# Patient Record
Sex: FEMALE | Race: WHITE | NOT HISPANIC OR LATINO
[De-identification: names, ages, dates, MRNs, and addresses within clinical notes are randomized per-mention and may not be internally consistent; named-entity substitution may affect disease eponyms.]

---

## 2017-02-15 ENCOUNTER — RX RENEWAL (OUTPATIENT)
Age: 67
End: 2017-02-15

## 2017-02-16 ENCOUNTER — RX RENEWAL (OUTPATIENT)
Age: 67
End: 2017-02-16

## 2017-03-22 ENCOUNTER — RX RENEWAL (OUTPATIENT)
Age: 67
End: 2017-03-22

## 2017-05-10 ENCOUNTER — NON-APPOINTMENT (OUTPATIENT)
Age: 67
End: 2017-05-10

## 2017-05-10 ENCOUNTER — APPOINTMENT (OUTPATIENT)
Dept: CARDIOLOGY | Facility: CLINIC | Age: 67
End: 2017-05-10

## 2017-05-10 VITALS
BODY MASS INDEX: 35.87 KG/M2 | SYSTOLIC BLOOD PRESSURE: 114 MMHG | WEIGHT: 190 LBS | DIASTOLIC BLOOD PRESSURE: 78 MMHG | HEART RATE: 84 BPM | HEIGHT: 61 IN | RESPIRATION RATE: 17 BRPM | OXYGEN SATURATION: 94 %

## 2017-06-14 ENCOUNTER — APPOINTMENT (OUTPATIENT)
Dept: FAMILY MEDICINE | Facility: CLINIC | Age: 67
End: 2017-06-14

## 2017-06-14 VITALS
HEART RATE: 93 BPM | SYSTOLIC BLOOD PRESSURE: 120 MMHG | OXYGEN SATURATION: 95 % | WEIGHT: 191.56 LBS | BODY MASS INDEX: 36.17 KG/M2 | HEIGHT: 61 IN | RESPIRATION RATE: 16 BRPM | DIASTOLIC BLOOD PRESSURE: 80 MMHG

## 2017-07-05 ENCOUNTER — APPOINTMENT (OUTPATIENT)
Dept: FAMILY MEDICINE | Facility: CLINIC | Age: 67
End: 2017-07-05

## 2017-07-05 VITALS
TEMPERATURE: 97.4 F | RESPIRATION RATE: 12 BRPM | DIASTOLIC BLOOD PRESSURE: 77 MMHG | HEIGHT: 61 IN | SYSTOLIC BLOOD PRESSURE: 114 MMHG | BODY MASS INDEX: 36.82 KG/M2 | HEART RATE: 94 BPM | WEIGHT: 195 LBS | OXYGEN SATURATION: 96 %

## 2017-09-13 ENCOUNTER — APPOINTMENT (OUTPATIENT)
Dept: CARDIOLOGY | Facility: CLINIC | Age: 67
End: 2017-09-13
Payer: MEDICARE

## 2017-09-13 ENCOUNTER — NON-APPOINTMENT (OUTPATIENT)
Age: 67
End: 2017-09-13

## 2017-09-13 VITALS
SYSTOLIC BLOOD PRESSURE: 122 MMHG | DIASTOLIC BLOOD PRESSURE: 78 MMHG | WEIGHT: 196 LBS | RESPIRATION RATE: 17 BRPM | HEART RATE: 76 BPM | BODY MASS INDEX: 37 KG/M2 | OXYGEN SATURATION: 93 % | HEIGHT: 61 IN

## 2017-09-13 PROCEDURE — 93000 ELECTROCARDIOGRAM COMPLETE: CPT

## 2017-09-13 PROCEDURE — 93306 TTE W/DOPPLER COMPLETE: CPT

## 2017-09-13 PROCEDURE — 99214 OFFICE O/P EST MOD 30 MIN: CPT

## 2017-09-14 ENCOUNTER — RX RENEWAL (OUTPATIENT)
Age: 67
End: 2017-09-14

## 2017-09-22 ENCOUNTER — MEDICATION RENEWAL (OUTPATIENT)
Age: 67
End: 2017-09-22

## 2017-10-29 ENCOUNTER — RX RENEWAL (OUTPATIENT)
Age: 67
End: 2017-10-29

## 2017-10-30 ENCOUNTER — APPOINTMENT (OUTPATIENT)
Dept: FAMILY MEDICINE | Facility: CLINIC | Age: 67
End: 2017-10-30
Payer: MEDICARE

## 2017-10-30 VITALS
DIASTOLIC BLOOD PRESSURE: 67 MMHG | SYSTOLIC BLOOD PRESSURE: 94 MMHG | WEIGHT: 198.25 LBS | BODY MASS INDEX: 37.43 KG/M2 | RESPIRATION RATE: 16 BRPM | OXYGEN SATURATION: 95 % | HEART RATE: 94 BPM | TEMPERATURE: 97.3 F | HEIGHT: 61 IN

## 2017-10-30 PROCEDURE — 90686 IIV4 VACC NO PRSV 0.5 ML IM: CPT

## 2017-10-30 PROCEDURE — G0008: CPT

## 2017-10-30 RX ORDER — PREDNISONE 20 MG/1
20 TABLET ORAL
Qty: 6 | Refills: 0 | Status: COMPLETED | COMMUNITY
Start: 2017-06-14 | End: 2017-10-30

## 2017-11-27 ENCOUNTER — RX RENEWAL (OUTPATIENT)
Age: 67
End: 2017-11-27

## 2017-12-14 ENCOUNTER — RX RENEWAL (OUTPATIENT)
Age: 67
End: 2017-12-14

## 2017-12-18 ENCOUNTER — RX RENEWAL (OUTPATIENT)
Age: 67
End: 2017-12-18

## 2018-01-03 ENCOUNTER — APPOINTMENT (OUTPATIENT)
Dept: FAMILY MEDICINE | Facility: CLINIC | Age: 68
End: 2018-01-03
Payer: MEDICARE

## 2018-01-03 VITALS
BODY MASS INDEX: 37.77 KG/M2 | WEIGHT: 200.06 LBS | RESPIRATION RATE: 16 BRPM | DIASTOLIC BLOOD PRESSURE: 84 MMHG | HEART RATE: 91 BPM | HEIGHT: 61 IN | OXYGEN SATURATION: 95 % | SYSTOLIC BLOOD PRESSURE: 100 MMHG

## 2018-01-03 DIAGNOSIS — L98.9 DISORDER OF THE SKIN AND SUBCUTANEOUS TISSUE, UNSPECIFIED: ICD-10-CM

## 2018-01-03 PROCEDURE — 99213 OFFICE O/P EST LOW 20 MIN: CPT

## 2018-01-08 ENCOUNTER — APPOINTMENT (OUTPATIENT)
Dept: CARDIOLOGY | Facility: CLINIC | Age: 68
End: 2018-01-08
Payer: MEDICARE

## 2018-01-08 PROCEDURE — A9500: CPT

## 2018-01-08 PROCEDURE — 93015 CV STRESS TEST SUPVJ I&R: CPT

## 2018-01-08 PROCEDURE — 78452 HT MUSCLE IMAGE SPECT MULT: CPT

## 2018-01-10 ENCOUNTER — APPOINTMENT (OUTPATIENT)
Dept: FAMILY MEDICINE | Facility: CLINIC | Age: 68
End: 2018-01-10
Payer: MEDICARE

## 2018-01-10 VITALS
HEART RATE: 84 BPM | HEIGHT: 61 IN | OXYGEN SATURATION: 97 % | WEIGHT: 200 LBS | RESPIRATION RATE: 16 BRPM | BODY MASS INDEX: 37.76 KG/M2

## 2018-01-10 PROCEDURE — 99214 OFFICE O/P EST MOD 30 MIN: CPT

## 2018-03-30 ENCOUNTER — APPOINTMENT (OUTPATIENT)
Dept: FAMILY MEDICINE | Facility: CLINIC | Age: 68
End: 2018-03-30
Payer: MEDICARE

## 2018-03-30 VITALS
SYSTOLIC BLOOD PRESSURE: 107 MMHG | RESPIRATION RATE: 16 BRPM | WEIGHT: 197 LBS | HEIGHT: 61 IN | OXYGEN SATURATION: 94 % | HEART RATE: 94 BPM | BODY MASS INDEX: 37.19 KG/M2 | DIASTOLIC BLOOD PRESSURE: 74 MMHG

## 2018-03-30 PROCEDURE — 99213 OFFICE O/P EST LOW 20 MIN: CPT

## 2018-03-30 RX ORDER — PREDNISONE 20 MG/1
20 TABLET ORAL
Refills: 0 | Status: DISCONTINUED | COMMUNITY
End: 2018-03-30

## 2018-03-30 RX ORDER — PREDNISONE 20 MG/1
20 TABLET ORAL DAILY
Qty: 5 | Refills: 0 | Status: DISCONTINUED | COMMUNITY
Start: 2017-07-05 | End: 2018-03-30

## 2018-05-18 ENCOUNTER — RX RENEWAL (OUTPATIENT)
Age: 68
End: 2018-05-18

## 2018-06-25 ENCOUNTER — EMERGENCY (EMERGENCY)
Facility: HOSPITAL | Age: 68
LOS: 1 days | Discharge: HOME | End: 2018-06-25
Attending: EMERGENCY MEDICINE | Admitting: EMERGENCY MEDICINE
Payer: MEDICARE

## 2018-06-25 VITALS
SYSTOLIC BLOOD PRESSURE: 111 MMHG | HEART RATE: 81 BPM | WEIGHT: 160.06 LBS | TEMPERATURE: 98 F | RESPIRATION RATE: 18 BRPM | OXYGEN SATURATION: 100 % | DIASTOLIC BLOOD PRESSURE: 71 MMHG

## 2018-06-25 DIAGNOSIS — S51.051A OPEN BITE, RIGHT ELBOW, INITIAL ENCOUNTER: ICD-10-CM

## 2018-06-25 PROCEDURE — 90715 TDAP VACCINE 7 YRS/> IM: CPT

## 2018-06-25 PROCEDURE — 90471 IMMUNIZATION ADMIN: CPT

## 2018-06-25 PROCEDURE — 99283 EMERGENCY DEPT VISIT LOW MDM: CPT | Mod: 25

## 2018-06-25 PROCEDURE — 99283 EMERGENCY DEPT VISIT LOW MDM: CPT

## 2018-06-25 PROCEDURE — 73080 X-RAY EXAM OF ELBOW: CPT

## 2018-06-25 PROCEDURE — 73080 X-RAY EXAM OF ELBOW: CPT | Mod: 26,RT

## 2018-06-25 RX ORDER — TETANUS TOXOID, REDUCED DIPHTHERIA TOXOID AND ACELLULAR PERTUSSIS VACCINE, ADSORBED 5; 2.5; 8; 8; 2.5 [IU]/.5ML; [IU]/.5ML; UG/.5ML; UG/.5ML; UG/.5ML
0.5 SUSPENSION INTRAMUSCULAR ONCE
Qty: 0 | Refills: 0 | Status: COMPLETED | OUTPATIENT
Start: 2018-06-25 | End: 2018-06-25

## 2018-06-25 RX ADMIN — TETANUS TOXOID, REDUCED DIPHTHERIA TOXOID AND ACELLULAR PERTUSSIS VACCINE, ADSORBED 0.5 MILLILITER(S): 5; 2.5; 8; 8; 2.5 SUSPENSION INTRAMUSCULAR at 18:45

## 2018-06-25 RX ADMIN — Medication 1 TABLET(S): at 18:57

## 2018-06-25 NOTE — ED PROVIDER NOTE - OBJECTIVE STATEMENT
67F h/o CAD on plavix p/w dog bite to right elbow. Last tdap unknown. Dog belongs to the neighbor, rabies utd. Noticed increased swelling around dog bite site. No other injuries.

## 2018-06-25 NOTE — ED ADULT NURSE NOTE - OBJECTIVE STATEMENT
pt presents to ED c/o dog bite to right arm. bruising noted to site. resp even and unlabored. denies n/v/d, fever, chills. no further complaints.

## 2018-06-27 ENCOUNTER — RX RENEWAL (OUTPATIENT)
Age: 68
End: 2018-06-27

## 2018-06-27 ENCOUNTER — APPOINTMENT (OUTPATIENT)
Dept: FAMILY MEDICINE | Facility: CLINIC | Age: 68
End: 2018-06-27
Payer: MEDICARE

## 2018-06-27 VITALS
HEART RATE: 89 BPM | WEIGHT: 196.06 LBS | BODY MASS INDEX: 37.02 KG/M2 | SYSTOLIC BLOOD PRESSURE: 100 MMHG | OXYGEN SATURATION: 95 % | TEMPERATURE: 99 F | HEIGHT: 61 IN | DIASTOLIC BLOOD PRESSURE: 60 MMHG | RESPIRATION RATE: 16 BRPM

## 2018-06-27 LAB — CYTOLOGY CVX/VAG DOC THIN PREP: NORMAL

## 2018-06-27 PROCEDURE — 99213 OFFICE O/P EST LOW 20 MIN: CPT

## 2018-06-27 NOTE — HISTORY OF PRESENT ILLNESS
[FreeTextEntry1] : dog bite [de-identified] : The patient suffered a double bite there by her neighbor's door 2 days ago and was told apparently has a yet history of having that many people it is not ill and reportedly his old charts she went to the emergency room that night where she underwent an x-ray and was prescribed Augmentin twice daily to prevent infection she was also given a tetanus booster. She feels well other than some localized soreness and pain and has had no fever or systemic symptoms told is reported as well

## 2018-06-27 NOTE — ASSESSMENT
[FreeTextEntry1] : The patient has a dog bite wound looks clean and uninfected at this time the dog is known to the patient and there is apparently healthy with a history of biting other people over the years. He received a technician and is on Augmentin from the emergency room it appeared that the rabies prophylaxis is not needed in this case she was then told to followup should she develop any fever chills or any worsening of her local symptoms. At the time of vt 20 minutes over was spent discussing dog bites and the infections and reassuring her that she seems to be doing well

## 2018-06-27 NOTE — REVIEW OF SYSTEMS
[Fever] : no fever [Chills] : no chills [Fatigue] : no fatigue [Night Sweats] : no night sweats [Negative] : Genitourinary [de-identified] : Ecchymoses  of the right elbow

## 2018-06-27 NOTE — PHYSICAL EXAM
[No Acute Distress] : no acute distress [Well Nourished] : well nourished [Well-Appearing] : well-appearing [Normal Sclera/Conjunctiva] : normal sclera/conjunctiva [PERRL] : pupils equal round and reactive to light [No JVD] : no jugular venous distention [Supple] : supple [No Respiratory Distress] : no respiratory distress  [Normal Rate] : normal rate  [Regular Rhythm] : with a regular rhythm [de-identified] : There is a fairly extensive ecchymoses about the right elbow with a small hematoma there are 2 puncture wounds there is no redness and no drainage noted. Patient has had no systemic symptoms is mildly tender

## 2018-06-29 ENCOUNTER — APPOINTMENT (OUTPATIENT)
Dept: FAMILY MEDICINE | Facility: CLINIC | Age: 68
End: 2018-06-29
Payer: MEDICARE

## 2018-07-06 ENCOUNTER — CHART COPY (OUTPATIENT)
Age: 68
End: 2018-07-06

## 2018-07-09 ENCOUNTER — APPOINTMENT (OUTPATIENT)
Dept: FAMILY MEDICINE | Facility: CLINIC | Age: 68
End: 2018-07-09

## 2018-07-09 ENCOUNTER — RX RENEWAL (OUTPATIENT)
Age: 68
End: 2018-07-09

## 2018-10-18 ENCOUNTER — RX RENEWAL (OUTPATIENT)
Age: 68
End: 2018-10-18

## 2018-10-18 PROBLEM — I25.10 ATHEROSCLEROTIC HEART DISEASE OF NATIVE CORONARY ARTERY WITHOUT ANGINA PECTORIS: Chronic | Status: ACTIVE | Noted: 2018-06-25

## 2018-10-29 ENCOUNTER — APPOINTMENT (OUTPATIENT)
Dept: FAMILY MEDICINE | Facility: CLINIC | Age: 68
End: 2018-10-29
Payer: MEDICARE

## 2018-10-29 VITALS
BODY MASS INDEX: 36.66 KG/M2 | HEART RATE: 80 BPM | OXYGEN SATURATION: 96 % | RESPIRATION RATE: 16 BRPM | HEIGHT: 61 IN | WEIGHT: 194.19 LBS

## 2018-10-29 VITALS — DIASTOLIC BLOOD PRESSURE: 70 MMHG | SYSTOLIC BLOOD PRESSURE: 100 MMHG

## 2018-10-29 DIAGNOSIS — Z23 ENCOUNTER FOR IMMUNIZATION: ICD-10-CM

## 2018-10-29 PROCEDURE — 99214 OFFICE O/P EST MOD 30 MIN: CPT

## 2018-10-29 RX ORDER — LAMOTRIGINE 250 MG/1
250 TABLET, EXTENDED RELEASE ORAL
Refills: 0 | Status: ACTIVE | COMMUNITY

## 2018-11-08 NOTE — HISTORY OF PRESENT ILLNESS
[FreeTextEntry1] : Presents for f/u appointment obesity, hyperlipidemia, COPD. Has been exercising at [employer]; 5 times per week, eats lunch there also. Feeling better. Patient happy, daughter got  recently, and is pregnant. Her son is back in [place]. No SI/HI reported. \par \par ROS: negative except as noted above\par

## 2018-11-08 NOTE — PHYSICAL EXAM
[No Acute Distress] : no acute distress [Well Nourished] : well nourished [Well Developed] : well developed [Well-Appearing] : well-appearing [Normal Voice/Communication] : normal voice/communication [Normal Sclera/Conjunctiva] : normal sclera/conjunctiva [No Respiratory Distress] : no respiratory distress  [Clear to Auscultation] : lungs were clear to auscultation bilaterally [No Accessory Muscle Use] : no accessory muscle use [Normal Rate] : normal rate  [Regular Rhythm] : with a regular rhythm [Normal S1, S2] : normal S1 and S2 [No Edema] : there was no peripheral edema

## 2018-12-12 ENCOUNTER — APPOINTMENT (OUTPATIENT)
Dept: CARDIOLOGY | Facility: CLINIC | Age: 68
End: 2018-12-12
Payer: MEDICARE

## 2018-12-12 ENCOUNTER — NON-APPOINTMENT (OUTPATIENT)
Age: 68
End: 2018-12-12

## 2018-12-12 VITALS
WEIGHT: 194 LBS | SYSTOLIC BLOOD PRESSURE: 107 MMHG | HEIGHT: 61 IN | DIASTOLIC BLOOD PRESSURE: 73 MMHG | OXYGEN SATURATION: 97 % | RESPIRATION RATE: 17 BRPM | HEART RATE: 90 BPM | BODY MASS INDEX: 36.63 KG/M2

## 2018-12-12 PROCEDURE — 93306 TTE W/DOPPLER COMPLETE: CPT

## 2018-12-12 PROCEDURE — 93000 ELECTROCARDIOGRAM COMPLETE: CPT

## 2018-12-12 PROCEDURE — 99215 OFFICE O/P EST HI 40 MIN: CPT

## 2018-12-12 NOTE — PHYSICAL EXAM
[General Appearance - Well Developed] : well developed [Normal Appearance] : normal appearance [Well Groomed] : well groomed [General Appearance - Well Nourished] : well nourished [No Deformities] : no deformities [General Appearance - In No Acute Distress] : no acute distress [Normal Conjunctiva] : the conjunctiva exhibited no abnormalities [Eyelids - No Xanthelasma] : the eyelids demonstrated no xanthelasmas [Normal Oral Mucosa] : normal oral mucosa [No Oral Pallor] : no oral pallor [No Oral Cyanosis] : no oral cyanosis [Normal Jugular Venous A Waves Present] : normal jugular venous A waves present [Normal Jugular Venous V Waves Present] : normal jugular venous V waves present [No Jugular Venous Fierro A Waves] : no jugular venous fierro A waves [Respiration, Rhythm And Depth] : normal respiratory rhythm and effort [Exaggerated Use Of Accessory Muscles For Inspiration] : no accessory muscle use [Auscultation Breath Sounds / Voice Sounds] : lungs were clear to auscultation bilaterally [Abdomen Soft] : soft [Abdomen Tenderness] : non-tender [Abdomen Mass (___ Cm)] : no abdominal mass palpated [Abnormal Walk] : normal gait [Gait - Sufficient For Exercise Testing] : the gait was sufficient for exercise testing [Nail Clubbing] : no clubbing of the fingernails [Cyanosis, Localized] : no localized cyanosis [Petechial Hemorrhages (___cm)] : no petechial hemorrhages [Skin Color & Pigmentation] : normal skin color and pigmentation [] : no rash [No Venous Stasis] : no venous stasis [Skin Lesions] : no skin lesions [No Skin Ulcers] : no skin ulcer [No Xanthoma] : no  xanthoma was observed [Oriented To Time, Place, And Person] : oriented to person, place, and time [Affect] : the affect was normal [Mood] : the mood was normal [No Anxiety] : not feeling anxious [Normal Rate] : normal [Normal S1] : normal S1 [Normal S2] : normal S2 [No Murmur] : no murmurs heard [2+] : left 2+ [No Abnormalities] : the abdominal aorta was not enlarged and no bruit was heard [No Pitting Edema] : no pitting edema present [S3] : no S3 [S4] : no S4 [Right Carotid Bruit] : no bruit heard over the right carotid [Left Carotid Bruit] : no bruit heard over the left carotid [Right Femoral Bruit] : no bruit heard over the right femoral artery [Left Femoral Bruit] : no bruit heard over the left femoral artery

## 2018-12-12 NOTE — REASON FOR VISIT
[Follow-Up - Clinic] : a clinic follow-up of [FreeTextEntry2] : h/o [facility] 9/16, MI,cad,stents RCA [FreeTextEntry1] : no anginal sscp or sob since

## 2018-12-12 NOTE — DISCUSSION/SUMMARY
[Improving] : improving [Hyperlipidemia] : hyperlipidemia [<70] : goal is <70 [Patient] : the patient [Hypertension] : hypertension [Responding to Treatment] : responding to treatment [None] : none [Stable] : stable [Echocardiogram] : an echocardiogram [Smoking Cessation] : smoking cessation [de-identified] : s/p stent rca, neg nuke stress 1/18

## 2018-12-28 ENCOUNTER — RX RENEWAL (OUTPATIENT)
Age: 68
End: 2018-12-28

## 2018-12-31 ENCOUNTER — RX CHANGE (OUTPATIENT)
Age: 68
End: 2018-12-31

## 2019-01-07 NOTE — ED ADULT NURSE NOTE - EENT WDL
Hand/Wrist HPI





- HPI Summary


HPI Summary: 





38 y/o female presents to the urgent care c/o left 3rd and 4th fingers w/ 

bruise in her nails s/p injury after slamming the door at home around 1345pm 

today. Pt report pain is 8/10 and it is throbbing specially in the ring finger. 

Pt also states index finger was squeezed too, but it is not as painful. Pt 

states mild decrease ROM and mild numbness and tingling sensation over the 

nails. Pt denies previous injury, denies fever, SOB, chest pain. abdominal pain.

, N/V/D. Pt removed her ring since her finger was mildly swollen. 








- History Of Current Complaint


Chief Complaint: UCUpperExtremity


Stated Complaint: LT HAND COMPLAINT


Time Seen by Provider: 01/07/19 15:49


Hx Obtained From: Patient


Hx Last Menstrual Period: 12/26/18


Pregnant?: No


Onset/Duration: Sudden Onset, Lasting Hours - 3hrs


Severity Initially: Moderate


Severity Currently: Moderate


Pain Intensity: 6


Pain Scale Used: 0-10 Numeric


Character Of Pain: Throbbing


Aggravating Factor(s): Movement


Alleviating Factor(s): Rest


Associated Signs And Symptoms: Positive: Swelling, Bruising - over the left 3rd 

and 4th nail bruising, Numbness/Tingling - mild





- Allergies/Home Medications


Allergies/Adverse Reactions: 


 Allergies











Allergy/AdvReac Type Severity Reaction Status Date / Time


 


prednisone Allergy  Swelling Verified 01/07/19 15:35





   Of  





   Face,Lips,&  





   Throat  


 


ranitidine Allergy  Tachycardia Verified 01/07/19 15:35


 


Sulfa (Sulfonamide Allergy  Itching Verified 01/07/19 15:35





Antibiotics)     


 


Tetracyclines Allergy  Itching Verified 01/07/19 15:35


 


seasonal Allergy  Runny Nose Uncoded 01/07/19 15:35














PMH/Surg Hx/FS Hx/Imm Hx


Previously Healthy: Yes


Respiratory History: Asthma





- Surgical History


Surgical History: Yes


Surgery Procedure, Year, and Place: Cholecyst[facility], [facility], [facility]; Tubal 

L[facility], [facility], [facility]; right hand x 2, including ulnar nerve entrapment 2000.  

tonsilectomy'87, GALLBLADDER REMOVED





- Family History


Known Family History: Positive: Cardiac Disease, Hypertension, Other - 

"Everything"


   Negative: Diabetes





- Social History


Occupation: Employed Full-time


Lives: With Family


Alcohol Use: None


Substance Use Type: None


Smoking Status (MU): Never Smoked Tobacco





- Immunization History


Most Recent Influenza Vaccination: 03/02/17


Most Recent Tetanus Shot: unknown


Most Recent Pneumonia Vaccination: approx 2010





Review of Systems


All Other Systems Reviewed And Are Negative: Yes


Constitutional: Positive: Negative


Skin: Positive: Bruising - over the left 3rd and 4th finger nails w/ swelling s/

p injury


Eyes: Positive: Negative


ENT: Positive: Negative


Respiratory: Positive: Negative


Cardiovascular: Positive: Negative


Gastrointestinal: Positive: Negative


Genitourinary: Positive: Negative


Motor: Positive: Negative


Neurovascular: Positive: Negative


Musculoskeletal: Positive: Decreased ROM - left 3rd and 4th phalanx s/p injury, 

Other: - left 3rd and 4th phalanx pain s/p injury


Neurological: Positive: Numbness - over the 4th finger nail


Psychological: Positive: Negative


Is Patient Immunocompromised?: No





Physical Exam





- Summary


Physical Exam Summary: 





Vital Signs Reviewed: Yes


General: well developed, well nourished female sitting in the examining table w/

o any apparent distress


Eye Exam: Normal


Eyes: Positive: Conjunctiva Clear - PERRLA, EOMI, fundi grossly normal


ENT: Positive: Normal ENT inspection, Hearing grossly normal, Pharynx normal, 

TMs normal


Neck: Positive: Supple, Nontender, No Lymphadenopathy


Respiratory: Positive: Chest non-tender, Lungs clear, Normal breath sounds, No 

respiratory distress


Cardiovascular: Positive: RRR, No Murmur, Pulses Normal, Brisk Capillary Refill


Abdomen Description: Positive: Nontender, No Organomegaly, Soft.  Negative: CVA 

Tenderness (R), CVA Tenderness (L)


Bowel Sounds: Positive: Present


Musculoskeletal: Positive: left Hand/Fingers: the L hand is without obvious 

asymmetry or deformity when compared to the R hand.  mild swelling around 

dorsal side of #3rd and 4th distal  phalanges, w/ subungal hematomas over nails

, no erythema, atrophy, no obvious deformity. No open wounds,bony deformity.  

Normal cascade of fingers.  Decrease ROM ot the left 3rd and 4th DIPJ  due to 

pain.  No focal fullness, but, mild throbbing pain, swelling of finger tip.    

Pulses and capillary refill WNL, positive reflexes and sensation intact


Neurological: Positive: Alert, Muscle Tone Normal


Psychological Exam: Normal


Skin: Positive: Positive subungal hematoma over the LF 3rd and 4th nails, 4th>

3rd.  sensation is intact, capillary refill WNL, reflexes WNL








Triage Information Reviewed: Yes


Vital Signs: 


 Initial Vital Signs











Temp  97.6 F   01/07/19 15:36


 


Pulse  105   01/07/19 15:36


 


Resp  16   01/07/19 15:36


 


BP  140/70   01/07/19 15:36


 


Pulse Ox  100   01/07/19 15:36














Hand/Wrist Course/Dx





- Course


Course Of Treatment: 38 y/o female presents to the urgent care c/o left 3rd and 

4th fingers w/ bruise in her nails s/p injury after slamming the door at home 

around 1345pm today. Pt report pain is 8/10 and it is throbbing specially in 

the ring finger. Pt also states index finger was squeezed too, but it is not as 

painful. Pt states mild decrease ROM and mild numbness and tingling sensation 

over the nails. Pt denies previous injury, denies fever, SOB, chest pain. 

abdominal pain., N/V/D. Pt removed her ring since her finger was mildly 

swollen.Hx obtained. Pt w/ subungal hematoma on left 4th phalanx nail that 

needs trephination on examination.  Left hand   X-ray ordered: impression: 

There was no fracture, dislocation, soft tissue swelling or FB noted as per 

radiologist. Probably a left 3rd and 4th finger  sprain. Digital Nerve block 

procedure and trephination of subungual hematoma procedure: The procedure was 

explained and consent obtained. Universal protocol performed.   Digital nerve 

block procedure performed with  2mL of Lido 1% at the base of the 4th left 

phalanx with good anesthesia obtained.   Sterile drape and prep were done. a 

discrete hole performed over the subungual hematoma to release blood w/ a 

cauterizer. Small amount of blood release. topical Bacitracin applied over the 

area. Wound covered with sterile dressing.  The patient tolerated the procedure 

well and neurovascular intact. Pt Rx Bacitracin oint and Ibuprofen PO to 

alleviate symptoms. Pt's 4th finger immobilized with a finger splint and body 

tape with the #3rd finger. Pt advised RICE and take Ibuprofen PO for pain. F/u 

with Orthopedic Dr Chawla  if not improvement of symptoms in 1 week. Pt 

understood and agreed with D/C instructions.





- Differential Dx/Diagnosis


Differential Diagnosis/HQI/PQRI: Abrasion, Contusion, Fracture, Sprain, Strain, 

Subungual Hematoma, Tendonitis


Provider Diagnosis: 


 Subungual hematoma of finger of left hand, Finger sprain, Elevated BP without 

diagnosis of hypertension








Discharge





- Sign-Out/Discharge


Documenting (check all that apply): Patient Departure - D/C home


All imaging exams completed and their final reports reviewed: Yes





- Discharge Plan


Condition: Stable


Disposition: HOME


Prescriptions: 


Bacitracin OINTMENT* 1 applic TOPICAL BID #1 tube


Ibuprofen TAB* [Motrin TAB* 600 MG] 600 mg PO Q6H PRN #30 tab


 PRN Reason: Pain


Patient Education Materials:  Subungual Hematoma (ED), Finger Sprain (ED), Low-

Sodium Diet (ED)


Referrals: 


[facility] [Outside] - 1 Week


Anival Castro MD [Medical Doctor] - 1 Week


Additional Instructions: 


1-Please take medications as directed to alleviate pain and swelling.


2-Please apply ice, keep your index finger  immobilized with the splint. Avoid 

heavy lifting. apply Bacitracin oint as directed over the nail. 


3- Please f/u with Orthopedic Dr Chawla or your PCP  in 1 week is not 

improvement of symptoms for further evaluation and treatment.


4- Your BP is elevated today. please decrease salt in your diet, monitor BP and 

if it continues to be elevated please f/u with your PCP for further management














- Billing Disposition and Condition


Condition: STABLE


Disposition: Home Eyes with no visual disturbances.  Ears clean and dry and no hearing difficulties. Nose with pink mucosa and no drainage.  Mouth mucous membranes moist and pink.  No tenderness or swelling to throat or neck.

## 2019-04-03 ENCOUNTER — RX RENEWAL (OUTPATIENT)
Age: 69
End: 2019-04-03

## 2019-04-09 DIAGNOSIS — Z71.6 TOBACCO ABUSE COUNSELING: ICD-10-CM

## 2019-06-12 ENCOUNTER — APPOINTMENT (OUTPATIENT)
Dept: CARDIOLOGY | Facility: CLINIC | Age: 69
End: 2019-06-12

## 2019-08-16 ENCOUNTER — RX RENEWAL (OUTPATIENT)
Age: 69
End: 2019-08-16

## 2019-08-19 ENCOUNTER — APPOINTMENT (OUTPATIENT)
Dept: FAMILY MEDICINE | Facility: CLINIC | Age: 69
End: 2019-08-19
Payer: MEDICARE

## 2019-08-19 VITALS — DIASTOLIC BLOOD PRESSURE: 80 MMHG | BODY MASS INDEX: 39.75 KG/M2 | SYSTOLIC BLOOD PRESSURE: 120 MMHG | WEIGHT: 210.4 LBS

## 2019-08-19 DIAGNOSIS — F17.210 NICOTINE DEPENDENCE, CIGARETTES, UNCOMPLICATED: ICD-10-CM

## 2019-08-19 PROCEDURE — 99214 OFFICE O/P EST MOD 30 MIN: CPT

## 2019-08-20 PROBLEM — F17.210 CIGARETTE SMOKER: Status: RESOLVED | Noted: 2019-02-26 | Resolved: 2019-08-20

## 2019-08-20 NOTE — ASSESSMENT
[FreeTextEntry1] : change diet/increase activity\par continue to abstain from smoking\par RTO 3 months for f/u

## 2019-08-20 NOTE — HISTORY OF PRESENT ILLNESS
[FreeTextEntry1] : Patient presents for f/u HTN. Quit smoking!!! Has gained some weight since then. Trying to diet and exercise. Feels generally well. Needs refills. Has been following with cardiology, saw Dr. Garcia. No reported HAs, blurry vision or epistaxis. \par

## 2019-08-20 NOTE — PHYSICAL EXAM
[No Acute Distress] : no acute distress [Well Developed] : well developed [Well Nourished] : well nourished [Normal Voice/Communication] : normal voice/communication [Well-Appearing] : well-appearing [Normal Sclera/Conjunctiva] : normal sclera/conjunctiva [Normal Outer Ear/Nose] : the outer ears and nose were normal in appearance [Normal Oropharynx] : the oropharynx was normal [No Respiratory Distress] : no respiratory distress  [No Accessory Muscle Use] : no accessory muscle use [Clear to Auscultation] : lungs were clear to auscultation bilaterally [Regular Rhythm] : with a regular rhythm [Normal Rate] : normal rate  [Normal S1, S2] : normal S1 and S2 [Speech Grossly Normal] : speech grossly normal [Memory Grossly Normal] : memory grossly normal [Normal Affect] : the affect was normal [Alert and Oriented x3] : oriented to person, place, and time [Normal Insight/Judgement] : insight and judgment were intact

## 2019-09-04 ENCOUNTER — APPOINTMENT (OUTPATIENT)
Dept: FAMILY MEDICINE | Facility: CLINIC | Age: 69
End: 2019-09-04
Payer: MEDICARE

## 2019-09-04 VITALS
OXYGEN SATURATION: 96 % | WEIGHT: 211 LBS | RESPIRATION RATE: 16 BRPM | BODY MASS INDEX: 39.84 KG/M2 | DIASTOLIC BLOOD PRESSURE: 60 MMHG | HEART RATE: 88 BPM | HEIGHT: 61 IN | SYSTOLIC BLOOD PRESSURE: 100 MMHG

## 2019-09-04 DIAGNOSIS — M54.32 SCIATICA, LEFT SIDE: ICD-10-CM

## 2019-09-04 PROCEDURE — 99214 OFFICE O/P EST MOD 30 MIN: CPT

## 2019-09-08 ENCOUNTER — RX RENEWAL (OUTPATIENT)
Age: 69
End: 2019-09-08

## 2019-09-10 PROBLEM — M54.32 SCIATICA OF LEFT SIDE: Status: ACTIVE | Noted: 2017-06-14

## 2019-09-10 NOTE — PHYSICAL EXAM
[No Acute Distress] : no acute distress [Well Nourished] : well nourished [Well Developed] : well developed [Well-Appearing] : well-appearing [Normal Voice/Communication] : normal voice/communication [Normal Sclera/Conjunctiva] : normal sclera/conjunctiva [Normal Outer Ear/Nose] : the outer ears and nose were normal in appearance [No JVD] : no jugular venous distention [de-identified] : +straight leg raise

## 2019-09-10 NOTE — ASSESSMENT
[FreeTextEntry1] : prednisone -RBA discussed with patient\par mammogram due\par RTO for CPE, complete blood work 1 week prior to CPE\par

## 2019-09-10 NOTE — HISTORY OF PRESENT ILLNESS
[FreeTextEntry8] : Patient presents c/o 'sciatica' of left side acting up again. Knows she will need a new hip in the near future. No loss of bowel or bladder control, no urinary retention. Quit smoking, wants to work on losing weight. \par \par ROS: negative except as noted above\par

## 2019-11-19 ENCOUNTER — RX RENEWAL (OUTPATIENT)
Age: 69
End: 2019-11-19

## 2020-01-03 ENCOUNTER — RX RENEWAL (OUTPATIENT)
Age: 70
End: 2020-01-03

## 2020-01-28 ENCOUNTER — RX RENEWAL (OUTPATIENT)
Age: 70
End: 2020-01-28

## 2020-02-05 ENCOUNTER — APPOINTMENT (OUTPATIENT)
Dept: FAMILY MEDICINE | Facility: CLINIC | Age: 70
End: 2020-02-05
Payer: MEDICARE

## 2020-02-05 VITALS
HEART RATE: 87 BPM | HEIGHT: 62 IN | OXYGEN SATURATION: 95 % | DIASTOLIC BLOOD PRESSURE: 70 MMHG | RESPIRATION RATE: 14 BRPM | SYSTOLIC BLOOD PRESSURE: 110 MMHG | WEIGHT: 209.13 LBS | BODY MASS INDEX: 38.48 KG/M2 | TEMPERATURE: 97.4 F

## 2020-02-05 DIAGNOSIS — Z01.818 ENCOUNTER FOR OTHER PREPROCEDURAL EXAMINATION: ICD-10-CM

## 2020-02-05 PROCEDURE — 90732 PPSV23 VACC 2 YRS+ SUBQ/IM: CPT

## 2020-02-05 PROCEDURE — G0009: CPT

## 2020-02-05 PROCEDURE — 90472 IMMUNIZATION ADMIN EACH ADD: CPT

## 2020-02-05 PROCEDURE — 90686 IIV4 VACC NO PRSV 0.5 ML IM: CPT

## 2020-02-05 PROCEDURE — 99214 OFFICE O/P EST MOD 30 MIN: CPT | Mod: 25

## 2020-02-06 ENCOUNTER — FORM ENCOUNTER (OUTPATIENT)
Age: 70
End: 2020-02-06

## 2020-02-07 ENCOUNTER — APPOINTMENT (OUTPATIENT)
Dept: MAMMOGRAPHY | Facility: HOSPITAL | Age: 70
End: 2020-02-07
Payer: MEDICARE

## 2020-02-07 ENCOUNTER — OUTPATIENT (OUTPATIENT)
Dept: OUTPATIENT SERVICES | Facility: HOSPITAL | Age: 70
LOS: 1 days | End: 2020-02-07
Payer: MEDICARE

## 2020-02-07 DIAGNOSIS — Z12.31 ENCOUNTER FOR SCREENING MAMMOGRAM FOR MALIGNANT NEOPLASM OF BREAST: ICD-10-CM

## 2020-02-07 PROCEDURE — 77067 SCR MAMMO BI INCL CAD: CPT | Mod: 26

## 2020-02-07 PROCEDURE — 77063 BREAST TOMOSYNTHESIS BI: CPT | Mod: 26

## 2020-02-07 PROCEDURE — 77067 SCR MAMMO BI INCL CAD: CPT

## 2020-02-07 PROCEDURE — 77063 BREAST TOMOSYNTHESIS BI: CPT

## 2020-02-11 ENCOUNTER — LABORATORY RESULT (OUTPATIENT)
Age: 70
End: 2020-02-11

## 2020-02-11 PROBLEM — Z01.818 PREOPERATIVE CLEARANCE: Status: ACTIVE | Noted: 2020-02-05

## 2020-02-11 NOTE — PHYSICAL EXAM
[No Acute Distress] : no acute distress [Well Nourished] : well nourished [Well Developed] : well developed [Well-Appearing] : well-appearing [Normal Voice/Communication] : normal voice/communication [Normal Sclera/Conjunctiva] : normal sclera/conjunctiva [Normal Outer Ear/Nose] : the outer ears and nose were normal in appearance [Normal Oropharynx] : the oropharynx was normal [No Respiratory Distress] : no respiratory distress  [No Accessory Muscle Use] : no accessory muscle use [Clear to Auscultation] : lungs were clear to auscultation bilaterally [Normal Rate] : normal rate  [Regular Rhythm] : with a regular rhythm [Normal S1, S2] : normal S1 and S2 [Normal Supraclavicular Nodes] : no supraclavicular lymphadenopathy [Normal Anterior Cervical Nodes] : no anterior cervical lymphadenopathy [Speech Grossly Normal] : speech grossly normal [Memory Grossly Normal] : memory grossly normal [Normal Affect] : the affect was normal [Alert and Oriented x3] : oriented to person, place, and time [Normal Insight/Judgement] : insight and judgment were intact

## 2020-02-11 NOTE — HISTORY OF PRESENT ILLNESS
[FreeTextEntry1] : Patient presents with daughter c/o worsened hip pain x past 1 week. Patient is ready to do hip replacement. Daughter concerned that everything will be in place for her mom to be prepared for surgery. Will send to cardiology and pulmonology. +anxiety about getting everything done prior to pre - op appointment, and anxiety about surgery as well. \par \par ROS: negative except as noted above\par

## 2020-02-11 NOTE — ASSESSMENT
[FreeTextEntry1] : patient has cardiology appointment already\par referral to pulmonology\par RTO for pre-op after PSTs

## 2020-02-24 ENCOUNTER — NON-APPOINTMENT (OUTPATIENT)
Age: 70
End: 2020-02-24

## 2020-02-24 ENCOUNTER — APPOINTMENT (OUTPATIENT)
Dept: CARDIOLOGY | Facility: CLINIC | Age: 70
End: 2020-02-24
Payer: MEDICARE

## 2020-02-24 VITALS
RESPIRATION RATE: 16 BRPM | SYSTOLIC BLOOD PRESSURE: 102 MMHG | BODY MASS INDEX: 39.56 KG/M2 | HEIGHT: 62 IN | WEIGHT: 215 LBS | OXYGEN SATURATION: 93 % | DIASTOLIC BLOOD PRESSURE: 63 MMHG | HEART RATE: 99 BPM

## 2020-02-24 PROCEDURE — 93000 ELECTROCARDIOGRAM COMPLETE: CPT

## 2020-02-24 PROCEDURE — 99215 OFFICE O/P EST HI 40 MIN: CPT

## 2020-02-24 NOTE — PHYSICAL EXAM
[General Appearance - Well Developed] : well developed [Normal Appearance] : normal appearance [Well Groomed] : well groomed [General Appearance - Well Nourished] : well nourished [No Deformities] : no deformities [Normal Conjunctiva] : the conjunctiva exhibited no abnormalities [General Appearance - In No Acute Distress] : no acute distress [Eyelids - No Xanthelasma] : the eyelids demonstrated no xanthelasmas [Normal Oral Mucosa] : normal oral mucosa [No Oral Pallor] : no oral pallor [Normal Jugular Venous A Waves Present] : normal jugular venous A waves present [No Oral Cyanosis] : no oral cyanosis [Normal Jugular Venous V Waves Present] : normal jugular venous V waves present [No Jugular Venous Fierro A Waves] : no jugular venous fierro A waves [Respiration, Rhythm And Depth] : normal respiratory rhythm and effort [Exaggerated Use Of Accessory Muscles For Inspiration] : no accessory muscle use [Auscultation Breath Sounds / Voice Sounds] : lungs were clear to auscultation bilaterally [Abdomen Soft] : soft [Abdomen Tenderness] : non-tender [Abdomen Mass (___ Cm)] : no abdominal mass palpated [Nail Clubbing] : no clubbing of the fingernails [Petechial Hemorrhages (___cm)] : no petechial hemorrhages [Cyanosis, Localized] : no localized cyanosis [Skin Color & Pigmentation] : normal skin color and pigmentation [No Venous Stasis] : no venous stasis [] : no rash [No Skin Ulcers] : no skin ulcer [No Xanthoma] : no  xanthoma was observed [Skin Lesions] : no skin lesions [Affect] : the affect was normal [Mood] : the mood was normal [Oriented To Time, Place, And Person] : oriented to person, place, and time [Normal Rate] : normal [No Anxiety] : not feeling anxious [Normal S2] : normal S2 [Normal S1] : normal S1 [No Murmur] : no murmurs heard [No Abnormalities] : the abdominal aorta was not enlarged and no bruit was heard [2+] : left 2+ [No Pitting Edema] : no pitting edema present [FreeTextEntry1] : pt cant walk on treadmill, needs hip surgery [S4] : no S4 [S3] : no S3 [Right Carotid Bruit] : no bruit heard over the right carotid [Left Carotid Bruit] : no bruit heard over the left carotid [Right Femoral Bruit] : no bruit heard over the right femoral artery [Left Femoral Bruit] : no bruit heard over the left femoral artery

## 2020-02-24 NOTE — HISTORY OF PRESENT ILLNESS
[Preoperative Visit] : for a medical evaluation prior to surgery [Scheduled Procedure ___] : a [unfilled] [Surgeon Name ___] : surgeon: [unfilled] [Urinary Frequency] : urinary frequency [Poor Exercise Tolerance] : poor exercise tolerance [Cardiovascular Disease] : cardiovascular disease [Nicotine Dependence] : nicotine dependence [Frequent use of NSAIDs] : frequent use of NSAIDs [Steroid Use in Last 6 Months] : steroid use in the last six months [Frequent Aspirin Use] : frequent aspirin use [Prior Anesthesia] : Prior anesthesia [Electrocardiogram] : ~T an ECG ~C was performed [Cardiac Catheterization  (Diagnostic)] : cardiac catheterization ~T ~C was performed [Cardiovascular Stress Test] : a cardiac stress test ~T ~C was performed [Chills] : no chills [Fever] : no fever [Fatigue] : no fatigue [Cough] : no cough [Chest Pain] : no chest pain [Dyspnea] : no dyspnea [Dysuria] : no dysuria [Vomiting] : no vomiting [Nausea] : no nausea [Easy Bruising] : no easy bruising [Diarrhea] : no diarrhea [Abdominal Pain] : no abdominal pain [Lower Extremity Swelling] : no lower extremity swelling [Diabetes] : no diabetes [Pulmonary Disease] : no pulmonary disease [Alcohol Use] : no  alcohol use [Anti-Platelet Agents] : no anti-platelet agents [Renal Disease] : no renal disease [GI Disease] : no gastrointestinal disease [Sleep Apnea] : no sleep apnea [Thromboembolic Problems] : no thromboembolic problems [Impaired Immunity] : no impaired immunity [Transfusion Reaction] : no transfusion reaction [Anesthesia Reaction] : no anesthesia reaction [Sudden Death] : no sudden death [Prev Anesthesia Reaction] : no previous anesthesia reaction [Clotting Disorder] : no clotting disorder [Bleeding Disorder] : no bleeding disorder

## 2020-02-24 NOTE — DISCUSSION/SUMMARY
[Patient Intermediate Risk] : the patient is an intermediate risk [Procedure Intermediate Risk] : the procedure risk is intermediate [Additional Diagnostics Recommended] : additional diagnostics recommended [As per surgery] : as per surgery [Continue] : Continue medications as currently directed [FreeTextEntry1] : echo, pharmacological stress test, cardiac monitor intra-op

## 2020-02-26 ENCOUNTER — APPOINTMENT (OUTPATIENT)
Dept: PULMONOLOGY | Facility: CLINIC | Age: 70
End: 2020-02-26
Payer: MEDICARE

## 2020-02-26 VITALS
RESPIRATION RATE: 16 BRPM | TEMPERATURE: 98.1 F | BODY MASS INDEX: 40.59 KG/M2 | HEIGHT: 61 IN | SYSTOLIC BLOOD PRESSURE: 108 MMHG | HEART RATE: 98 BPM | DIASTOLIC BLOOD PRESSURE: 68 MMHG | WEIGHT: 215 LBS | OXYGEN SATURATION: 97 %

## 2020-02-26 DIAGNOSIS — R94.31 ABNORMAL ELECTROCARDIOGRAM [ECG] [EKG]: ICD-10-CM

## 2020-02-26 PROCEDURE — G0296 VISIT TO DETERM LDCT ELIG: CPT

## 2020-02-26 PROCEDURE — 99205 OFFICE O/P NEW HI 60 MIN: CPT

## 2020-02-26 PROCEDURE — 99406 BEHAV CHNG SMOKING 3-10 MIN: CPT

## 2020-02-26 NOTE — REVIEW OF SYSTEMS
[TextBox_94] : hip pain being evaluated for hip surgery [Negative] : Endocrine [TextBox_44] : hypertension

## 2020-02-26 NOTE — PHYSICAL EXAM
[No Acute Distress] : no acute distress [Normal Oropharynx] : normal oropharynx [No Neck Mass] : no neck mass [Normal Appearance] : normal appearance [Normal S1, S2] : normal s1, s2 [Normal Rate/Rhythm] : normal rate/rhythm [No Resp Distress] : no resp distress [No Murmurs] : no murmurs [Clear to Auscultation Bilaterally] : clear to auscultation bilaterally [Benign] : benign [No Abnormalities] : no abnormalities [Normal Gait] : normal gait [No Cyanosis] : no cyanosis [No Clubbing] : no clubbing [No Edema] : no edema [Normal Color/ Pigmentation] : normal color/ pigmentation [FROM] : FROM [No Focal Deficits] : no focal deficits [Oriented x3] : oriented x3 [Normal Affect] : normal affect

## 2020-02-26 NOTE — COUNSELING
[Risk of tobacco use and health benefits of smoking cessation discussed] : Risk of tobacco use and health benefits of smoking cessation discussed [Cessation strategies including cessation program discussed] : Cessation strategies including cessation program discussed [Use of nicotine replacement therapies and other medications discussed] : Use of nicotine replacement therapies and other medications discussed [Encouraged to pick a quit date and identify support needed to quit] : Encouraged to pick a quit date and identify support needed to quit [Willing to Quit Smoking] : Willing to quit smoking [Tobacco Use Cessation Intermediate Greater Than 3 Minutes Up to 10 Minutes] : Tobacco Use Cessation Intermediate Greater Than 3 Minutes Up to 10 Minutes [ - Annual Lung Cancer Screening/Share Decision Making Discussion] : Annual Lung Cancer Screening/Share Decision Making Discussion. (I have advised this patient to have a Low Dose CT (LDCT) scan of the lungs and have discussed the following with the patient in a shared decision making discussion:   Benefits of Detection and Early Treatment: There is adequate evidence that annual screening for lung cancer with LDCT in a population of high-risk persons can prevent a substantial number of lung cancer–related deaths. The magnitude of benefit depends on the individual patient's risk for lung cancer, as those who are at highest risk are most likely to benefit. Screening cannot prevent most lung cancer–related deaths, and does not replace smoking cessation. Harms of Detection and Early Intervention and Treatment: The harms associated with LDCT screening include false-negative and false-positive results, incidental findings, over diagnosis, and radiation exposure. False-positive LDCT results occur in a substantial proportion of screened persons; 95% of all positive results do not lead to a diagnosis of cancer. In a high-quality screening program, further imaging can resolve most false-positive results; however, some patients may require invasive procedures. Radiation harms, including cancer resulting from cumulative exposure to radiation, vary depending on the age at the start of screening; the number of scans received; and the person's exposure to other sources of radiation, particularly other medical imaging.)

## 2020-02-27 ENCOUNTER — APPOINTMENT (OUTPATIENT)
Dept: ORTHOPEDIC SURGERY | Facility: CLINIC | Age: 70
End: 2020-02-27
Payer: MEDICARE

## 2020-02-27 DIAGNOSIS — M19.90 UNSPECIFIED OSTEOARTHRITIS, UNSPECIFIED SITE: ICD-10-CM

## 2020-02-27 DIAGNOSIS — Z92.29 PERSONAL HISTORY OF OTHER DRUG THERAPY: ICD-10-CM

## 2020-02-27 DIAGNOSIS — S51.059A: ICD-10-CM
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
## 2020-02-27 PROCEDURE — 99203 OFFICE O/P NEW LOW 30 MIN: CPT

## 2020-02-27 PROCEDURE — 73502 X-RAY EXAM HIP UNI 2-3 VIEWS: CPT | Mod: LT

## 2020-02-27 NOTE — PHYSICAL EXAM
[Normal RUE] : Right Upper Extremity: No scars, rashes, lesions, ulcers, skin intact [Normal LUE] : Left Upper Extremity: No scars, rashes, lesions, ulcers, skin intact [Normal Touch] : sensation intact for touch [Normal LLE] : Left Lower Extremity: No scars, rashes, lesions, ulcers, skin intact [Normal RLE] : Right Lower Extremity: No scars, rashes, lesions, ulcers, skin intact [Normal] : Alert and in no acute distress [de-identified] : Left Lower Extremity\par o Hip :\par ¦ Inspection/Palpation : no tenderness, no swelling, no deformity\par ¦ Range of Motion : very limited rotational movement with pain and crepitus\par ¦ Stability : joint stability intact\par ¦ Strength : weakness to hip flexion \par ¦ Tests and Signs : all tests for stability normal\par o Muscle Tone : tone normal\par o Muscle Bulk : normal muscle bulk present\par o Skin : no erythema, no ecchymosis\par o Sensation : sensation to light touch intact\par o Vascular Exam : no edema, no cyanosis, dorsalis pedis artery pulse 2+, posterior tibial artery pulse 2+ [de-identified] : o Right, Left Hip and pelvis : AP and lateral views were obtained, there is significant joint space narrowing of the left hip, bone on bone with acetabular osteophytes and sclerosis. right hip joint space maintained, no soft tissue abnormalities, no fractures, alignment is normal, normal bone density,  no bony lesions.

## 2020-02-27 NOTE — END OF VISIT
[FreeTextEntry3] : All medical record entries made by the Davinaibjey were at my, Dr. Rusty Mariano, direction and personally dictated by me on 02/27/2020. I have reviewed the chart and agree that the record accurately reflects my personal performance of the history, physical exam, assessment and plan. I have also personally directed, reviewed, and agreed with the chart.

## 2020-02-27 NOTE — DISCUSSION/SUMMARY
Patient recently diagnosed with borderline personality disorder, presents with suicidal thoughts. Patient had plan to jump off her fiances roof last week after having a disagreement with him [de-identified] : The underlying pathophysiology was reviewed in great detail with the patient as well as the various treatment options, including analgesics, NSAIDS, Physical therapy, steroid injections, and total hip arthroplasty.\par \par She has been referred to a hip arthroplasty specialist for further treatment and surgical consult. The risks, benefits, convalescence and alternatives were reviewed. Numerous questions were asked and answered. This was explained in the presence of their daughter.\par \par Patient may follow up as needed.

## 2020-02-27 NOTE — HISTORY OF PRESENT ILLNESS
[de-identified] : 69 year old female presents for initial evaluation of left hip pain. Patient states her hip pain has been worsening over the past year. Most pain is located in the groin and is increased with any motion of the hip. She reports significant stiffness to her hip which has been impacting ambulation. Pain is sharp and dull intermittently with throbbing, Her pain is improved with medications, and worsened with walking and steps. Her PCP prescribed a Medrol Dose pack which was helpful. Currently using Nicotine via Juul pen. She is 2 years s/p massive MI with stents.

## 2020-02-27 NOTE — ADDENDUM
[FreeTextEntry1] : I, Maki Lu, acted solely as a scribe for Dr. Rusty Mariano on this date 02/27/2020

## 2020-02-27 NOTE — CONSULT LETTER
[Dear  ___] : Dear  [unfilled], [( Thank you for referring [unfilled] for consultation for _____ )] : Thank you for referring [unfilled] for consultation for [unfilled] [Consult Letter:] : I had the pleasure of evaluating your patient, [unfilled]. [Please see my note below.] : Please see my note below. [Consult Closing:] : Thank you very much for allowing me to participate in the care of this patient.  If you have any questions, please do not hesitate to contact me. [Sincerely,] : Sincerely, [FreeTextEntry3] : Dr. Rusty Mariano

## 2020-02-28 ENCOUNTER — APPOINTMENT (OUTPATIENT)
Dept: ORTHOPEDIC SURGERY | Facility: CLINIC | Age: 70
End: 2020-02-28
Payer: MEDICARE

## 2020-02-28 VITALS
BODY MASS INDEX: 40.59 KG/M2 | SYSTOLIC BLOOD PRESSURE: 108 MMHG | WEIGHT: 215 LBS | HEIGHT: 61 IN | DIASTOLIC BLOOD PRESSURE: 86 MMHG | HEART RATE: 98 BPM

## 2020-02-28 DIAGNOSIS — M16.12 UNILATERAL PRIMARY OSTEOARTHRITIS, LEFT HIP: ICD-10-CM

## 2020-02-28 PROCEDURE — 99215 OFFICE O/P EST HI 40 MIN: CPT

## 2020-02-28 RX ORDER — PREDNISONE 10 MG/1
10 TABLET ORAL
Qty: 40 | Refills: 0 | Status: COMPLETED | COMMUNITY
Start: 2018-03-30 | End: 2020-02-28

## 2020-02-28 RX ORDER — PREDNISONE 20 MG/1
20 TABLET ORAL
Qty: 14 | Refills: 0 | Status: COMPLETED | COMMUNITY
Start: 2019-09-04 | End: 2020-02-28

## 2020-02-28 RX ORDER — PREDNISONE 20 MG/1
20 TABLET ORAL
Refills: 0 | Status: COMPLETED | COMMUNITY
End: 2020-02-28

## 2020-02-29 ENCOUNTER — RX RENEWAL (OUTPATIENT)
Age: 70
End: 2020-02-29

## 2020-03-02 ENCOUNTER — FORM ENCOUNTER (OUTPATIENT)
Age: 70
End: 2020-03-02

## 2020-03-02 ENCOUNTER — APPOINTMENT (OUTPATIENT)
Dept: CARDIOLOGY | Facility: CLINIC | Age: 70
End: 2020-03-02
Payer: MEDICARE

## 2020-03-02 PROCEDURE — 93306 TTE W/DOPPLER COMPLETE: CPT

## 2020-03-03 ENCOUNTER — OUTPATIENT (OUTPATIENT)
Dept: OUTPATIENT SERVICES | Facility: HOSPITAL | Age: 70
LOS: 1 days | End: 2020-03-03
Payer: MEDICARE

## 2020-03-03 ENCOUNTER — APPOINTMENT (OUTPATIENT)
Dept: RADIOLOGY | Facility: HOSPITAL | Age: 70
End: 2020-03-03
Payer: MEDICARE

## 2020-03-03 DIAGNOSIS — Z72.0 TOBACCO USE: ICD-10-CM

## 2020-03-03 PROCEDURE — 71046 X-RAY EXAM CHEST 2 VIEWS: CPT | Mod: 26

## 2020-03-03 PROCEDURE — 71046 X-RAY EXAM CHEST 2 VIEWS: CPT

## 2020-03-04 ENCOUNTER — FORM ENCOUNTER (OUTPATIENT)
Age: 70
End: 2020-03-04

## 2020-03-05 ENCOUNTER — OUTPATIENT (OUTPATIENT)
Dept: OUTPATIENT SERVICES | Facility: HOSPITAL | Age: 70
LOS: 1 days | End: 2020-03-05
Payer: MEDICARE

## 2020-03-05 DIAGNOSIS — Z72.0 TOBACCO USE: ICD-10-CM

## 2020-03-05 PROCEDURE — G0297: CPT | Mod: 26

## 2020-03-05 PROCEDURE — G0297: CPT

## 2020-03-06 ENCOUNTER — OUTPATIENT (OUTPATIENT)
Dept: OUTPATIENT SERVICES | Facility: HOSPITAL | Age: 70
LOS: 1 days | End: 2020-03-06
Payer: MEDICARE

## 2020-03-06 DIAGNOSIS — J44.9 CHRONIC OBSTRUCTIVE PULMONARY DISEASE, UNSPECIFIED: ICD-10-CM

## 2020-03-06 PROCEDURE — 94726 PLETHYSMOGRAPHY LUNG VOLUMES: CPT

## 2020-03-06 PROCEDURE — 94729 DIFFUSING CAPACITY: CPT | Mod: 26

## 2020-03-06 PROCEDURE — 94060 EVALUATION OF WHEEZING: CPT

## 2020-03-06 PROCEDURE — 94060 EVALUATION OF WHEEZING: CPT | Mod: 26

## 2020-03-06 PROCEDURE — 94726 PLETHYSMOGRAPHY LUNG VOLUMES: CPT | Mod: 26

## 2020-03-06 PROCEDURE — 94640 AIRWAY INHALATION TREATMENT: CPT

## 2020-03-06 PROCEDURE — 94729 DIFFUSING CAPACITY: CPT

## 2020-03-06 RX ORDER — ALBUTEROL 90 UG/1
2.5 AEROSOL, METERED ORAL ONCE
Refills: 0 | Status: COMPLETED | OUTPATIENT
Start: 2020-03-06 | End: 2020-03-06

## 2020-03-06 RX ADMIN — ALBUTEROL 2.5 MILLIGRAM(S): 90 AEROSOL, METERED ORAL at 14:01

## 2020-03-10 DIAGNOSIS — N28.9 DISORDER OF KIDNEY AND URETER, UNSPECIFIED: ICD-10-CM

## 2020-03-16 ENCOUNTER — OUTPATIENT (OUTPATIENT)
Dept: OUTPATIENT SERVICES | Facility: HOSPITAL | Age: 70
LOS: 1 days | End: 2020-03-16
Payer: MEDICARE

## 2020-03-16 VITALS
HEIGHT: 62 IN | DIASTOLIC BLOOD PRESSURE: 90 MMHG | SYSTOLIC BLOOD PRESSURE: 139 MMHG | OXYGEN SATURATION: 95 % | TEMPERATURE: 98 F | WEIGHT: 209 LBS | HEART RATE: 95 BPM | RESPIRATION RATE: 17 BRPM

## 2020-03-16 DIAGNOSIS — Z90.89 ACQUIRED ABSENCE OF OTHER ORGANS: Chronic | ICD-10-CM

## 2020-03-16 DIAGNOSIS — M16.12 UNILATERAL PRIMARY OSTEOARTHRITIS, LEFT HIP: ICD-10-CM

## 2020-03-16 DIAGNOSIS — Z98.890 OTHER SPECIFIED POSTPROCEDURAL STATES: Chronic | ICD-10-CM

## 2020-03-16 DIAGNOSIS — Z01.818 ENCOUNTER FOR OTHER PREPROCEDURAL EXAMINATION: ICD-10-CM

## 2020-03-16 DIAGNOSIS — I25.10 ATHEROSCLEROTIC HEART DISEASE OF NATIVE CORONARY ARTERY WITHOUT ANGINA PECTORIS: ICD-10-CM

## 2020-03-16 DIAGNOSIS — Z87.09 PERSONAL HISTORY OF OTHER DISEASES OF THE RESPIRATORY SYSTEM: ICD-10-CM

## 2020-03-16 PROCEDURE — 86901 BLOOD TYPING SEROLOGIC RH(D): CPT

## 2020-03-16 PROCEDURE — 83036 HEMOGLOBIN GLYCOSYLATED A1C: CPT

## 2020-03-16 PROCEDURE — 85027 COMPLETE CBC AUTOMATED: CPT

## 2020-03-16 PROCEDURE — 87641 MR-STAPH DNA AMP PROBE: CPT

## 2020-03-16 PROCEDURE — 86850 RBC ANTIBODY SCREEN: CPT

## 2020-03-16 PROCEDURE — 87640 STAPH A DNA AMP PROBE: CPT

## 2020-03-16 PROCEDURE — G0463: CPT

## 2020-03-16 PROCEDURE — 80048 BASIC METABOLIC PNL TOTAL CA: CPT

## 2020-03-16 PROCEDURE — 86900 BLOOD TYPING SEROLOGIC ABO: CPT

## 2020-03-16 NOTE — H&P PST ADULT - ASSESSMENT
CAPRINI SCORE [CLOT]    AGE RELATED RISK FACTORS                                                       MOBILITY RELATED FACTORS  [ ] Age 41-60 years                                            (1 Point)                  [ ] Bed rest                                                        (1 Point)  [ x] Age: 61-74 years                                           (2 Points)                 [ ] Plaster cast                                                   (2 Points)  [ ] Age= 75 years                                              (3 Points)                 [ ] Bed bound for more than 72 hours                 (2 Points)    DISEASE RELATED RISK FACTORS                                               GENDER SPECIFIC FACTORS  [ ] Edema in the lower extremities                       (1 Point)                  [ ] Pregnancy                                                     (1 Point)  [ ] Varicose veins                                               (1 Point)                  [ ] Post-partum < 6 weeks                                   (1 Point)             [x ] BMI > 25 Kg/m2                                            (1 Point)                  [ ] Hormonal therapy  or oral contraception          (1 Point)                 [ ] Sepsis (in the previous month)                        (1 Point)                  [ ] History of pregnancy complications                 (1 point)  [ ] Pneumonia or serious lung disease                                               [ ] Unexplained or recurrent                     (1 Point)           (in the previous month)                               (1 Point)  [ ] Abnormal pulmonary function test                     (1 Point)                 SURGERY RELATED RISK FACTORS  [ ] Acute myocardial infarction                              (1 Point)                 [ ]  Section                                             (1 Point)  [ ] Congestive heart failure (in the previous month)  (1 Point)               [ ] Minor surgery                                                  (1 Point)   [ ] Inflammatory bowel disease                             (1 Point)                 [ ] Arthroscopic surgery                                        (2 Points)  [ ] Central venous access                                      (2 Points)                [ ] General surgery lasting more than 45 minutes   (2 Points)       [ ] Stroke (in the previous month)                          (5 Points)               [x ] Elective arthroplasty                                         (5 Points)                                                                                                                                               HEMATOLOGY RELATED FACTORS                                                 TRAUMA RELATED RISK FACTORS  [ ] Prior episodes of VTE                                     (3 Points)                 [ ] Fracture of the hip, pelvis, or leg                       (5 Points)  [ ] Positive family history for VTE                         (3 Points)                 [ ] Acute spinal cord injury (in the previous month)  (5 Points)  [ ] Prothrombin 23423 A                                     (3 Points)                 [ ] Paralysis  (less than 1 month)                             (5 Points)  [ ] Factor V Leiden                                             (3 Points)                  [ ] Multiple Trauma within 1 month                        (5 Points)  [ ] Lupus anticoagulants                                     (3 Points)                                                           [ ] Anticardiolipin antibodies                               (3 Points)                                                       [ ] High homocysteine in the blood                      (3 Points)                                             [ ] Other congenital or acquired thrombophilia      (3 Points)                                                [ ] Heparin induced thrombocytopenia                  (3 Points)                                          Total Score [     8     ]  The Caprini score indicates that this patient is at high risk for a VTE event (score = 6).    Surgical patients in this group will benefit from both pharmacologic prophylaxis and intermittent compression devices.    The surgical team will determine the balance between VTE risk and bleeding risk, and other clinical considerations.

## 2020-03-16 NOTE — H&P PST ADULT - NSICDXPASTMEDICALHX_GEN_ALL_CORE_FT
PAST MEDICAL HISTORY:  Benign hypertension     Bipolar disorder oral medication    CAD (coronary artery disease)     Cancer of skin multiple locations several have been removed and or laser    face (forehead), nose, arms    COPD (chronic obstructive pulmonary disease)     H/O gastroesophageal reflux (GERD)     Myocardial infarct, old 2016    Unilateral primary osteoarthritis, left hip PAST MEDICAL HISTORY:  Anxiousness concern re surgery    Benign hypertension     Bipolar disorder oral medication    CAD (coronary artery disease)     Cancer of skin multiple locations several have been removed and or laser    face (forehead), nose, arms    COPD (chronic obstructive pulmonary disease)     Gait disturbance     H/O gastroesophageal reflux (GERD)     Myocardial infarct, old 2016    Unilateral primary osteoarthritis, left hip     Weight loss attempting to loss weight states so far has lost about 10 pounds

## 2020-03-16 NOTE — H&P PST ADULT - HISTORY OF PRESENT ILLNESS
69 year old female with left hip pain difficulty walking  has "sciatica left groin" has been on prednisone taper. 69 year old female with left hip pain difficulty walking  has "sciatica left groin" has been on prednisone taper. Referred to orthopedics for hip replacement.    * Smoker is Vaping . Is trying to cut back to no and using Chantix. has CT scan of lungs and kidney scheduled pre op to surgery obtain pulmonary note, Pulmonary tests and CT results Dr. Ruiz [phone]  ** Hx of CAD s/p angioplasty with stents on Brilinta  Instructed to stop medication 7 days prior. To stay on aspirin. Has appointment with Dr. Garcia ([phone]) on  3/19  to confirm plan.  Obtain note and  cardiac tests including  EKG, echo stress.

## 2020-03-17 ENCOUNTER — OUTPATIENT (OUTPATIENT)
Dept: OUTPATIENT SERVICES | Facility: HOSPITAL | Age: 70
LOS: 1 days | End: 2020-03-17
Payer: MEDICARE

## 2020-03-17 ENCOUNTER — RESULT REVIEW (OUTPATIENT)
Age: 70
End: 2020-03-17

## 2020-03-17 ENCOUNTER — APPOINTMENT (OUTPATIENT)
Dept: CT IMAGING | Facility: HOSPITAL | Age: 70
End: 2020-03-17
Payer: MEDICARE

## 2020-03-17 DIAGNOSIS — Z98.890 OTHER SPECIFIED POSTPROCEDURAL STATES: Chronic | ICD-10-CM

## 2020-03-17 DIAGNOSIS — N28.9 DISORDER OF KIDNEY AND URETER, UNSPECIFIED: ICD-10-CM

## 2020-03-17 DIAGNOSIS — Z90.89 ACQUIRED ABSENCE OF OTHER ORGANS: Chronic | ICD-10-CM

## 2020-03-17 PROBLEM — I10 ESSENTIAL (PRIMARY) HYPERTENSION: Chronic | Status: ACTIVE | Noted: 2020-03-16

## 2020-03-17 PROBLEM — J44.9 CHRONIC OBSTRUCTIVE PULMONARY DISEASE, UNSPECIFIED: Chronic | Status: ACTIVE | Noted: 2020-03-16

## 2020-03-17 PROBLEM — M16.12 UNILATERAL PRIMARY OSTEOARTHRITIS, LEFT HIP: Chronic | Status: ACTIVE | Noted: 2020-03-16

## 2020-03-17 PROBLEM — C44.90 UNSPECIFIED MALIGNANT NEOPLASM OF SKIN, UNSPECIFIED: Chronic | Status: ACTIVE | Noted: 2020-03-16

## 2020-03-17 PROBLEM — R63.4 ABNORMAL WEIGHT LOSS: Chronic | Status: ACTIVE | Noted: 2020-03-16

## 2020-03-17 PROBLEM — I25.2 OLD MYOCARDIAL INFARCTION: Chronic | Status: ACTIVE | Noted: 2020-03-16

## 2020-03-17 PROBLEM — F41.9 ANXIETY DISORDER, UNSPECIFIED: Chronic | Status: ACTIVE | Noted: 2020-03-16

## 2020-03-17 PROBLEM — Z87.19 PERSONAL HISTORY OF OTHER DISEASES OF THE DIGESTIVE SYSTEM: Chronic | Status: ACTIVE | Noted: 2020-03-16

## 2020-03-17 PROBLEM — F31.9 BIPOLAR DISORDER, UNSPECIFIED: Chronic | Status: ACTIVE | Noted: 2018-06-25

## 2020-03-17 PROBLEM — R26.9 UNSPECIFIED ABNORMALITIES OF GAIT AND MOBILITY: Chronic | Status: ACTIVE | Noted: 2020-03-16

## 2020-03-17 PROCEDURE — 74170 CT ABD WO CNTRST FLWD CNTRST: CPT

## 2020-03-17 PROCEDURE — 74170 CT ABD WO CNTRST FLWD CNTRST: CPT | Mod: 26

## 2020-03-18 DIAGNOSIS — E27.9 DISORDER OF ADRENAL GLAND, UNSPECIFIED: ICD-10-CM

## 2020-03-19 ENCOUNTER — APPOINTMENT (OUTPATIENT)
Dept: CARDIOLOGY | Facility: CLINIC | Age: 70
End: 2020-03-19
Payer: MEDICARE

## 2020-03-19 PROCEDURE — 99215 OFFICE O/P EST HI 40 MIN: CPT | Mod: 25

## 2020-03-19 PROCEDURE — A9500: CPT

## 2020-03-19 PROCEDURE — 78452 HT MUSCLE IMAGE SPECT MULT: CPT

## 2020-03-19 PROCEDURE — 93015 CV STRESS TEST SUPVJ I&R: CPT

## 2020-03-23 ENCOUNTER — APPOINTMENT (OUTPATIENT)
Dept: FAMILY MEDICINE | Facility: CLINIC | Age: 70
End: 2020-03-23

## 2020-03-24 NOTE — CARDIOLOGY SUMMARY
[___] : [unfilled] [LVEF ___%] : LVEF [unfilled]% [None] : no pulmonary hypertension [Normal] : normal LA size [Mild] : mild mitral regurgitation [No Ischemia] : no Ischemia

## 2020-03-24 NOTE — PHYSICAL EXAM
[General Appearance - Well Developed] : well developed [Normal Appearance] : normal appearance [Well Groomed] : well groomed [General Appearance - Well Nourished] : well nourished [No Deformities] : no deformities [General Appearance - In No Acute Distress] : no acute distress [Normal Conjunctiva] : the conjunctiva exhibited no abnormalities [Eyelids - No Xanthelasma] : the eyelids demonstrated no xanthelasmas [Normal Oral Mucosa] : normal oral mucosa [No Oral Pallor] : no oral pallor [No Oral Cyanosis] : no oral cyanosis [Normal Jugular Venous A Waves Present] : normal jugular venous A waves present [Normal Jugular Venous V Waves Present] : normal jugular venous V waves present [No Jugular Venous Fierro A Waves] : no jugular venous fierro A waves [Respiration, Rhythm And Depth] : normal respiratory rhythm and effort [Exaggerated Use Of Accessory Muscles For Inspiration] : no accessory muscle use [Auscultation Breath Sounds / Voice Sounds] : lungs were clear to auscultation bilaterally [Abdomen Soft] : soft [Abdomen Tenderness] : non-tender [Abdomen Mass (___ Cm)] : no abdominal mass palpated [Nail Clubbing] : no clubbing of the fingernails [Cyanosis, Localized] : no localized cyanosis [Petechial Hemorrhages (___cm)] : no petechial hemorrhages [Skin Color & Pigmentation] : normal skin color and pigmentation [] : no rash [No Venous Stasis] : no venous stasis [Skin Lesions] : no skin lesions [No Skin Ulcers] : no skin ulcer [No Xanthoma] : no  xanthoma was observed [Oriented To Time, Place, And Person] : oriented to person, place, and time [Affect] : the affect was normal [Mood] : the mood was normal [No Anxiety] : not feeling anxious [Normal Rate] : normal [Normal S1] : normal S1 [Normal S2] : normal S2 [No Murmur] : no murmurs heard [2+] : left 2+ [No Abnormalities] : the abdominal aorta was not enlarged and no bruit was heard [No Pitting Edema] : no pitting edema present [FreeTextEntry1] : pt cant walk on treadmill, needs hip surgery [S3] : no S3 [S4] : no S4 [Right Carotid Bruit] : no bruit heard over the right carotid [Left Carotid Bruit] : no bruit heard over the left carotid [Right Femoral Bruit] : no bruit heard over the right femoral artery [Left Femoral Bruit] : no bruit heard over the left femoral artery

## 2020-03-24 NOTE — HISTORY OF PRESENT ILLNESS
[Preoperative Visit] : for a medical evaluation prior to surgery [Scheduled Procedure ___] : a [unfilled] [Surgeon Name ___] : surgeon: [unfilled] [Urinary Frequency] : urinary frequency [Poor Exercise Tolerance] : poor exercise tolerance [Cardiovascular Disease] : cardiovascular disease [Nicotine Dependence] : nicotine dependence [Frequent use of NSAIDs] : frequent use of NSAIDs [Steroid Use in Last 6 Months] : steroid use in the last six months [Frequent Aspirin Use] : frequent aspirin use [Prior Anesthesia] : Prior anesthesia [Electrocardiogram] : ~T an ECG ~C was performed [Cardiac Catheterization  (Diagnostic)] : cardiac catheterization ~T ~C was performed [Cardiovascular Stress Test] : a cardiac stress test ~T ~C was performed [Fever] : no fever [Chills] : no chills [Fatigue] : no fatigue [Chest Pain] : no chest pain [Cough] : no cough [Dyspnea] : no dyspnea [Dysuria] : no dysuria [Nausea] : no nausea [Vomiting] : no vomiting [Diarrhea] : no diarrhea [Abdominal Pain] : no abdominal pain [Easy Bruising] : no easy bruising [Lower Extremity Swelling] : no lower extremity swelling [Diabetes] : no diabetes [Pulmonary Disease] : no pulmonary disease [Anti-Platelet Agents] : no anti-platelet agents [Alcohol Use] : no  alcohol use [Renal Disease] : no renal disease [GI Disease] : no gastrointestinal disease [Sleep Apnea] : no sleep apnea [Thromboembolic Problems] : no thromboembolic problems [Transfusion Reaction] : no transfusion reaction [Impaired Immunity] : no impaired immunity [Prev Anesthesia Reaction] : no previous anesthesia reaction [Anesthesia Reaction] : no anesthesia reaction [Sudden Death] : no sudden death [Clotting Disorder] : no clotting disorder [Bleeding Disorder] : no bleeding disorder

## 2020-03-25 ENCOUNTER — APPOINTMENT (OUTPATIENT)
Dept: ORTHOPEDIC SURGERY | Facility: CLINIC | Age: 70
End: 2020-03-25

## 2020-03-27 ENCOUNTER — RX RENEWAL (OUTPATIENT)
Age: 70
End: 2020-03-27

## 2020-03-31 ENCOUNTER — APPOINTMENT (OUTPATIENT)
Dept: ORTHOPEDIC SURGERY | Facility: HOSPITAL | Age: 70
End: 2020-03-31

## 2020-05-26 ENCOUNTER — OUTPATIENT (OUTPATIENT)
Dept: OUTPATIENT SERVICES | Facility: HOSPITAL | Age: 70
LOS: 1 days | End: 2020-05-26
Payer: MEDICARE

## 2020-05-26 VITALS
WEIGHT: 190.04 LBS | RESPIRATION RATE: 15 BRPM | DIASTOLIC BLOOD PRESSURE: 76 MMHG | HEART RATE: 83 BPM | HEIGHT: 62 IN | TEMPERATURE: 97 F | OXYGEN SATURATION: 96 % | SYSTOLIC BLOOD PRESSURE: 118 MMHG

## 2020-05-26 DIAGNOSIS — Z01.818 ENCOUNTER FOR OTHER PREPROCEDURAL EXAMINATION: ICD-10-CM

## 2020-05-26 DIAGNOSIS — M16.12 UNILATERAL PRIMARY OSTEOARTHRITIS, LEFT HIP: ICD-10-CM

## 2020-05-26 DIAGNOSIS — N39.0 URINARY TRACT INFECTION, SITE NOT SPECIFIED: ICD-10-CM

## 2020-05-26 DIAGNOSIS — R82.90 UNSPECIFIED ABNORMAL FINDINGS IN URINE: ICD-10-CM

## 2020-05-26 DIAGNOSIS — Z90.89 ACQUIRED ABSENCE OF OTHER ORGANS: Chronic | ICD-10-CM

## 2020-05-26 DIAGNOSIS — I25.10 ATHEROSCLEROTIC HEART DISEASE OF NATIVE CORONARY ARTERY WITHOUT ANGINA PECTORIS: ICD-10-CM

## 2020-05-26 DIAGNOSIS — Z98.890 OTHER SPECIFIED POSTPROCEDURAL STATES: Chronic | ICD-10-CM

## 2020-05-26 PROCEDURE — 87641 MR-STAPH DNA AMP PROBE: CPT

## 2020-05-26 PROCEDURE — 86850 RBC ANTIBODY SCREEN: CPT

## 2020-05-26 PROCEDURE — G0463: CPT

## 2020-05-26 PROCEDURE — 87640 STAPH A DNA AMP PROBE: CPT

## 2020-05-26 PROCEDURE — 85027 COMPLETE CBC AUTOMATED: CPT

## 2020-05-26 PROCEDURE — 83036 HEMOGLOBIN GLYCOSYLATED A1C: CPT

## 2020-05-26 PROCEDURE — 86901 BLOOD TYPING SEROLOGIC RH(D): CPT

## 2020-05-26 PROCEDURE — 86900 BLOOD TYPING SEROLOGIC ABO: CPT

## 2020-05-26 PROCEDURE — 80048 BASIC METABOLIC PNL TOTAL CA: CPT

## 2020-05-26 RX ORDER — CHLORHEXIDINE GLUCONATE 213 G/1000ML
1 SOLUTION TOPICAL ONCE
Refills: 0 | Status: DISCONTINUED | OUTPATIENT
Start: 2020-05-30 | End: 2020-06-02

## 2020-05-26 RX ORDER — CEFAZOLIN SODIUM 1 G
2000 VIAL (EA) INJECTION ONCE
Refills: 0 | Status: DISCONTINUED | OUTPATIENT
Start: 2020-05-30 | End: 2020-06-02

## 2020-05-26 NOTE — H&P PST ADULT - ASSESSMENT
CAPRINI SCORE [CLOT]    AGE RELATED RISK FACTORS                                                       MOBILITY RELATED FACTORS  [ ] Age 41-60 years                                            (1 Point)                  [ ] Bed rest                                                        (1 Point)  [ x] Age: 61-74 years                                           (2 Points)                 [ ] Plaster cast                                                   (2 Points)  [ ] Age= 75 years                                              (3 Points)                 [ ] Bed bound for more than 72 hours                 (2 Points)    DISEASE RELATED RISK FACTORS                                               GENDER SPECIFIC FACTORS  [ ] Edema in the lower extremities                       (1 Point)                  [ ] Pregnancy                                                     (1 Point)  [ ] Varicose veins                                               (1 Point)                  [ ] Post-partum < 6 weeks                                   (1 Point)             [x ] BMI > 25 Kg/m2                                            (1 Point)                  [ ] Hormonal therapy  or oral contraception          (1 Point)                 [ ] Sepsis (in the previous month)                        (1 Point)                  [ ] History of pregnancy complications                 (1 point)  [ ] Pneumonia or serious lung disease                                               [ ] Unexplained or recurrent                     (1 Point)           (in the previous month)                               (1 Point)  [ ] Abnormal pulmonary function test                     (1 Point)                 SURGERY RELATED RISK FACTORS  [ ] Acute myocardial infarction                              (1 Point)                 [ ]  Section                                             (1 Point)  [ ] Congestive heart failure (in the previous month)  (1 Point)               [ ] Minor surgery                                                  (1 Point)   [ ] Inflammatory bowel disease                             (1 Point)                 [ ] Arthroscopic surgery                                        (2 Points)  [ ] Central venous access                                      (2 Points)                [ ] General surgery lasting more than 45 minutes   (2 Points)       [ ] Stroke (in the previous month)                          (5 Points)               [x ] Elective arthroplasty                                         (5 Points)                                                                                                                                               HEMATOLOGY RELATED FACTORS                                                 TRAUMA RELATED RISK FACTORS  [ ] Prior episodes of VTE                                     (3 Points)                 [ ] Fracture of the hip, pelvis, or leg                       (5 Points)  [ ] Positive family history for VTE                         (3 Points)                 [ ] Acute spinal cord injury (in the previous month)  (5 Points)  [ ] Prothrombin 39110 A                                     (3 Points)                 [ ] Paralysis  (less than 1 month)                             (5 Points)  [ ] Factor V Leiden                                             (3 Points)                  [ ] Multiple Trauma within 1 month                        (5 Points)  [ ] Lupus anticoagulants                                     (3 Points)                                                           [ ] Anticardiolipin antibodies                               (3 Points)                                                       [ ] High homocysteine in the blood                      (3 Points)                                             [ ] Other congenital or acquired thrombophilia      (3 Points)                                                [ ] Heparin induced thrombocytopenia                  (3 Points)                                          Total Score [  8        ]  The Caprini score indicates that this patient is at high risk for a VTE event (score = 6).    Surgical patients in this group will benefit from both pharmacologic prophylaxis and intermittent compression devices.    The surgical team will determine the balance between VTE risk and bleeding risk, and other clinical considerations.     Unilateral primary osteoarthritis left hip

## 2020-05-26 NOTE — H&P PST ADULT - NSICDXPASTMEDICALHX_GEN_ALL_CORE_FT
PAST MEDICAL HISTORY:  Anxiousness concern re surgery    Benign hypertension     Bipolar disorder oral medication    CAD (coronary artery disease)     Cancer of skin multiple locations several have been removed and or laser    face (forehead), nose, arms    COPD (chronic obstructive pulmonary disease)     Gait disturbance     H/O gastroesophageal reflux (GERD)     Myocardial infarct, old 2016    Unilateral primary osteoarthritis, left hip     Weight loss attempting to loss weight states so far has lost about 10 pounds PAST MEDICAL HISTORY:  Anxiousness concern re surgery    Benign hypertension     Bipolar disorder oral medication    CAD (coronary artery disease)     Cancer of skin multiple locations several have been removed and or laser    face (forehead), nose, arms    Cloudy urine     COPD (chronic obstructive pulmonary disease)     Gait disturbance     H/O gastroesophageal reflux (GERD)     Myocardial infarct, old 2016    Unilateral primary osteoarthritis, left hip     Weight loss attempting to loss weight states so far has lost about 10 pounds

## 2020-05-26 NOTE — H&P PST ADULT - HISTORY OF PRESENT ILLNESS
69 year old female with hx of CAD, Bipolar Disease, Osteoarthritis of hip. Has been having increase pain left hip when walking. Scheduled for Total hip 5/30    **On Brilinta  and aspirin for drug eluding stents placed 2016. To stop Brilinta  today continue on aspirin.   *** Pt reports she was seen by Dr. Lozoya  (urology) today for cloudy urine obtain note and test results  ([phone])

## 2020-05-26 NOTE — H&P PST ADULT - PSYCHIATRIC
The patient will be given a course of antibiotics, but if his PSA does not return to historic levels, we will consider  ultrasound with biopsy  details… Affect and characteristics of appearance, verbalizations, behaviors are appropriate

## 2020-05-26 NOTE — H&P PST ADULT - NSICDXPROBLEM_GEN_ALL_CORE_FT
PROBLEM DIAGNOSES  Problem: Cloudy urine  Assessment and Plan: pt reports she was seen today by Dr. Jennings for work up of cloudy urine obtain note and test results     Problem: CAD (coronary artery disease)  Assessment and Plan: Last dose Brilinta 5/26 stay on aspirin    Problem: Unilateral primary osteoarthritis, left hip  Assessment and Plan: Left Anterior Approach Total Hip Replacement PROBLEM DIAGNOSES  Problem: Cloudy urine  Assessment and Plan: pt reports she was seen today by Dr. Lozoya for work up of cloudy urine obtain note and test results     Problem: CAD (coronary artery disease)  Assessment and Plan: Last dose Brilinta 5/26 stay on aspirin    Problem: Unilateral primary osteoarthritis, left hip  Assessment and Plan: Left Anterior Approach Total Hip Replacement

## 2020-05-27 ENCOUNTER — APPOINTMENT (OUTPATIENT)
Dept: FAMILY MEDICINE | Facility: CLINIC | Age: 70
End: 2020-05-27
Payer: MEDICARE

## 2020-05-27 VITALS
WEIGHT: 196 LBS | OXYGEN SATURATION: 95 % | RESPIRATION RATE: 16 BRPM | BODY MASS INDEX: 37.03 KG/M2 | SYSTOLIC BLOOD PRESSURE: 120 MMHG | TEMPERATURE: 97.4 F | HEART RATE: 97 BPM | DIASTOLIC BLOOD PRESSURE: 70 MMHG

## 2020-05-27 DIAGNOSIS — A49.01 METHICILLIN SUSCEPTIBLE STAPHYLOCOCCUS AUREUS INFECTION, UNSPECIFIED SITE: ICD-10-CM

## 2020-05-27 DIAGNOSIS — Z72.0 TOBACCO USE: ICD-10-CM

## 2020-05-27 DIAGNOSIS — F17.200 NICOTINE DEPENDENCE, UNSPECIFIED, UNCOMPLICATED: ICD-10-CM

## 2020-05-27 DIAGNOSIS — Z86.69 PERSONAL HISTORY OF OTHER DISEASES OF THE NERVOUS SYSTEM AND SENSE ORGANS: ICD-10-CM

## 2020-05-27 PROCEDURE — 99214 OFFICE O/P EST MOD 30 MIN: CPT | Mod: PD

## 2020-05-27 RX ORDER — NITROGLYCERIN 0.4 MG/1
0.4 TABLET SUBLINGUAL
Qty: 30 | Refills: 3 | Status: DISCONTINUED | COMMUNITY
Start: 2018-12-12 | End: 2020-05-27

## 2020-05-27 RX ORDER — VARENICLINE TARTRATE 0.5 (11)-1
0.5 MG X 11 & KIT ORAL
Qty: 1 | Refills: 0 | Status: DISCONTINUED | COMMUNITY
Start: 2019-02-26 | End: 2020-05-27

## 2020-05-27 RX ORDER — CELECOXIB 100 MG/1
100 CAPSULE ORAL TWICE DAILY
Qty: 60 | Refills: 0 | Status: DISCONTINUED | COMMUNITY
Start: 2020-03-18 | End: 2020-05-27

## 2020-05-27 RX ORDER — VARENICLINE TARTRATE 0.5 (11)-1
0.5 MG X 11 & KIT ORAL
Qty: 1 | Refills: 0 | Status: DISCONTINUED | COMMUNITY
Start: 2020-03-03 | End: 2020-05-27

## 2020-05-27 RX ORDER — METHYLPREDNISOLONE 4 MG/1
4 TABLET ORAL
Qty: 1 | Refills: 0 | Status: DISCONTINUED | COMMUNITY
Start: 2020-02-28 | End: 2020-05-27

## 2020-05-27 RX ORDER — CHOLECALCIFEROL (VITAMIN D3) 1250 MCG
1.25 MG CAPSULE ORAL
Qty: 8 | Refills: 1 | Status: DISCONTINUED | COMMUNITY
Start: 2020-02-11 | End: 2020-05-27

## 2020-05-27 RX ORDER — LAMOTRIGINE 100 MG/1
100 TABLET ORAL
Qty: 75 | Refills: 0 | Status: DISCONTINUED | COMMUNITY
Start: 2020-02-25 | End: 2020-05-27

## 2020-05-27 RX ORDER — BUPROPION HYDROCHLORIDE 150 MG/1
150 TABLET, EXTENDED RELEASE ORAL
Qty: 30 | Refills: 0 | Status: DISCONTINUED | COMMUNITY
Start: 2020-02-10 | End: 2020-05-27

## 2020-05-27 RX ORDER — VARENICLINE TARTRATE 1 MG/1
1 TABLET, FILM COATED ORAL
Qty: 30 | Refills: 3 | Status: DISCONTINUED | COMMUNITY
Start: 2019-04-09 | End: 2020-05-27

## 2020-05-27 RX ORDER — LAMOTRIGINE 200 MG/1
200 TABLET ORAL
Qty: 30 | Refills: 3 | Status: DISCONTINUED | COMMUNITY
Start: 2017-06-14 | End: 2020-05-27

## 2020-05-27 NOTE — HISTORY OF PRESENT ILLNESS
[Coronary Artery Disease] : coronary artery disease [COPD] : COPD [Smoker] : smoker [(Patient denies any chest pain, claudication, dyspnea on exertion, orthopnea, palpitations or syncope)] : Patient denies any chest pain, claudication, dyspnea on exertion, orthopnea, palpitations or syncope [Poor (<4 METs)] : Poor (<4 METs) [Anti-Platelet Agents: _____] : Anti-Platelet Agents: [unfilled] [Other: _____] : [unfilled] [Aortic Stenosis] : no aortic stenosis [Implantable Device/Pacemaker] : no implantable device/pacemaker [Atrial Fibrillation] : no atrial fibrillation [Recent Myocardial Infarction] : no recent myocardial infarction [Family Member] : no family member with adverse anesthesia reaction/sudden death [Sleep Apnea] : no sleep apnea [Asthma] : no asthma [Self] : no previous adverse anesthesia reaction [Chronic Anticoagulation] : no chronic anticoagulation [Chronic Kidney Disease] : no chronic kidney disease [Diabetes] : no diabetes [FreeTextEntry3] : Dr. Chapa, phone: [phone] [FreeTextEntry1] : left hip [FreeTextEntry2] : 5/30/2020 [FreeTextEntry5] : stopped vaping nicotine a few days ago; was using nicotine vape, stopped cigarettes 1 year ago. Taking baby aspirin once daily.  [FreeTextEntry8] : exercise limited due to pain, difficult to ascertain true METs

## 2020-05-27 NOTE — COUNSELING
[Weight management counseling provided] : Weight management [Healthy eating counseling provided] : healthy eating [Activity counseling provided] : activity [Discussed Risks and Advised to Quit Smoking] : Discussed risks and advised to quit smoking [Decrease Portions] : Decrease food portions

## 2020-05-27 NOTE — ASSESSMENT
[As per surgery] : as per surgery [Patient Optimized for Surgery] : Patient optimized for surgery [No Further Testing Recommended] : no further testing recommended [Modify medications prior to procedure] : Modify medications prior to procedure [FreeTextEntry7] : morning of surgery take only the following meds with sips of water only: lamictal 250 mg, cozaar 25 mg, protonix 40 mg, correg 3.125 mg

## 2020-05-27 NOTE — PHYSICAL EXAM
[No Acute Distress] : no acute distress [Well Nourished] : well nourished [Well-Appearing] : well-appearing [Well Developed] : well developed [Normal Sclera/Conjunctiva] : normal sclera/conjunctiva [Normal Outer Ear/Nose] : the outer ears and nose were normal in appearance [EOMI] : extraocular movements intact [PERRL] : pupils equal round and reactive to light [No Lymphadenopathy] : no lymphadenopathy [Normal Oropharynx] : the oropharynx was normal [No JVD] : no jugular venous distention [Thyroid Normal, No Nodules] : the thyroid was normal and there were no nodules present [Supple] : supple [No Respiratory Distress] : no respiratory distress  [No Accessory Muscle Use] : no accessory muscle use [Clear to Auscultation] : lungs were clear to auscultation bilaterally [Normal Rate] : normal rate  [Normal S1, S2] : normal S1 and S2 [Regular Rhythm] : with a regular rhythm [Pedal Pulses Present] : the pedal pulses are present [No Extremity Clubbing/Cyanosis] : no extremity clubbing/cyanosis [No Edema] : there was no peripheral edema [Non-distended] : non-distended [Soft] : abdomen soft [Non Tender] : non-tender [No HSM] : no HSM [No Masses] : no abdominal mass palpated [Normal Posterior Cervical Nodes] : no posterior cervical lymphadenopathy [Normal Bowel Sounds] : normal bowel sounds [Normal Anterior Cervical Nodes] : no anterior cervical lymphadenopathy [No CVA Tenderness] : no CVA  tenderness [No Spinal Tenderness] : no spinal tenderness [No Joint Swelling] : no joint swelling [Coordination Grossly Intact] : coordination grossly intact [Grossly Normal Strength/Tone] : grossly normal strength/tone [No Rash] : no rash [No Focal Deficits] : no focal deficits [Normal Gait] : normal gait [Normal Affect] : the affect was normal [Normal Insight/Judgement] : insight and judgment were intact [Normal TMs] : both tympanic membranes were normal [Normal Voice/Communication] : normal voice/communication [Speech Grossly Normal] : speech grossly normal [Alert and Oriented x3] : oriented to person, place, and time [Memory Grossly Normal] : memory grossly normal [de-identified] : left lateral second toe paronychia [de-identified] : +anxiety

## 2020-05-28 ENCOUNTER — OUTPATIENT (OUTPATIENT)
Dept: OUTPATIENT SERVICES | Facility: HOSPITAL | Age: 70
LOS: 1 days | End: 2020-05-28

## 2020-05-28 DIAGNOSIS — Z11.59 ENCOUNTER FOR SCREENING FOR OTHER VIRAL DISEASES: ICD-10-CM

## 2020-05-28 DIAGNOSIS — Z90.89 ACQUIRED ABSENCE OF OTHER ORGANS: Chronic | ICD-10-CM

## 2020-05-28 DIAGNOSIS — Z98.890 OTHER SPECIFIED POSTPROCEDURAL STATES: Chronic | ICD-10-CM

## 2020-05-28 PROBLEM — R82.90 UNSPECIFIED ABNORMAL FINDINGS IN URINE: Chronic | Status: ACTIVE | Noted: 2020-05-26

## 2020-05-29 ENCOUNTER — TRANSCRIPTION ENCOUNTER (OUTPATIENT)
Age: 70
End: 2020-05-29

## 2020-05-29 LAB — SARS-COV-2 RNA SPEC QL NAA+PROBE: SIGNIFICANT CHANGE UP

## 2020-05-30 ENCOUNTER — APPOINTMENT (OUTPATIENT)
Dept: ORTHOPEDIC SURGERY | Facility: HOSPITAL | Age: 70
End: 2020-05-30

## 2020-05-30 ENCOUNTER — TRANSCRIPTION ENCOUNTER (OUTPATIENT)
Age: 70
End: 2020-05-30

## 2020-05-30 ENCOUNTER — INPATIENT (INPATIENT)
Facility: HOSPITAL | Age: 70
LOS: 2 days | Discharge: NURSING, REHAB OR LONG-TERM CARE | DRG: 470 | End: 2020-06-02
Attending: ORTHOPAEDIC SURGERY | Admitting: ORTHOPAEDIC SURGERY
Payer: MEDICARE

## 2020-05-30 VITALS
DIASTOLIC BLOOD PRESSURE: 76 MMHG | OXYGEN SATURATION: 93 % | HEART RATE: 86 BPM | TEMPERATURE: 98 F | RESPIRATION RATE: 18 BRPM | SYSTOLIC BLOOD PRESSURE: 117 MMHG | HEIGHT: 62 IN | WEIGHT: 190.04 LBS

## 2020-05-30 DIAGNOSIS — M16.12 UNILATERAL PRIMARY OSTEOARTHRITIS, LEFT HIP: ICD-10-CM

## 2020-05-30 DIAGNOSIS — Z90.89 ACQUIRED ABSENCE OF OTHER ORGANS: Chronic | ICD-10-CM

## 2020-05-30 DIAGNOSIS — Z98.890 OTHER SPECIFIED POSTPROCEDURAL STATES: Chronic | ICD-10-CM

## 2020-05-30 LAB — GLUCOSE BLDC GLUCOMTR-MCNC: 110 MG/DL — HIGH (ref 70–99)

## 2020-05-30 PROCEDURE — 27130 TOTAL HIP ARTHROPLASTY: CPT | Mod: LT

## 2020-05-30 PROCEDURE — 72170 X-RAY EXAM OF PELVIS: CPT | Mod: 26

## 2020-05-30 RX ORDER — KETOROLAC TROMETHAMINE 30 MG/ML
15 SYRINGE (ML) INJECTION ONCE
Refills: 0 | Status: DISCONTINUED | OUTPATIENT
Start: 2020-05-30 | End: 2020-05-30

## 2020-05-30 RX ORDER — IBUPROFEN 200 MG
1 TABLET ORAL
Qty: 0 | Refills: 0 | DISCHARGE

## 2020-05-30 RX ORDER — CEFAZOLIN SODIUM 1 G
2000 VIAL (EA) INJECTION EVERY 8 HOURS
Refills: 0 | Status: COMPLETED | OUTPATIENT
Start: 2020-05-30 | End: 2020-05-31

## 2020-05-30 RX ORDER — MAGNESIUM HYDROXIDE 400 MG/1
30 TABLET, CHEWABLE ORAL ONCE
Refills: 0 | Status: DISCONTINUED | OUTPATIENT
Start: 2020-05-30 | End: 2020-06-02

## 2020-05-30 RX ORDER — LOSARTAN POTASSIUM 100 MG/1
25 TABLET, FILM COATED ORAL DAILY
Refills: 0 | Status: DISCONTINUED | OUTPATIENT
Start: 2020-05-31 | End: 2020-06-02

## 2020-05-30 RX ORDER — CELECOXIB 200 MG/1
200 CAPSULE ORAL EVERY 12 HOURS
Refills: 0 | Status: DISCONTINUED | OUTPATIENT
Start: 2020-05-30 | End: 2020-06-02

## 2020-05-30 RX ORDER — SODIUM CHLORIDE 9 MG/ML
1000 INJECTION, SOLUTION INTRAVENOUS ONCE
Refills: 0 | Status: COMPLETED | OUTPATIENT
Start: 2020-05-30 | End: 2020-05-31

## 2020-05-30 RX ORDER — CELECOXIB 200 MG/1
1 CAPSULE ORAL
Qty: 0 | Refills: 0 | DISCHARGE

## 2020-05-30 RX ORDER — NITROGLYCERIN 6.5 MG
0.4 CAPSULE, EXTENDED RELEASE ORAL
Refills: 0 | Status: DISCONTINUED | OUTPATIENT
Start: 2020-05-30 | End: 2020-06-02

## 2020-05-30 RX ORDER — ASPIRIN/CALCIUM CARB/MAGNESIUM 324 MG
1 TABLET ORAL
Qty: 60 | Refills: 0
Start: 2020-05-30 | End: 2020-06-28

## 2020-05-30 RX ORDER — HYDROMORPHONE HYDROCHLORIDE 2 MG/ML
0.5 INJECTION INTRAMUSCULAR; INTRAVENOUS; SUBCUTANEOUS ONCE
Refills: 0 | Status: DISCONTINUED | OUTPATIENT
Start: 2020-05-30 | End: 2020-05-30

## 2020-05-30 RX ORDER — HYDROMORPHONE HYDROCHLORIDE 2 MG/ML
0.5 INJECTION INTRAMUSCULAR; INTRAVENOUS; SUBCUTANEOUS
Refills: 0 | Status: DISCONTINUED | OUTPATIENT
Start: 2020-05-30 | End: 2020-05-30

## 2020-05-30 RX ORDER — PANTOPRAZOLE SODIUM 20 MG/1
40 TABLET, DELAYED RELEASE ORAL
Refills: 0 | Status: DISCONTINUED | OUTPATIENT
Start: 2020-05-30 | End: 2020-06-02

## 2020-05-30 RX ORDER — SODIUM CHLORIDE 9 MG/ML
500 INJECTION, SOLUTION INTRAVENOUS
Refills: 0 | Status: DISCONTINUED | OUTPATIENT
Start: 2020-05-30 | End: 2020-06-02

## 2020-05-30 RX ORDER — OXYCODONE HYDROCHLORIDE 5 MG/1
5 TABLET ORAL EVERY 4 HOURS
Refills: 0 | Status: DISCONTINUED | OUTPATIENT
Start: 2020-05-30 | End: 2020-06-01

## 2020-05-30 RX ORDER — METOCLOPRAMIDE HCL 10 MG
10 TABLET ORAL ONCE
Refills: 0 | Status: DISCONTINUED | OUTPATIENT
Start: 2020-05-30 | End: 2020-05-30

## 2020-05-30 RX ORDER — OXYCODONE HYDROCHLORIDE 5 MG/1
10 TABLET ORAL EVERY 4 HOURS
Refills: 0 | Status: DISCONTINUED | OUTPATIENT
Start: 2020-05-30 | End: 2020-06-01

## 2020-05-30 RX ORDER — ONDANSETRON 8 MG/1
4 TABLET, FILM COATED ORAL ONCE
Refills: 0 | Status: DISCONTINUED | OUTPATIENT
Start: 2020-05-30 | End: 2020-05-30

## 2020-05-30 RX ORDER — FENTANYL CITRATE 50 UG/ML
25 INJECTION INTRAVENOUS
Refills: 0 | Status: DISCONTINUED | OUTPATIENT
Start: 2020-05-30 | End: 2020-05-30

## 2020-05-30 RX ORDER — LAMOTRIGINE 25 MG/1
200 TABLET, ORALLY DISINTEGRATING ORAL DAILY
Refills: 0 | Status: DISCONTINUED | OUTPATIENT
Start: 2020-05-30 | End: 2020-06-02

## 2020-05-30 RX ORDER — OLANZAPINE 15 MG/1
10 TABLET, FILM COATED ORAL AT BEDTIME
Refills: 0 | Status: DISCONTINUED | OUTPATIENT
Start: 2020-05-30 | End: 2020-06-02

## 2020-05-30 RX ORDER — SENNA PLUS 8.6 MG/1
2 TABLET ORAL AT BEDTIME
Refills: 0 | Status: DISCONTINUED | OUTPATIENT
Start: 2020-05-30 | End: 2020-06-02

## 2020-05-30 RX ORDER — LIDOCAINE HCL 20 MG/ML
0.2 VIAL (ML) INJECTION ONCE
Refills: 0 | Status: DISCONTINUED | OUTPATIENT
Start: 2020-05-30 | End: 2020-05-30

## 2020-05-30 RX ORDER — SODIUM CHLORIDE 9 MG/ML
1000 INJECTION, SOLUTION INTRAVENOUS ONCE
Refills: 0 | Status: COMPLETED | OUTPATIENT
Start: 2020-05-30 | End: 2020-05-30

## 2020-05-30 RX ORDER — ACETAMINOPHEN 500 MG
1000 TABLET ORAL ONCE
Refills: 0 | Status: DISCONTINUED | OUTPATIENT
Start: 2020-05-30 | End: 2020-06-02

## 2020-05-30 RX ORDER — TICAGRELOR 90 MG/1
90 TABLET ORAL
Refills: 0 | Status: DISCONTINUED | OUTPATIENT
Start: 2020-05-31 | End: 2020-06-02

## 2020-05-30 RX ORDER — ACETAMINOPHEN 500 MG
1000 TABLET ORAL EVERY 8 HOURS
Refills: 0 | Status: DISCONTINUED | OUTPATIENT
Start: 2020-05-30 | End: 2020-06-02

## 2020-05-30 RX ORDER — POLYETHYLENE GLYCOL 3350 17 G/17G
17 POWDER, FOR SOLUTION ORAL DAILY
Refills: 0 | Status: DISCONTINUED | OUTPATIENT
Start: 2020-05-30 | End: 2020-06-02

## 2020-05-30 RX ORDER — CARVEDILOL PHOSPHATE 80 MG/1
3.12 CAPSULE, EXTENDED RELEASE ORAL EVERY 12 HOURS
Refills: 0 | Status: DISCONTINUED | OUTPATIENT
Start: 2020-05-31 | End: 2020-06-02

## 2020-05-30 RX ORDER — ONDANSETRON 8 MG/1
4 TABLET, FILM COATED ORAL EVERY 6 HOURS
Refills: 0 | Status: DISCONTINUED | OUTPATIENT
Start: 2020-05-30 | End: 2020-06-02

## 2020-05-30 RX ORDER — ASPIRIN/CALCIUM CARB/MAGNESIUM 324 MG
325 TABLET ORAL ONCE
Refills: 0 | Status: COMPLETED | OUTPATIENT
Start: 2020-05-30 | End: 2020-05-31

## 2020-05-30 RX ORDER — CELECOXIB 200 MG/1
200 CAPSULE ORAL ONCE
Refills: 0 | Status: COMPLETED | OUTPATIENT
Start: 2020-05-30 | End: 2020-05-30

## 2020-05-30 RX ORDER — SIMVASTATIN 20 MG/1
20 TABLET, FILM COATED ORAL AT BEDTIME
Refills: 0 | Status: DISCONTINUED | OUTPATIENT
Start: 2020-05-30 | End: 2020-06-02

## 2020-05-30 RX ORDER — LAMOTRIGINE 25 MG/1
100 TABLET, ORALLY DISINTEGRATING ORAL AT BEDTIME
Refills: 0 | Status: DISCONTINUED | OUTPATIENT
Start: 2020-05-30 | End: 2020-06-02

## 2020-05-30 RX ORDER — SODIUM CHLORIDE 9 MG/ML
3 INJECTION INTRAMUSCULAR; INTRAVENOUS; SUBCUTANEOUS EVERY 8 HOURS
Refills: 0 | Status: DISCONTINUED | OUTPATIENT
Start: 2020-05-30 | End: 2020-05-30

## 2020-05-30 RX ORDER — ACETAMINOPHEN 500 MG
975 TABLET ORAL EVERY 6 HOURS
Refills: 0 | Status: COMPLETED | OUTPATIENT
Start: 2020-05-30 | End: 2020-06-02

## 2020-05-30 RX ORDER — TRAMADOL HYDROCHLORIDE 50 MG/1
50 TABLET ORAL EVERY 8 HOURS
Refills: 0 | Status: DISCONTINUED | OUTPATIENT
Start: 2020-05-30 | End: 2020-06-01

## 2020-05-30 RX ORDER — ASPIRIN/CALCIUM CARB/MAGNESIUM 324 MG
1 TABLET ORAL
Qty: 0 | Refills: 0 | DISCHARGE

## 2020-05-30 RX ORDER — GABAPENTIN 400 MG/1
300 CAPSULE ORAL ONCE
Refills: 0 | Status: COMPLETED | OUTPATIENT
Start: 2020-05-30 | End: 2020-05-30

## 2020-05-30 RX ADMIN — OLANZAPINE 10 MILLIGRAM(S): 15 TABLET, FILM COATED ORAL at 22:33

## 2020-05-30 RX ADMIN — SODIUM CHLORIDE 150 MILLILITER(S): 9 INJECTION, SOLUTION INTRAVENOUS at 13:19

## 2020-05-30 RX ADMIN — POLYETHYLENE GLYCOL 3350 17 GRAM(S): 17 POWDER, FOR SOLUTION ORAL at 17:21

## 2020-05-30 RX ADMIN — Medication 15 MILLIGRAM(S): at 12:52

## 2020-05-30 RX ADMIN — Medication 975 MILLIGRAM(S): at 17:22

## 2020-05-30 RX ADMIN — LAMOTRIGINE 100 MILLIGRAM(S): 25 TABLET, ORALLY DISINTEGRATING ORAL at 22:33

## 2020-05-30 RX ADMIN — GABAPENTIN 300 MILLIGRAM(S): 400 CAPSULE ORAL at 08:52

## 2020-05-30 RX ADMIN — Medication 100 MILLIGRAM(S): at 17:22

## 2020-05-30 RX ADMIN — SIMVASTATIN 20 MILLIGRAM(S): 20 TABLET, FILM COATED ORAL at 22:33

## 2020-05-30 RX ADMIN — SODIUM CHLORIDE 1000 MILLILITER(S): 9 INJECTION, SOLUTION INTRAVENOUS at 17:21

## 2020-05-30 RX ADMIN — HYDROMORPHONE HYDROCHLORIDE 0.5 MILLIGRAM(S): 2 INJECTION INTRAMUSCULAR; INTRAVENOUS; SUBCUTANEOUS at 13:07

## 2020-05-30 RX ADMIN — SODIUM CHLORIDE 1000 MILLILITER(S): 9 INJECTION, SOLUTION INTRAVENOUS at 12:16

## 2020-05-30 RX ADMIN — HYDROMORPHONE HYDROCHLORIDE 0.5 MILLIGRAM(S): 2 INJECTION INTRAMUSCULAR; INTRAVENOUS; SUBCUTANEOUS at 12:09

## 2020-05-30 RX ADMIN — SENNA PLUS 2 TABLET(S): 8.6 TABLET ORAL at 22:34

## 2020-05-30 RX ADMIN — CELECOXIB 200 MILLIGRAM(S): 200 CAPSULE ORAL at 08:52

## 2020-05-30 RX ADMIN — TRAMADOL HYDROCHLORIDE 50 MILLIGRAM(S): 50 TABLET ORAL at 22:34

## 2020-05-30 RX ADMIN — OXYCODONE HYDROCHLORIDE 5 MILLIGRAM(S): 5 TABLET ORAL at 12:42

## 2020-05-30 NOTE — PHYSICAL THERAPY INITIAL EVALUATION ADULT - PLANNED THERAPY INTERVENTIONS, PT EVAL
gait training/strengthening/balance training/Pt will negotiate 1 flight of stairs indepenedently in 2 weeks./bed mobility training

## 2020-05-30 NOTE — DISCHARGE NOTE PROVIDER - HOSPITAL COURSE
H&P:    Pt is a 69y Female  PAST MEDICAL & SURGICAL HISTORY:    Cloudy urine    Gait disturbance    Anxiousness: concern re surgery    Weight loss: attempting to loss weight states so far has lost about 10 pounds    Cancer of skin: multiple locations several have been removed and or laser      face (forehead), nose, arms    Benign hypertension    H/O gastroesophageal reflux (GERD)    COPD (chronic obstructive pulmonary disease)    Myocardial infarct, old: 2016    Unilateral primary osteoarthritis, left hip    CAD (coronary artery disease)    Bipolar disorder: oral medication    S/P tonsillectomy: childhood    S/P cardiac catheterization: x 4             Now s/p Total Hip Arthroplasty. Pt is afebrile with stable vital signs. Pain is controlled. Alert and Oriented. Exam reveals intact EHL FHL TA GS, +DP. Dressing is clean and dry with a New Aquacel bandage on.        Hospital Course:    Patient presented to [facility] medically cleared for elective Hip Replacement Surgery, having failed outpatient conservative management. Prophylactic antibiotics were started before the procedure and continued for 24 hours. They were admitted after surgery to the orthopedic floor.   There were no complications during the hospital stay. All home medications were continued.         Routine consults were obtained from Physical Therapy for PT. Patient was placed on anticoagulation with Aspirin 81 mg.  Pertinent home medications were continued.  Daily labs were followed.          POD 0 pt was stable overnight.  Pt received PT twice daily, and a new Aquacel dressing was applied prior to discharge.  The orthopedic Attending is aware and agrees. H&P:    Pt is a 69y Female  PAST MEDICAL & SURGICAL HISTORY:    Cloudy urine    Gait disturbance    Anxiousness: concern re surgery    Weight loss: attempting to loss weight states so far has lost about 10 pounds    Cancer of skin: multiple locations several have been removed and or laser      face (forehead), nose, arms    Benign hypertension    H/O gastroesophageal reflux (GERD)    COPD (chronic obstructive pulmonary disease)    Myocardial infarct, old: 2016    Unilateral primary osteoarthritis, left hip    CAD (coronary artery disease)    Bipolar disorder: oral medication    S/P tonsillectomy: childhood    S/P cardiac catheterization: x 4             Now s/p Total Hip Arthroplasty. Pt is afebrile with stable vital signs. Pain is controlled. Alert and Oriented. Exam reveals intact EHL FHL TA GS, +DP. Dressing is clean and dry with a New Aquacel bandage on.        Hospital Course:    Patient presented to [facility] medically cleared for elective Hip Replacement Surgery, having failed outpatient conservative management. Prophylactic antibiotics were started before the procedure and continued for 24 hours. They were admitted after surgery to the orthopedic floor.   There were no complications during the hospital stay. All home medications were continued.         Routine consults were obtained from Physical Therapy for PT. Patient was placed on anticoagulation with Aspirin 325mg and Brilinta home medication was continued. Pertinent home medications were continued.  Daily labs were followed.          POD 0 pt was stable overnight.  Pt received PT twice daily, and a new Aquacel dressing was applied prior to discharge.  The orthopedic Attending is aware and agrees. H&P:    Pt is a 69y Female  PAST MEDICAL & SURGICAL HISTORY:    Cloudy urine    Gait disturbance    Anxiousness: concern re surgery    Weight loss: attempting to loss weight states so far has lost about 10 pounds    Cancer of skin: multiple locations several have been removed and or laser      face (forehead), nose, arms    Benign hypertension    H/O gastroesophageal reflux (GERD)    COPD (chronic obstructive pulmonary disease)    Myocardial infarct, old: 2016    Unilateral primary osteoarthritis, left hip    CAD (coronary artery disease)    Bipolar disorder: oral medication    S/P tonsillectomy: childhood    S/P cardiac catheterization: x 4            Hospital Course:    Patient presented to [facility] medically cleared for elective L total hip replacement with Dr. Frausto, having failed outpatient conservative management. Patient tolerated procedure well. PT evaluated patient postoperatively and recommended subacute rehab for discharge. Rest of hospital course unremarkable and patient discharged when bed available.

## 2020-05-30 NOTE — PHYSICAL THERAPY INITIAL EVALUATION ADULT - RANGE OF MOTION EXAMINATION, REHAB EVAL
Right LE ROM was WFL (within functional limits)/LLE not tested 2/2 Sx/bilateral upper extremity ROM was WFL (within functional limits)

## 2020-05-30 NOTE — PHYSICAL THERAPY INITIAL EVALUATION ADULT - CRITERIA FOR SKILLED THERAPEUTIC INTERVENTIONS
functional limitations in following categories/predicted duration of therapy intervention/anticipated equipment needs at discharge/risk reduction/prevention/rehab potential/impairments found/therapy frequency/anticipated discharge recommendation

## 2020-05-30 NOTE — PHYSICAL THERAPY INITIAL EVALUATION ADULT - ADDITIONAL COMMENTS
as per pt: Pt lives in a 2nd floor cottage where she has to negotiate approx 8 to the landing and then 17 steps to living floor + hand rail. Pt states she lives alone.

## 2020-05-30 NOTE — DISCHARGE NOTE PROVIDER - NSDCMRMEDTOKEN_GEN_ALL_CORE_FT
Advil 200 mg oral tablet: 1 tab(s) orally prn  Aspir 81 oral delayed release tablet: 1 tab(s) orally once a day  Augmentin 875 mg-125 mg oral tablet: 1 tab(s) orally every 12 hours  Brilinta (ticagrelor) 90 mg oral tablet: 1 tab(s) orally 2 times a day  CeleBREX 100 mg oral capsule: 1 cap(s) orally 2 times a day  Coreg 3.125 mg oral tablet: 1 tab(s) orally 2 times a day  Cozaar 25 mg oral tablet: 1 tab(s) orally once a day  LaMICtal 100 mg oral tablet: 1 tab(s) orally once a day (at bedtime)  LaMICtal  mg oral tablet, extended release: 1 tab(s) orally once a day  mupirocin 2% topical ointment: Apply topically to affected area 3 times a day  nitroglycerin 0.4 mg sublingual tablet: 1 tab(s) sublingual every 5 minutes, As Needed  Protonix 40 mg oral delayed release tablet: 1 tab(s) orally once a day  Vitamin D3 50,000 intl units (1250 mcg) oral capsule: 1 cap(s) orally once a week  Zocor 20 mg oral tablet: 1 tab(s) orally once a day (at bedtime)  ZyPREXA 10 mg oral tablet: 1 tab(s) orally once a day (at bedtime) Augmentin 875 mg-125 mg oral tablet: 1 tab(s) orally every 12 hours  Brilinta (ticagrelor) 90 mg oral tablet: 1 tab(s) orally 2 times a day  Coreg 3.125 mg oral tablet: 1 tab(s) orally 2 times a day  Cozaar 25 mg oral tablet: 1 tab(s) orally once a day  Ecotrin 325 mg oral delayed release tablet: 1 tab(s) orally 2 times a day   LaMICtal 100 mg oral tablet: 1 tab(s) orally once a day (at bedtime)  LaMICtal  mg oral tablet, extended release: 1 tab(s) orally once a day  mupirocin 2% topical ointment: Apply topically to affected area 3 times a day  nitroglycerin 0.4 mg sublingual tablet: 1 tab(s) sublingual every 5 minutes, As Needed  oxycodone-acetaminophen 5 mg-325 mg oral tablet: 1 tab(s) orally every 4 hours, As Needed -for severe pain MDD:6   Protonix 40 mg oral delayed release tablet: 1 tab(s) orally once a day  Vitamin D3 50,000 intl units (1250 mcg) oral capsule: 1 cap(s) orally once a week  Zocor 20 mg oral tablet: 1 tab(s) orally once a day (at bedtime)  ZyPREXA 10 mg oral tablet: 1 tab(s) orally once a day (at bedtime) aspirin 325 mg oral delayed release tablet: 1 tab(s) orally 2 times a day  Augmentin 875 mg-125 mg oral tablet: 1 tab(s) orally every 12 hours  Brilinta (ticagrelor) 90 mg oral tablet: 1 tab(s) orally 2 times a day  Coreg 3.125 mg oral tablet: 1 tab(s) orally 2 times a day  Cozaar 25 mg oral tablet: 1 tab(s) orally once a day  Ecotrin 325 mg oral delayed release tablet: 1 tab(s) orally 2 times a day   LaMICtal 100 mg oral tablet: 1 tab(s) orally once a day (at bedtime)  LaMICtal  mg oral tablet, extended release: 1 tab(s) orally once a day  mupirocin 2% topical ointment: Apply topically to affected area 3 times a day  nitroglycerin 0.4 mg sublingual tablet: 1 tab(s) sublingual every 5 minutes, As Needed  Protonix 40 mg oral delayed release tablet: 1 tab(s) orally once a day  traMADol 50 mg oral tablet: 1 tab(s) orally every 6 hours, As needed, Severe Pain (7 - 10)  Vitamin D3 50,000 intl units (1250 mcg) oral capsule: 1 cap(s) orally once a week  Zocor 20 mg oral tablet: 1 tab(s) orally once a day (at bedtime)  ZyPREXA 10 mg oral tablet: 1 tab(s) orally once a day (at bedtime)

## 2020-05-30 NOTE — PHYSICAL THERAPY INITIAL EVALUATION ADULT - ACTIVE RANGE OF MOTION EXAMINATION, REHAB EVAL
bilateral upper extremity Active ROM was WFL (within functional limits)/LLE not tested 2/2 Sx/Right LE Active ROM was WFL (within functional limits)

## 2020-05-30 NOTE — DISCHARGE NOTE PROVIDER - NSDCCPCAREPLAN_GEN_ALL_CORE_FT
PRINCIPAL DISCHARGE DIAGNOSIS  Diagnosis: Unilateral primary osteoarthritis, left hip  Assessment and Plan of Treatment: -	Pain Control  -	Walking with full weight bearing as tolerated, with assistive devices (walker/Cane as Needed)  -	DVT Prophylaxis- Aspirin 81mg PO BID x 30 days  -	Follow up with Dr. Frausto as Outpatient in 30 Days after Discharge from the Hospital. Call Office For Appointment.   -	Ice affected area as Needed  -	Keep Dressing Clean and dry. PRINCIPAL DISCHARGE DIAGNOSIS  Diagnosis: Unilateral primary osteoarthritis, left hip  Assessment and Plan of Treatment: -	Pain Control  -	Walking with full weight bearing as tolerated, with assistive devices (walker/Cane as Needed)  -	DVT Prophylaxis- Aspirin 81mg PO BID x 30 days  -	Follow up with Dr. Frausto as Outpatient in 14 Days after Discharge from the Hospital. Call Office For Appointment.   -	Ice affected area as Needed  -	Keep Dressing Clean and dry. PRINCIPAL DISCHARGE DIAGNOSIS  Diagnosis: Unilateral primary osteoarthritis, left hip  Assessment and Plan of Treatment:

## 2020-05-30 NOTE — DISCHARGE NOTE PROVIDER - NSDCFUADDINST_GEN_ALL_CORE_FT
Full weight bearing as tolerated LLE, with assistive devices (walker/cane as Needed)  -DVT Prophylaxis- Aspirin 81mg PO BID x 30 days  -Follow up with Dr. Frausto's office 14 days after discharge from the hospital. Call office for appointment.   -Ice affected area as Needed  -Keep dressing clean and dry.

## 2020-05-30 NOTE — PROGRESS NOTE ADULT - SUBJECTIVE AND OBJECTIVE BOX
Orthopedics Post-op Check  POD 0  Patient seen and examined at bedside. Pain is controlled. Pt feeling well. No nausea or vomiting.    Vital Signs Last 24 Hrs  T(C): 36.3 (05-30-20 @ 16:49), Max: 36.6 (05-30-20 @ 08:36)  T(F): 97.3 (05-30-20 @ 16:49), Max: 97.9 (05-30-20 @ 08:36)  HR: 75 (05-30-20 @ 16:49) (72 - 88)  BP: 102/59 (05-30-20 @ 16:49) (88/54 - 117/76)  BP(mean): 76 (05-30-20 @ 15:00) (65 - 80)  RR: 16 (05-30-20 @ 16:49) (12 - 22)  SpO2: 98% (05-30-20 @ 16:49) (90% - 100%)              Exam:  Gen: NAD, resting comfortably  LLE  Dressing c/d/i  +EHL/FHL/TA/GS  SILT L2-S1  +DP/PT 2+  Calf NTTP b/l  Compartments soft and compressible    A/P:  69yFemale Stable POD 0  s/p L AYLA    -FU labs  -WBAT LLE  -Pain control  -PT/OT  -Ppx ABX  -DVT PE ppx  -Incentive spirometry

## 2020-05-30 NOTE — PHYSICAL THERAPY INITIAL EVALUATION ADULT - PERTINENT HX OF CURRENT PROBLEM, REHAB EVAL
69 year old female with hx of CAD, Bipolar Disease, Osteoarthritis of hip. Has been having increase pain left hip when walking. now s/p Total hip 5/30. anterior approach

## 2020-05-30 NOTE — DISCHARGE NOTE PROVIDER - CARE PROVIDER_API CALL
Michael Frausto  ORTHOPAEDIC SURGERY  [address]    [address]  [place] [address]  Phone: [phone]  Fax: [phone]  Follow Up Time:

## 2020-05-31 LAB
ANION GAP SERPL CALC-SCNC: 10 MMOL/L — SIGNIFICANT CHANGE UP (ref 5–17)
BUN SERPL-MCNC: 13 MG/DL — SIGNIFICANT CHANGE UP (ref 7–23)
CALCIUM SERPL-MCNC: 8.1 MG/DL — LOW (ref 8.4–10.5)
CHLORIDE SERPL-SCNC: 108 MMOL/L — SIGNIFICANT CHANGE UP (ref 96–108)
CO2 SERPL-SCNC: 24 MMOL/L — SIGNIFICANT CHANGE UP (ref 22–31)
CREAT SERPL-MCNC: 0.53 MG/DL — SIGNIFICANT CHANGE UP (ref 0.5–1.3)
GLUCOSE SERPL-MCNC: 137 MG/DL — HIGH (ref 70–99)
HCT VFR BLD CALC: 29.9 % — LOW (ref 34.5–45)
HGB BLD-MCNC: 9.8 G/DL — LOW (ref 11.5–15.5)
MCHC RBC-ENTMCNC: 29 PG — SIGNIFICANT CHANGE UP (ref 27–34)
MCHC RBC-ENTMCNC: 32.8 GM/DL — SIGNIFICANT CHANGE UP (ref 32–36)
MCV RBC AUTO: 88.5 FL — SIGNIFICANT CHANGE UP (ref 80–100)
NRBC # BLD: 0 /100 WBCS — SIGNIFICANT CHANGE UP (ref 0–0)
PLATELET # BLD AUTO: 186 K/UL — SIGNIFICANT CHANGE UP (ref 150–400)
POTASSIUM SERPL-MCNC: 4.1 MMOL/L — SIGNIFICANT CHANGE UP (ref 3.5–5.3)
POTASSIUM SERPL-SCNC: 4.1 MMOL/L — SIGNIFICANT CHANGE UP (ref 3.5–5.3)
RBC # BLD: 3.38 M/UL — LOW (ref 3.8–5.2)
RBC # FLD: 13 % — SIGNIFICANT CHANGE UP (ref 10.3–14.5)
SODIUM SERPL-SCNC: 142 MMOL/L — SIGNIFICANT CHANGE UP (ref 135–145)
WBC # BLD: 11.53 K/UL — HIGH (ref 3.8–10.5)
WBC # FLD AUTO: 11.53 K/UL — HIGH (ref 3.8–10.5)

## 2020-05-31 RX ORDER — LANOLIN ALCOHOL/MO/W.PET/CERES
3 CREAM (GRAM) TOPICAL AT BEDTIME
Refills: 0 | Status: DISCONTINUED | OUTPATIENT
Start: 2020-05-31 | End: 2020-06-02

## 2020-05-31 RX ORDER — SODIUM CHLORIDE 9 MG/ML
500 INJECTION INTRAMUSCULAR; INTRAVENOUS; SUBCUTANEOUS ONCE
Refills: 0 | Status: COMPLETED | OUTPATIENT
Start: 2020-05-31 | End: 2020-05-31

## 2020-05-31 RX ORDER — ASPIRIN/CALCIUM CARB/MAGNESIUM 324 MG
325 TABLET ORAL
Refills: 0 | Status: DISCONTINUED | OUTPATIENT
Start: 2020-05-31 | End: 2020-06-02

## 2020-05-31 RX ADMIN — SODIUM CHLORIDE 500 MILLILITER(S): 9 INJECTION INTRAMUSCULAR; INTRAVENOUS; SUBCUTANEOUS at 17:31

## 2020-05-31 RX ADMIN — TRAMADOL HYDROCHLORIDE 50 MILLIGRAM(S): 50 TABLET ORAL at 23:11

## 2020-05-31 RX ADMIN — Medication 975 MILLIGRAM(S): at 17:31

## 2020-05-31 RX ADMIN — Medication 3 MILLIGRAM(S): at 02:30

## 2020-05-31 RX ADMIN — TICAGRELOR 90 MILLIGRAM(S): 90 TABLET ORAL at 17:30

## 2020-05-31 RX ADMIN — TRAMADOL HYDROCHLORIDE 50 MILLIGRAM(S): 50 TABLET ORAL at 15:03

## 2020-05-31 RX ADMIN — Medication 975 MILLIGRAM(S): at 11:06

## 2020-05-31 RX ADMIN — Medication 975 MILLIGRAM(S): at 23:11

## 2020-05-31 RX ADMIN — SIMVASTATIN 20 MILLIGRAM(S): 20 TABLET, FILM COATED ORAL at 23:10

## 2020-05-31 RX ADMIN — LAMOTRIGINE 100 MILLIGRAM(S): 25 TABLET, ORALLY DISINTEGRATING ORAL at 23:10

## 2020-05-31 RX ADMIN — Medication 100 MILLIGRAM(S): at 02:31

## 2020-05-31 RX ADMIN — LAMOTRIGINE 200 MILLIGRAM(S): 25 TABLET, ORALLY DISINTEGRATING ORAL at 11:07

## 2020-05-31 RX ADMIN — TRAMADOL HYDROCHLORIDE 50 MILLIGRAM(S): 50 TABLET ORAL at 05:48

## 2020-05-31 RX ADMIN — CELECOXIB 200 MILLIGRAM(S): 200 CAPSULE ORAL at 17:32

## 2020-05-31 RX ADMIN — Medication 30 MILLILITER(S): at 17:39

## 2020-05-31 RX ADMIN — POLYETHYLENE GLYCOL 3350 17 GRAM(S): 17 POWDER, FOR SOLUTION ORAL at 11:07

## 2020-05-31 RX ADMIN — Medication 3 MILLIGRAM(S): at 23:10

## 2020-05-31 RX ADMIN — Medication 325 MILLIGRAM(S): at 17:51

## 2020-05-31 RX ADMIN — Medication 975 MILLIGRAM(S): at 05:49

## 2020-05-31 RX ADMIN — SODIUM CHLORIDE 150 MILLILITER(S): 9 INJECTION, SOLUTION INTRAVENOUS at 08:15

## 2020-05-31 RX ADMIN — PANTOPRAZOLE SODIUM 40 MILLIGRAM(S): 20 TABLET, DELAYED RELEASE ORAL at 05:49

## 2020-05-31 RX ADMIN — TICAGRELOR 90 MILLIGRAM(S): 90 TABLET ORAL at 05:48

## 2020-05-31 RX ADMIN — SODIUM CHLORIDE 1000 MILLILITER(S): 9 INJECTION, SOLUTION INTRAVENOUS at 05:50

## 2020-05-31 RX ADMIN — OLANZAPINE 10 MILLIGRAM(S): 15 TABLET, FILM COATED ORAL at 23:11

## 2020-05-31 NOTE — PROGRESS NOTE ADULT - SUBJECTIVE AND OBJECTIVE BOX
POD 1    Patient seen and examined at bedside this am. Pain is controlled. Pt feeling well. No nausea or vomiting. Pt getting up to go to restroom with walker.         Vital Signs Last 24 Hrs  T(C): 36.3 (31 May 2020 05:11), Max: 36.8 (31 May 2020 00:41)  T(F): 97.4 (31 May 2020 05:11), Max: 98.3 (31 May 2020 00:41)  HR: 72 (31 May 2020 05:11) (70 - 88)  BP: 100/65 (31 May 2020 05:11) (88/54 - 117/76)  BP(mean): 76 (30 May 2020 15:00) (65 - 80)  RR: 17 (31 May 2020 05:11) (12 - 22)  SpO2: 94% (31 May 2020 05:11) (90% - 100%)      PE:       Gen: NAD, resting comfortably  LLE  Dressing c/d/i  +EHL/FHL/TA/GS  SILT L2-S1  +DP/PT 2+  Calf NTTP b/l  Compartments soft and compressible

## 2020-05-31 NOTE — OCCUPATIONAL THERAPY INITIAL EVALUATION ADULT - PLANNED THERAPY INTERVENTIONS, OT EVAL
balance training/parent/caregiver training.../transfer training/ADL retraining/bed mobility training

## 2020-05-31 NOTE — PROGRESS NOTE ADULT - ASSESSMENT
A/P:  69yFemale Stable POD 1 s/p L AYLA      Pt doing well this am, antonina soft BPs this am  -Trend vitals  -FU labs  -WBAT LLE  -Pain control  -PT/OT  -Ppx ABX  -DVT PE ppx  -Incentive spirometry  -DC planning, Home 5/31 v 6/1  -will d/w attending and advise if plan changes

## 2020-05-31 NOTE — OCCUPATIONAL THERAPY INITIAL EVALUATION ADULT - PERTINENT HX OF CURRENT PROBLEM, REHAB EVAL
69 year old female with hx of CAD, Bipolar Disease, Osteoarthritis of hip. Has been having increase pain left hip when walking. Pt is now s/p L Total hip 5/30, anterior approach.

## 2020-06-01 LAB — SARS-COV-2 RNA SPEC QL NAA+PROBE: SIGNIFICANT CHANGE UP

## 2020-06-01 RX ORDER — TRAMADOL HYDROCHLORIDE 50 MG/1
25 TABLET ORAL EVERY 6 HOURS
Refills: 0 | Status: DISCONTINUED | OUTPATIENT
Start: 2020-06-01 | End: 2020-06-02

## 2020-06-01 RX ORDER — TRAMADOL HYDROCHLORIDE 50 MG/1
50 TABLET ORAL EVERY 6 HOURS
Refills: 0 | Status: DISCONTINUED | OUTPATIENT
Start: 2020-06-01 | End: 2020-06-02

## 2020-06-01 RX ADMIN — CARVEDILOL PHOSPHATE 3.12 MILLIGRAM(S): 80 CAPSULE, EXTENDED RELEASE ORAL at 20:55

## 2020-06-01 RX ADMIN — LAMOTRIGINE 200 MILLIGRAM(S): 25 TABLET, ORALLY DISINTEGRATING ORAL at 13:47

## 2020-06-01 RX ADMIN — Medication 3 MILLIGRAM(S): at 20:55

## 2020-06-01 RX ADMIN — SIMVASTATIN 20 MILLIGRAM(S): 20 TABLET, FILM COATED ORAL at 20:55

## 2020-06-01 RX ADMIN — TICAGRELOR 90 MILLIGRAM(S): 90 TABLET ORAL at 17:09

## 2020-06-01 RX ADMIN — CARVEDILOL PHOSPHATE 3.12 MILLIGRAM(S): 80 CAPSULE, EXTENDED RELEASE ORAL at 10:15

## 2020-06-01 RX ADMIN — CELECOXIB 200 MILLIGRAM(S): 200 CAPSULE ORAL at 17:08

## 2020-06-01 RX ADMIN — PANTOPRAZOLE SODIUM 40 MILLIGRAM(S): 20 TABLET, DELAYED RELEASE ORAL at 05:06

## 2020-06-01 RX ADMIN — OLANZAPINE 10 MILLIGRAM(S): 15 TABLET, FILM COATED ORAL at 17:09

## 2020-06-01 RX ADMIN — Medication 975 MILLIGRAM(S): at 04:59

## 2020-06-01 RX ADMIN — OXYCODONE HYDROCHLORIDE 5 MILLIGRAM(S): 5 TABLET ORAL at 10:14

## 2020-06-01 RX ADMIN — TICAGRELOR 90 MILLIGRAM(S): 90 TABLET ORAL at 05:00

## 2020-06-01 RX ADMIN — Medication 325 MILLIGRAM(S): at 05:11

## 2020-06-01 RX ADMIN — POLYETHYLENE GLYCOL 3350 17 GRAM(S): 17 POWDER, FOR SOLUTION ORAL at 13:47

## 2020-06-01 RX ADMIN — Medication 325 MILLIGRAM(S): at 17:08

## 2020-06-01 RX ADMIN — OXYCODONE HYDROCHLORIDE 5 MILLIGRAM(S): 5 TABLET ORAL at 17:08

## 2020-06-01 RX ADMIN — CELECOXIB 200 MILLIGRAM(S): 200 CAPSULE ORAL at 05:00

## 2020-06-01 NOTE — PROGRESS NOTE ADULT - SUBJECTIVE AND OBJECTIVE BOX
Pt seen/examined. Doing well. Pain controlled. Received one bolus during the day yesterday. No acute overnight complaints or events.    T(C): 36.4 (05-31-20 @ 21:04), Max: 36.8 (05-31-20 @ 00:41)  HR: 77 (05-31-20 @ 21:04) (72 - 86)  BP: 111/71 (05-31-20 @ 21:04) (86/54 - 118/74)  RR: 18 (05-31-20 @ 21:04) (17 - 18)  SpO2: 95% (05-31-20 @ 21:04) (93% - 95%)  Wt(kg): --    Gen: awake, alert, NAD  Resp: no increased work of breathing  LLE:  Dressing c/d/i   +EHL/FHL/TA/GS  SILT S/S/SP/DP  +DP/PT Pulses  Compartments soft  No calf TTP     69yFemale s/p left AYLA POD 2    - Pain control  - mechanical/DVT ppx  - OOB/PT  - WBAT LLE  - Ice and elevate LLE  - Dispo planning  - Will d/w attending and advise if plan changes Pt seen/examined. Doing well. Pain controlled. Received one bolus during the day yesterday. No acute overnight complaints or events.    T(C): 36.4 (05-31-20 @ 21:04), Max: 36.8 (05-31-20 @ 00:41)  HR: 77 (05-31-20 @ 21:04) (72 - 86)  BP: 111/71 (05-31-20 @ 21:04) (86/54 - 118/74)  RR: 18 (05-31-20 @ 21:04) (17 - 18)  SpO2: 95% (05-31-20 @ 21:04) (93% - 95%)  Wt(kg): --                          9.8    11.53 )-----------( 186      ( 31 May 2020 06:40 )             29.9       05-31    142  |  108  |  13  ----------------------------<  137<H>  4.1   |  24  |  0.53            Gen: awake, alert, NAD  Resp: no increased work of breathing  LLE:  Dressing c/d/i   +EHL/FHL/TA/GS  SILT S/S/SP/DP  +DP/PT Pulses  Compartments soft  No calf TTP     69yFemale s/p left AYLA POD 2    - Pain control  - mechanical/DVT ppx  - OOB/PT  - WBAT LLE  - Ice and elevate LLE  - Dispo planning  - Will d/w attending and advise if plan changes

## 2020-06-02 ENCOUNTER — TRANSCRIPTION ENCOUNTER (OUTPATIENT)
Age: 70
End: 2020-06-02

## 2020-06-02 VITALS
DIASTOLIC BLOOD PRESSURE: 91 MMHG | OXYGEN SATURATION: 93 % | HEART RATE: 82 BPM | SYSTOLIC BLOOD PRESSURE: 138 MMHG | RESPIRATION RATE: 18 BRPM | TEMPERATURE: 97 F

## 2020-06-02 PROCEDURE — 99222 1ST HOSP IP/OBS MODERATE 55: CPT

## 2020-06-02 PROCEDURE — 72170 X-RAY EXAM OF PELVIS: CPT

## 2020-06-02 PROCEDURE — C1776: CPT

## 2020-06-02 PROCEDURE — 97116 GAIT TRAINING THERAPY: CPT

## 2020-06-02 PROCEDURE — 76000 FLUOROSCOPY <1 HR PHYS/QHP: CPT

## 2020-06-02 PROCEDURE — 97535 SELF CARE MNGMENT TRAINING: CPT

## 2020-06-02 PROCEDURE — 97110 THERAPEUTIC EXERCISES: CPT

## 2020-06-02 PROCEDURE — 82962 GLUCOSE BLOOD TEST: CPT

## 2020-06-02 PROCEDURE — 97530 THERAPEUTIC ACTIVITIES: CPT

## 2020-06-02 PROCEDURE — 97165 OT EVAL LOW COMPLEX 30 MIN: CPT

## 2020-06-02 PROCEDURE — 80048 BASIC METABOLIC PNL TOTAL CA: CPT

## 2020-06-02 PROCEDURE — 85027 COMPLETE CBC AUTOMATED: CPT

## 2020-06-02 PROCEDURE — U0003: CPT

## 2020-06-02 PROCEDURE — 97162 PT EVAL MOD COMPLEX 30 MIN: CPT

## 2020-06-02 RX ORDER — ASPIRIN/CALCIUM CARB/MAGNESIUM 324 MG
1 TABLET ORAL
Qty: 0 | Refills: 0 | DISCHARGE
Start: 2020-06-02

## 2020-06-02 RX ORDER — TRAMADOL HYDROCHLORIDE 50 MG/1
1 TABLET ORAL
Qty: 0 | Refills: 0 | DISCHARGE
Start: 2020-06-02

## 2020-06-02 RX ADMIN — LAMOTRIGINE 200 MILLIGRAM(S): 25 TABLET, ORALLY DISINTEGRATING ORAL at 12:10

## 2020-06-02 RX ADMIN — Medication 325 MILLIGRAM(S): at 06:35

## 2020-06-02 RX ADMIN — POLYETHYLENE GLYCOL 3350 17 GRAM(S): 17 POWDER, FOR SOLUTION ORAL at 12:11

## 2020-06-02 RX ADMIN — Medication 975 MILLIGRAM(S): at 06:35

## 2020-06-02 RX ADMIN — Medication 975 MILLIGRAM(S): at 12:11

## 2020-06-02 RX ADMIN — LOSARTAN POTASSIUM 25 MILLIGRAM(S): 100 TABLET, FILM COATED ORAL at 06:37

## 2020-06-02 RX ADMIN — PANTOPRAZOLE SODIUM 40 MILLIGRAM(S): 20 TABLET, DELAYED RELEASE ORAL at 06:37

## 2020-06-02 RX ADMIN — OLANZAPINE 10 MILLIGRAM(S): 15 TABLET, FILM COATED ORAL at 15:35

## 2020-06-02 RX ADMIN — TICAGRELOR 90 MILLIGRAM(S): 90 TABLET ORAL at 06:37

## 2020-06-02 RX ADMIN — CELECOXIB 200 MILLIGRAM(S): 200 CAPSULE ORAL at 06:36

## 2020-06-02 RX ADMIN — CARVEDILOL PHOSPHATE 3.12 MILLIGRAM(S): 80 CAPSULE, EXTENDED RELEASE ORAL at 06:36

## 2020-06-02 NOTE — DISCHARGE NOTE NURSING/CASE MANAGEMENT/SOCIAL WORK - PATIENT PORTAL LINK FT
You can access the FollowMyHealth Patient Portal offered by [facility] by registering at the following website: [email or web address]. By joining [facility]’s FollowMyHealth portal, you will also be able to view your health information using other applications (apps) compatible with our system.

## 2020-06-02 NOTE — PROGRESS NOTE ADULT - SUBJECTIVE AND OBJECTIVE BOX
Pt seen/examined. Doing well. Pain controlled. No acute overnight complaints or events.    Vital Signs Last 24 Hrs  T(C): 36.5 (02 Jun 2020 00:15), Max: 36.6 (01 Jun 2020 16:59)  T(F): 97.7 (02 Jun 2020 00:15), Max: 97.8 (01 Jun 2020 16:59)  HR: 79 (02 Jun 2020 00:15) (68 - 85)  BP: 121/68 (02 Jun 2020 00:15) (101/60 - 131/77)  BP(mean): --  RR: 18 (02 Jun 2020 00:15) (18 - 20)  SpO2: 93% (02 Jun 2020 00:15) (92% - 93%)    Gen: awake, alert, NAD  Resp: no increased work of breathing  LLE:  Dressing c/d/i   +EHL/FHL/TA/GS  SILT S/S/SP/DP  +DP/PT Pulses  Compartments soft  No calf TTP     69yFemale s/p left AYLA POD 3    - Pain control  - mechanical/DVT ppx  - OOB/PT  - WBAT LLE  - Ice and elevate LLE  - Dispo planning  - Will d/w attending and advise if plan changes

## 2020-06-02 NOTE — CONSULT NOTE ADULT - SUBJECTIVE AND OBJECTIVE BOX
HPI:  69 year old female with hx of CAD, Bipolar Disease, Osteoarthritis of hip. Has been having increase pain left hip when walking. Scheduled for Total hip 5/30    Patient was admitted on 5/30, reports increasing left hip pain since February, was originally scheduled for March 30. She was very active prior to hip pain, exercising 5 days/week, says she's been limping around without AD for weeks. Had Left THR on 5/30, WBAT LLE, no post op complications, today complains of pain, some relief with pain meds, had BM/voiding, notes swelling in left thigh.     REVIEW OF SYSTEMS  Constitutional - No fever, No weight loss, No fatigue  HEENT - No eye pain, No visual disturbances, No difficulty hearing, No tinnitus, No vertigo, No neck pain  Respiratory - No cough, No wheezing, No shortness of breath  Cardiovascular - No chest pain, No palpitations  Gastrointestinal - No abdominal pain, No nausea, No vomiting, No diarrhea, No constipation  Genitourinary - No dysuria, No frequency, No hematuria, No incontinence  Neurological - No headaches, No memory loss, No loss of strength, No numbness, No tremors  Skin - No itching, No rashes, No lesions   Endocrine - No temperature intolerance  Musculoskeletal - No joint pain, No joint swelling, No muscle pain  Psychiatric - No depression, No anxiety    VITALS  T(C): 36.3 (06-02-20 @ 08:30), Max: 36.6 (06-01-20 @ 16:59)  HR: 77 (06-02-20 @ 08:30) (68 - 79)  BP: 138/84 (06-02-20 @ 08:30) (101/60 - 138/84)  RR: 18 (06-02-20 @ 08:30) (18 - 18)  SpO2: 92% (06-02-20 @ 08:30) (92% - 93%)  Wt(kg): --    PAST MEDICAL & SURGICAL HISTORY  Cloudy urine  Gait disturbance  Anxiousness  Weight loss  Cancer of skin  Benign hypertension  H/O gastroesophageal reflux (GERD)  COPD (chronic obstructive pulmonary disease)  Myocardial infarct, old  Unilateral primary osteoarthritis, left hip  CAD (coronary artery disease)  Bipolar disorder  S/P tonsillectomy  S/P cardiac catheterization  No significant past surgical history      SOCIAL HISTORY  Smoking - Denied  EtOH - Denied   Drugs - Denied    FUNCTIONAL HISTORY  Lives alone, second floor, 8 plus 17 steps to enter  Independent AMB and ADLs PTA    CURRENT FUNCTIONAL STATUS  6/2 PT  Sit-Stand Transfer Training  Transfer Training Sit-to-Stand Transfer: contact guard;  verbal cues;  1 person assist;  weight-bearing as tolerated  Transfer Training Stand-to-Sit Transfer: contact guard;  verbal cues;  1 person assist;  weight-bearing as tolerated  Sit-to-Stand Transfer Training Transfer Safety Analysis: decreased balance;  decreased weight-shifting ability;  decreased ROM;  decreased strength;  impaired balance;  pain    Gait Training  Gait Training: contact guard;  verbal cues;  1 person assist;  weight-bearing as tolerated   rolling walker;  40 ft  Gait Analysis: swing-to gait   decreased leonardo;  increased time in double stance;  decreased step length;  decreased stride length;  decreased weight-shifting ability;  impaired balance;  decreased strength;  decreased ROM;  pain;  40 ft;  rolling walker    Therapeutic Exercise  Therapeutic Exercise Detail: standing: heel raises, minisquats, SLS     6/1 OT    Bed Mobility  Bed Mobility Training Supine-to-Sit: independent    Sit-Stand Transfer Training  Transfer Training Sit-to-Stand Transfer: independent;  rolling walker  Transfer Training Stand-to-Sit Transfer: independent;  rolling walker  Sit-to-Stand Transfer Training Transfer Safety Analysis: decreased strength;  impaired balance;  pain    Toilet Transfer Training  Transfer Training Toilet Transfer: supervsion;  rolling walker  Toilet Transfer Training Transfer Safety Analysis: decreased strength;  impaired balance;  pain    Therapeutic Exercise  Therapeutic Exercise Detail: Ambulation to bathroom SUP with RW. Valentin static standing while grooming at the sink at least 3 minutes.     Lower Body Dressing Training  Lower Body Dressing Training Assistance: moderate assist (50% patient effort);  1 person assist;  verbal cues;  set-up required;  decreased ROM;  decreased strength;  impaired balance;  pain    Grooming Training  Grooming Training Assistance: supervsion;  set-up required;  brushed teeth while standing at the sink with RW;  decreased strength;  impaired balance    Toilet Training  Toilet Training Assistance: supervsion;  decreased strength;  impaired balance      FAMILY HISTORY   no pertinent history in primary relatives     RECENT LABS/IMAGING      < from: Xray Pelvis AP only (05.30.20 @ 12:33) >    IMPRESSION:   Postsurgical changes status post left total hip arthroplasty. No acute displaced periprosthetic fracture is seen.  Postsurgical changes in the soft tissues about the left hip. The right hip joint space appears preserved.      < end of copied text >          ALLERGIES  codeine (Unknown)  Cogentin (Other)  haloperidol (Other)      MEDICATIONS   acetaminophen  IVPB .. 1000 milliGRAM(s) IV Intermittent every 8 hours  acetaminophen  IVPB .. 1000 milliGRAM(s) IV Intermittent once  aluminum hydroxide/magnesium hydroxide/simethicone Suspension 30 milliLiter(s) Oral four times a day PRN  aspirin enteric coated 325 milliGRAM(s) Oral two times a day  bisacodyl Suppository 10 milliGRAM(s) Rectal daily PRN  carvedilol 3.125 milliGRAM(s) Oral every 12 hours  ceFAZolin   IVPB 2000 milliGRAM(s) IV Intermittent once  celecoxib 200 milliGRAM(s) Oral every 12 hours  chlorhexidine 2% Cloths 1 Application(s) Topical once  lactated ringers. 500 milliLiter(s) IV Continuous <Continuous>  lamoTRIgine 100 milliGRAM(s) Oral at bedtime  lamoTRIgine 200 milliGRAM(s) Oral daily  losartan 25 milliGRAM(s) Oral daily  magnesium hydroxide Suspension 30 milliLiter(s) Oral once PRN  melatonin 3 milliGRAM(s) Oral at bedtime  nitroglycerin     SubLingual 0.4 milliGRAM(s) SubLingual every 5 minutes PRN  OLANZapine 10 milliGRAM(s) Oral at bedtime  ondansetron Injectable 4 milliGRAM(s) IV Push every 6 hours PRN  pantoprazole    Tablet 40 milliGRAM(s) Oral before breakfast  polyethylene glycol 3350 17 Gram(s) Oral daily  senna 2 Tablet(s) Oral at bedtime PRN  simvastatin 20 milliGRAM(s) Oral at bedtime  ticagrelor 90 milliGRAM(s) Oral two times a day  traMADol 25 milliGRAM(s) Oral every 6 hours PRN  traMADol 50 milliGRAM(s) Oral every 6 hours PRN      ----------------------------------------------------------------------------------------  PHYSICAL EXAM  Constitutional - NAD, Comfortable, sitting in chair   HEENT - NCAT, EOMI  Neck - Supple, No limited ROM  Chest - Breathing comfortably  Cardiovascular - well perfused   Abdomen - Soft   Extremities - Left hip dressing in place, some LLE edema   Neurologic Exam -                    Cognitive - Awake, Alert, AAO to self, place, date, year, situation     Communication - Fluent, No dysarthria     Cranial Nerves - CN 2-12 intact     Motor - No focal deficits                    LEFT    UE - ShAB 5/5, EF 5/5, EE 5/5, WE 5/5,  5/5                    RIGHT UE - ShAB 5/5, EF 5/5, EE 5/5, WE 5/5,  5/5                    LEFT    LE -KE 5/5, DF 5/5, PF 5/5                    RIGHT LE - HF 5/5, KE 5/5, DF 5/5, PF 5/5        Sensory - Intact to LT     Reflexes - DTR Intact, No primitive reflexes     Psychiatric - Mood stable, Affect WNL  ----------------------------------------------------------------------------------------  ASSESSMENT/PLAN  69yFemale with functional deficits after left total hip replacement   wbat LLE  on ASA, Brilinta   Pain - Tylenol, tramadol prn, add lidoderm patch/ice as needed   DVT PPX - SCDs  Rehab -   continue bedside PT/OT  elevate legs    Recommend [facility], patient DOES NOT meet acute inpatient rehabilitation criteria

## 2020-06-12 ENCOUNTER — APPOINTMENT (OUTPATIENT)
Dept: ORTHOPEDIC SURGERY | Facility: CLINIC | Age: 70
End: 2020-06-12
Payer: MEDICARE

## 2020-06-12 VITALS — TEMPERATURE: 98.1 F

## 2020-06-12 PROCEDURE — 73502 X-RAY EXAM HIP UNI 2-3 VIEWS: CPT | Mod: LT

## 2020-06-12 PROCEDURE — 99024 POSTOP FOLLOW-UP VISIT: CPT

## 2020-06-12 RX ORDER — AMOXICILLIN AND CLAVULANATE POTASSIUM 875; 125 MG/1; MG/1
875-125 TABLET, COATED ORAL
Qty: 14 | Refills: 0 | Status: DISCONTINUED | COMMUNITY
Start: 2020-05-27 | End: 2020-06-12

## 2020-06-15 ENCOUNTER — APPOINTMENT (OUTPATIENT)
Dept: FAMILY MEDICINE | Facility: CLINIC | Age: 70
End: 2020-06-15
Payer: MEDICARE

## 2020-06-15 VITALS
HEART RATE: 78 BPM | RESPIRATION RATE: 16 BRPM | TEMPERATURE: 98.8 F | SYSTOLIC BLOOD PRESSURE: 110 MMHG | OXYGEN SATURATION: 96 % | DIASTOLIC BLOOD PRESSURE: 68 MMHG

## 2020-06-15 DIAGNOSIS — M25.552 PAIN IN LEFT HIP: ICD-10-CM

## 2020-06-15 PROCEDURE — 99214 OFFICE O/P EST MOD 30 MIN: CPT

## 2020-06-15 NOTE — ASSESSMENT
[FreeTextEntry1] : cough: add Claritin daily, moist air in shower prn\par I was able to contact Dr. Mendoza for referral and also \par Lashonda Salinas LCSW for weekly counseling and/or referral to counselor\par patient agrees to start Wellbutrin , RTO 2 weeks for f/u and prn if you have any adverse effects\par you think could be from wellbutrin.\par RTO 2 weeks for f/u depression, smoking cessation

## 2020-06-15 NOTE — HISTORY OF PRESENT ILLNESS
[FreeTextEntry1] : Presents with daughter for post hospitalization visit s/p left THR. Feels not up to where she wants to be physically.+left hip pain still, but walked from car into our office and in hallway with walker well. Taking her cipro for possible UTI still. Concerned about having a cough. No fever, no n/v/d. +anxiety. Thinking that she needs a new psychiatrist. Currently seeing Dr. Staples. Has not yet started the Wellbutrin I prescribed although she has it at home. (Dr. Triana aware).  Patient admits to feeling somewhat down since surgery. Stopped Juul with nicotine and no longer taking Chantix. Daughter states she took all her mom's Juul devices away. \par \par ROS: negative except as noted above\par

## 2020-06-15 NOTE — PHYSICAL EXAM
[Well Developed] : well developed [Well Nourished] : well nourished [No Acute Distress] : no acute distress [Well-Appearing] : well-appearing [Normal Voice/Communication] : normal voice/communication [Normal Sclera/Conjunctiva] : normal sclera/conjunctiva [PERRL] : pupils equal round and reactive to light [No Accessory Muscle Use] : no accessory muscle use [No Respiratory Distress] : no respiratory distress  [Normal Rate] : normal rate  [Clear to Auscultation] : lungs were clear to auscultation bilaterally [Regular Rhythm] : with a regular rhythm [Normal S1, S2] : normal S1 and S2 [Normal Outer Ear/Nose] : the outer ears and nose were normal in appearance [Normal Oropharynx] : the oropharynx was normal [Speech Grossly Normal] : speech grossly normal [Memory Grossly Normal] : memory grossly normal [Alert and Oriented x3] : oriented to person, place, and time [Normal Affect] : the affect was normal [Normal Insight/Judgement] : insight and judgment were intact [de-identified] : +anxious mood, low mood [de-identified] : o/p normal except for sl erythema and dry

## 2020-06-19 ENCOUNTER — RX RENEWAL (OUTPATIENT)
Age: 70
End: 2020-06-19

## 2020-07-02 DIAGNOSIS — R09.81 NASAL CONGESTION: ICD-10-CM

## 2020-07-22 ENCOUNTER — APPOINTMENT (OUTPATIENT)
Dept: FAMILY MEDICINE | Facility: CLINIC | Age: 70
End: 2020-07-22
Payer: MEDICARE

## 2020-07-22 VITALS
TEMPERATURE: 97.6 F | WEIGHT: 205 LBS | HEIGHT: 61 IN | OXYGEN SATURATION: 96 % | SYSTOLIC BLOOD PRESSURE: 118 MMHG | BODY MASS INDEX: 38.71 KG/M2 | RESPIRATION RATE: 16 BRPM | HEART RATE: 91 BPM | DIASTOLIC BLOOD PRESSURE: 80 MMHG

## 2020-07-22 PROCEDURE — 99214 OFFICE O/P EST MOD 30 MIN: CPT

## 2020-07-22 RX ORDER — CIPROFLOXACIN HYDROCHLORIDE 500 MG/1
500 TABLET, FILM COATED ORAL TWICE DAILY
Qty: 10 | Refills: 0 | Status: COMPLETED | COMMUNITY
Start: 2020-06-01 | End: 2020-07-22

## 2020-07-29 NOTE — PHYSICAL EXAM
[No Acute Distress] : no acute distress [Well Nourished] : well nourished [Well Developed] : well developed [Well-Appearing] : well-appearing [Normal Voice/Communication] : normal voice/communication [Normal Sclera/Conjunctiva] : normal sclera/conjunctiva [Normal Outer Ear/Nose] : the outer ears and nose were normal in appearance [No Respiratory Distress] : no respiratory distress  [No Accessory Muscle Use] : no accessory muscle use [Clear to Auscultation] : lungs were clear to auscultation bilaterally [Regular Rhythm] : with a regular rhythm [Normal Rate] : normal rate  [Normal S1, S2] : normal S1 and S2 [Speech Grossly Normal] : speech grossly normal [Memory Grossly Normal] : memory grossly normal [Normal Affect] : the affect was normal [Alert and Oriented x3] : oriented to person, place, and time [Normal Insight/Judgement] : insight and judgment were intact

## 2020-07-29 NOTE — HISTORY OF PRESENT ILLNESS
[FreeTextEntry1] : Patient presents for f/u depression, obesity. Feels well, no HA, blurry vision or epistaxis. No SI/HI. Has not changed psychiatrist yet, her daughter wants her to change since Batool often complains about psychiatrist not listening to her, patient "hasn't gotten around to it" yet. Admits to eating more desserts/sweets lately. \par \par ROS: negative except as noted above\par

## 2020-08-19 ENCOUNTER — APPOINTMENT (OUTPATIENT)
Dept: CARDIOLOGY | Facility: CLINIC | Age: 70
End: 2020-08-19
Payer: MEDICARE

## 2020-08-19 ENCOUNTER — NON-APPOINTMENT (OUTPATIENT)
Age: 70
End: 2020-08-19

## 2020-08-19 VITALS
RESPIRATION RATE: 16 BRPM | OXYGEN SATURATION: 96 % | DIASTOLIC BLOOD PRESSURE: 83 MMHG | HEART RATE: 82 BPM | SYSTOLIC BLOOD PRESSURE: 118 MMHG | WEIGHT: 207 LBS | HEIGHT: 61 IN | BODY MASS INDEX: 39.08 KG/M2 | TEMPERATURE: 97.2 F

## 2020-08-19 PROCEDURE — 99214 OFFICE O/P EST MOD 30 MIN: CPT

## 2020-08-19 PROCEDURE — 93000 ELECTROCARDIOGRAM COMPLETE: CPT

## 2020-08-20 ENCOUNTER — APPOINTMENT (OUTPATIENT)
Dept: FAMILY MEDICINE | Facility: CLINIC | Age: 70
End: 2020-08-20
Payer: MEDICARE

## 2020-08-20 VITALS
WEIGHT: 202 LBS | SYSTOLIC BLOOD PRESSURE: 108 MMHG | RESPIRATION RATE: 16 BRPM | OXYGEN SATURATION: 94 % | TEMPERATURE: 96.9 F | BODY MASS INDEX: 38.17 KG/M2 | DIASTOLIC BLOOD PRESSURE: 70 MMHG | HEART RATE: 101 BPM

## 2020-08-20 PROCEDURE — 99214 OFFICE O/P EST MOD 30 MIN: CPT

## 2020-08-20 NOTE — ASSESSMENT
[FreeTextEntry1] : patient advised to take protonix 30 min before breakfast, and I will add 30 min before dinner x 1 month\par dietary changes discussed; avoid trigger foods\par Return to office if you feel worse or do not feel better\par await results of cardiac testing, go to ER if chest pain recurs

## 2020-08-20 NOTE — PHYSICAL EXAM
[No Acute Distress] : no acute distress [Well Nourished] : well nourished [Well Developed] : well developed [Well-Appearing] : well-appearing [Normal Voice/Communication] : normal voice/communication [Normal Sclera/Conjunctiva] : normal sclera/conjunctiva [Normal Oropharynx] : the oropharynx was normal [Normal Outer Ear/Nose] : the outer ears and nose were normal in appearance [No Respiratory Distress] : no respiratory distress  [No Accessory Muscle Use] : no accessory muscle use [Normal Rate] : normal rate  [Clear to Auscultation] : lungs were clear to auscultation bilaterally [Regular Rhythm] : with a regular rhythm [Normal S1, S2] : normal S1 and S2 [Non-distended] : non-distended [Soft] : abdomen soft [No Masses] : no abdominal mass palpated [No HSM] : no HSM [de-identified] : +pain with palpation over epigastrium

## 2020-08-23 NOTE — ASSESSMENT
[FreeTextEntry1] : I have asked Batool to undergo detailed cardiac testing in order to evaluate her overall cardiovascular risk.\par An assessment of both structural and functional heart disease was recommended to the patient. In this regard, a cardiac CTA was advised to the patient. I await the upcoming noninvasive cardiac testing in order to assess her overall cardiovascular risk. We discussed the pros and cons of plain treadmill stress testing nuclear stress testing and angiography including a sensitivity analysis. More than half of the face to face encounter of  45 min  was spent in counseling the patient with respect to  chest pains and cardiovascular risk.\par \par Quality measures \par Tobacco intervention indicated\par Statin for prevention of cardiovascular disease simva 40\par Hypertension compensated\par Aspirin for ischemic vascular disease 81 mg and Brilinta\par Tobacco screening cessation and intervention indicated\par \par Medical necessity\par This is a high encounter based upon two or more chronic illnesses with mild exacerbation requiring further management and evaluation.   .

## 2020-08-23 NOTE — REASON FOR VISIT
[Consultation] : a consultation regarding [Chest Pain] : chest pain [Coronary Artery Disease] : coronary artery disease [FreeTextEntry1] : Batool recently admits to coming off her diet. She had chest discomfort which may reflect heart burn or cardiac pains. she comes today for clarification of her   overall cardiovascular risk. she  was advised to undergo a complete cardiac evaluation. she denies current chest pains shortness of breath or loss of consciousness.

## 2020-08-25 ENCOUNTER — OUTPATIENT (OUTPATIENT)
Dept: OUTPATIENT SERVICES | Facility: HOSPITAL | Age: 70
LOS: 1 days | End: 2020-08-25
Payer: MEDICARE

## 2020-08-25 ENCOUNTER — RESULT REVIEW (OUTPATIENT)
Age: 70
End: 2020-08-25

## 2020-08-25 ENCOUNTER — APPOINTMENT (OUTPATIENT)
Dept: CARDIOLOGY | Facility: CLINIC | Age: 70
End: 2020-08-25

## 2020-08-25 DIAGNOSIS — R07.9 CHEST PAIN, UNSPECIFIED: ICD-10-CM

## 2020-08-25 DIAGNOSIS — Z98.890 OTHER SPECIFIED POSTPROCEDURAL STATES: Chronic | ICD-10-CM

## 2020-08-25 DIAGNOSIS — Z90.89 ACQUIRED ABSENCE OF OTHER ORGANS: Chronic | ICD-10-CM

## 2020-08-25 LAB
ALBUMIN SERPL ELPH-MCNC: 4.3 G/DL
ANION GAP SERPL CALC-SCNC: 13 MMOL/L
BUN SERPL-MCNC: 11 MG/DL
CALCIUM SERPL-MCNC: 9.2 MG/DL
CHLORIDE SERPL-SCNC: 107 MMOL/L
CO2 SERPL-SCNC: 24 MMOL/L
CREAT SERPL-MCNC: 0.77 MG/DL
GLUCOSE SERPL-MCNC: 109 MG/DL
PHOSPHATE SERPL-MCNC: 3.7 MG/DL
POTASSIUM SERPL-SCNC: 4.5 MMOL/L
SODIUM SERPL-SCNC: 144 MMOL/L

## 2020-08-25 PROCEDURE — 75574 CT ANGIO HRT W/3D IMAGE: CPT

## 2020-08-25 PROCEDURE — 75574 CT ANGIO HRT W/3D IMAGE: CPT | Mod: 26

## 2020-09-05 DIAGNOSIS — Z01.818 ENCOUNTER FOR OTHER PREPROCEDURAL EXAMINATION: ICD-10-CM

## 2020-09-06 ENCOUNTER — APPOINTMENT (OUTPATIENT)
Dept: DISASTER EMERGENCY | Facility: CLINIC | Age: 70
End: 2020-09-06

## 2020-09-07 LAB — SARS-COV-2 N GENE NPH QL NAA+PROBE: NOT DETECTED

## 2020-09-09 ENCOUNTER — OUTPATIENT (OUTPATIENT)
Dept: OUTPATIENT SERVICES | Facility: HOSPITAL | Age: 70
LOS: 1 days | End: 2020-09-09
Payer: MEDICARE

## 2020-09-09 VITALS
DIASTOLIC BLOOD PRESSURE: 85 MMHG | WEIGHT: 195.11 LBS | OXYGEN SATURATION: 97 % | HEART RATE: 82 BPM | HEIGHT: 62 IN | RESPIRATION RATE: 16 BRPM | TEMPERATURE: 98 F | SYSTOLIC BLOOD PRESSURE: 147 MMHG

## 2020-09-09 VITALS
OXYGEN SATURATION: 95 % | HEART RATE: 80 BPM | RESPIRATION RATE: 16 BRPM | DIASTOLIC BLOOD PRESSURE: 72 MMHG | SYSTOLIC BLOOD PRESSURE: 138 MMHG

## 2020-09-09 DIAGNOSIS — Z96.649 PRESENCE OF UNSPECIFIED ARTIFICIAL HIP JOINT: Chronic | ICD-10-CM

## 2020-09-09 DIAGNOSIS — R94.39 ABNORMAL RESULT OF OTHER CARDIOVASCULAR FUNCTION STUDY: ICD-10-CM

## 2020-09-09 DIAGNOSIS — Z98.890 OTHER SPECIFIED POSTPROCEDURAL STATES: Chronic | ICD-10-CM

## 2020-09-09 DIAGNOSIS — Z90.89 ACQUIRED ABSENCE OF OTHER ORGANS: Chronic | ICD-10-CM

## 2020-09-09 LAB
ALBUMIN SERPL ELPH-MCNC: 4.1 G/DL — SIGNIFICANT CHANGE UP (ref 3.3–5)
ALP SERPL-CCNC: 104 U/L — SIGNIFICANT CHANGE UP (ref 40–120)
ALT FLD-CCNC: 13 U/L — SIGNIFICANT CHANGE UP (ref 10–45)
ANION GAP SERPL CALC-SCNC: 9 MMOL/L — SIGNIFICANT CHANGE UP (ref 5–17)
AST SERPL-CCNC: 16 U/L — SIGNIFICANT CHANGE UP (ref 10–40)
BILIRUB SERPL-MCNC: 0.3 MG/DL — SIGNIFICANT CHANGE UP (ref 0.2–1.2)
BUN SERPL-MCNC: 11 MG/DL — SIGNIFICANT CHANGE UP (ref 7–23)
CALCIUM SERPL-MCNC: 9.1 MG/DL — SIGNIFICANT CHANGE UP (ref 8.4–10.5)
CHLORIDE SERPL-SCNC: 107 MMOL/L — SIGNIFICANT CHANGE UP (ref 96–108)
CO2 SERPL-SCNC: 27 MMOL/L — SIGNIFICANT CHANGE UP (ref 22–31)
CREAT SERPL-MCNC: 0.7 MG/DL — SIGNIFICANT CHANGE UP (ref 0.5–1.3)
GLUCOSE SERPL-MCNC: 110 MG/DL — HIGH (ref 70–99)
HCT VFR BLD CALC: 40.3 % — SIGNIFICANT CHANGE UP (ref 34.5–45)
HGB BLD-MCNC: 13.4 G/DL — SIGNIFICANT CHANGE UP (ref 11.5–15.5)
MCHC RBC-ENTMCNC: 28.7 PG — SIGNIFICANT CHANGE UP (ref 27–34)
MCHC RBC-ENTMCNC: 33.3 GM/DL — SIGNIFICANT CHANGE UP (ref 32–36)
MCV RBC AUTO: 86.3 FL — SIGNIFICANT CHANGE UP (ref 80–100)
NRBC # BLD: 0 /100 WBCS — SIGNIFICANT CHANGE UP (ref 0–0)
PLATELET # BLD AUTO: 230 K/UL — SIGNIFICANT CHANGE UP (ref 150–400)
POTASSIUM SERPL-MCNC: 4.3 MMOL/L — SIGNIFICANT CHANGE UP (ref 3.5–5.3)
POTASSIUM SERPL-SCNC: 4.3 MMOL/L — SIGNIFICANT CHANGE UP (ref 3.5–5.3)
PROT SERPL-MCNC: 6.5 G/DL — SIGNIFICANT CHANGE UP (ref 6–8.3)
RBC # BLD: 4.67 M/UL — SIGNIFICANT CHANGE UP (ref 3.8–5.2)
RBC # FLD: 12 % — SIGNIFICANT CHANGE UP (ref 10.3–14.5)
SODIUM SERPL-SCNC: 143 MMOL/L — SIGNIFICANT CHANGE UP (ref 135–145)
WBC # BLD: 7.01 K/UL — SIGNIFICANT CHANGE UP (ref 3.8–10.5)
WBC # FLD AUTO: 7.01 K/UL — SIGNIFICANT CHANGE UP (ref 3.8–10.5)

## 2020-09-09 PROCEDURE — 92928 PRQ TCAT PLMT NTRAC ST 1 LES: CPT | Mod: LC

## 2020-09-09 PROCEDURE — C1874: CPT

## 2020-09-09 PROCEDURE — 93010 ELECTROCARDIOGRAM REPORT: CPT | Mod: 77

## 2020-09-09 PROCEDURE — C1887: CPT

## 2020-09-09 PROCEDURE — 93571 IV DOP VEL&/PRESS C FLO 1ST: CPT | Mod: 26,LD

## 2020-09-09 PROCEDURE — C1760: CPT

## 2020-09-09 PROCEDURE — 93458 L HRT ARTERY/VENTRICLE ANGIO: CPT | Mod: 26,59

## 2020-09-09 PROCEDURE — 99152 MOD SED SAME PHYS/QHP 5/>YRS: CPT

## 2020-09-09 PROCEDURE — 93571 IV DOP VEL&/PRESS C FLO 1ST: CPT | Mod: LD

## 2020-09-09 PROCEDURE — 99153 MOD SED SAME PHYS/QHP EA: CPT

## 2020-09-09 PROCEDURE — 93005 ELECTROCARDIOGRAM TRACING: CPT

## 2020-09-09 PROCEDURE — 85027 COMPLETE CBC AUTOMATED: CPT

## 2020-09-09 PROCEDURE — 93010 ELECTROCARDIOGRAM REPORT: CPT

## 2020-09-09 PROCEDURE — C9600: CPT | Mod: LC

## 2020-09-09 PROCEDURE — 80053 COMPREHEN METABOLIC PANEL: CPT

## 2020-09-09 PROCEDURE — C1769: CPT

## 2020-09-09 PROCEDURE — 93458 L HRT ARTERY/VENTRICLE ANGIO: CPT | Mod: 59

## 2020-09-09 PROCEDURE — C1894: CPT

## 2020-09-09 RX ORDER — LAMOTRIGINE 25 MG/1
1 TABLET, ORALLY DISINTEGRATING ORAL
Qty: 0 | Refills: 0 | DISCHARGE

## 2020-09-09 RX ORDER — ASPIRIN/CALCIUM CARB/MAGNESIUM 324 MG
1 TABLET ORAL
Qty: 30 | Refills: 0
Start: 2020-09-09 | End: 2020-10-08

## 2020-09-09 RX ORDER — MUPIROCIN 20 MG/G
1 OINTMENT TOPICAL
Qty: 0 | Refills: 0 | DISCHARGE

## 2020-09-09 RX ORDER — TICAGRELOR 90 MG/1
1 TABLET ORAL
Qty: 180 | Refills: 3
Start: 2020-09-09 | End: 2021-09-03

## 2020-09-09 RX ORDER — SODIUM CHLORIDE 9 MG/ML
3 INJECTION INTRAMUSCULAR; INTRAVENOUS; SUBCUTANEOUS EVERY 8 HOURS
Refills: 0 | Status: DISCONTINUED | OUTPATIENT
Start: 2020-09-09 | End: 2020-09-23

## 2020-09-09 RX ORDER — TICAGRELOR 90 MG/1
1 TABLET ORAL
Qty: 0 | Refills: 0 | DISCHARGE

## 2020-09-09 RX ORDER — NITROGLYCERIN 6.5 MG
1 CAPSULE, EXTENDED RELEASE ORAL
Qty: 0 | Refills: 0 | DISCHARGE

## 2020-09-09 RX ORDER — SIMVASTATIN 20 MG/1
1 TABLET, FILM COATED ORAL
Qty: 0 | Refills: 0 | DISCHARGE

## 2020-09-09 RX ORDER — PANTOPRAZOLE SODIUM 20 MG/1
1 TABLET, DELAYED RELEASE ORAL
Qty: 0 | Refills: 0 | DISCHARGE

## 2020-09-09 NOTE — ASU DISCHARGE PLAN (ADULT/PEDIATRIC) - CARE PROVIDER_API CALL
Avelino Beach  CARDIOLOGY  [address], [address]  [place] [id number]  Phone: [phone]  Fax: [phone]  Follow Up Time: 1 week

## 2020-09-09 NOTE — ASU DISCHARGE PLAN (ADULT/PEDIATRIC) - ASU DC SPECIAL INSTRUCTIONSFT
No heavy lifting, strenuous activity, bending, straining, or unnecessary stair climbing for 2 weeks. No driving for 2 days. You may shower 24 hours following the procedure but avoid baths/swimming for 1 week. Check your groin site for bleeding and/or swelling daily following procedure and call your doctor immediately if it occurs or if you experience increased pain at the site. Follow up with your cardiologist in 1-2 weeks. You may call [facility] if you have any questions/concerns regarding your procedure [phone].

## 2020-09-09 NOTE — H&P CARDIOLOGY - FAMILY HISTORY
Mother  Still living? Unknown  Family history of lung cancer, Age at diagnosis: Age Unknown     Sibling  Still living? Unknown  Family history of lung cancer, Age at diagnosis: Age Unknown     Father  Still living? Unknown  Family history of brain cancer, Age at diagnosis: Age Unknown

## 2020-09-09 NOTE — ASU PATIENT PROFILE, ADULT - PSH
History of hip replacement  May 2020  S/P cardiac catheterization  x 4  S/P tonsillectomy  childhood

## 2020-09-09 NOTE — ASU PATIENT PROFILE, ADULT - PMH
Anxiousness  concern re surgery  Arm fracture, right  2010  s/p fall  Benign hypertension    Bipolar disorder  oral medication  CAD (coronary artery disease)    Cancer of skin  multiple locations several have been removed and or laser    face (forehead), nose, arms  Cloudy urine    COPD (chronic obstructive pulmonary disease)    Gait disturbance    H/O gastroesophageal reflux (GERD)    Myocardial infarct, old  2016  Unilateral primary osteoarthritis, left hip    Weight loss  attempting to loss weight states so far has lost about 10 pounds

## 2020-09-09 NOTE — H&P CARDIOLOGY - HISTORY OF PRESENT ILLNESS
71 yo  female with PMH significant for MI in 2016 subsequent stent placement ([facility]), anxiety/ depression, alcohol abuse (sober 30 years) presents today for cardiac angiogram. Patient reports intermittent chest discomfort with severe epigastric burning. Patient has experienced these symptoms earlier this year- symptoms relieved with TUMS. Recently symptoms are more severe and frequent- TUMS not improving reflux symptoms- Similar to symptoms when she had MI in 2016. Seen and evaluated by Dr Beach _ Cardiac CTA ordered- 71 yo  female with PMH significant for MI in 2016 subsequent stent placement ([facility]), anxiety/ depression, bipolar disorder, alcohol abuse (sober 30 years), former smoker presents today for cardiac angiogram. Patient reports intermittent chest discomfort with severe epigastric burning. Patient has experienced these symptoms earlier this year- symptoms relieved with TUMS. Recently symptoms are more severe and frequent- TUMS not improving reflux symptoms- Similar to symptoms when she had MI in 2016. Seen and evaluated by Dr Beach _ Cardiac CTA ordered- 69 yo  female with PMH significant for MI in 2016 subsequent stent placement ([facility]), anxiety/ depression, bipolar disorder, alcohol abuse (sober 30 years), former smoker presents today for cardiac angiogram. Patient reports intermittent chest discomfort with severe epigastric burning. Patient has experienced these symptoms earlier this year- symptoms relieved with TUMS. Recently symptoms are more severe and frequent- TUMS not improving reflux symptoms- Similar to symptoms when she had MI in 2016. Seen and evaluated by Dr Beach _ Cardiac CTA ordered- mLAD 30-49%, pLCx <30%, dLAD >70%, pRCA 30-49%. Here today for [facility] for further ischemic evaluation 71 yo  female with PMH significant for MI in 2016 subsequent stent placement ([facility]), anxiety/ depression, bipolar disorder, alcohol abuse (sober 30 years), former smoker presents today for cardiac angiogram. Patient reports intermittent chest discomfort with severe epigastric burning. Patient has experienced epigastric burning earlier this year- relieved with TUMS. Recently symptoms are more severe and frequent- TUMS not improving reflux symptoms- Similar to symptoms when she had MI in 2016. Seen and evaluated by Dr Beach _ Cardiac CTA ordered- mLAD 30-49%, pLCx <30%, dLAD >70%, pRCA 30-49%. Here today for [facility] for further ischemic evaluation

## 2020-09-09 NOTE — ASU DISCHARGE PLAN (ADULT/PEDIATRIC) - CALL YOUR DOCTOR IF YOU HAVE ANY OF THE FOLLOWING:
Swelling that gets worse/Pain not relieved by Medications/Numbness, tingling, color or temperature change to extremity/Bleeding that does not stop/Wound/Surgical Site with redness, or foul smelling discharge or pus

## 2020-09-09 NOTE — CHART NOTE - NSCHARTNOTEFT_GEN_A_CORE
FEMORAL ANGIOSEAL ASSESSMENT NOTE    Right groin angioseal in place with good hemostasis w/o any bleeding or hematoma  Pulses in the RIGHT lower extremity are palpable, (right femoral and right pedal pulses + 2).  Right groin is soft/non tender  Patient denies leg, foot  or chest pain  post 12 lead EKG WNL, no ST or T wave changes noted when compared to pre procedural EKG    Complications: None    Comments: patient is to remain bed rest for the next 2 hours.

## 2020-09-15 ENCOUNTER — RX RENEWAL (OUTPATIENT)
Age: 70
End: 2020-09-15

## 2020-09-18 PROBLEM — H10.9 UNSPECIFIED CONJUNCTIVITIS: Chronic | Status: ACTIVE | Noted: 2020-09-09

## 2020-09-18 PROBLEM — S42.301A UNSPECIFIED FRACTURE OF SHAFT OF HUMERUS, RIGHT ARM, INITIAL ENCOUNTER FOR CLOSED FRACTURE: Chronic | Status: ACTIVE | Noted: 2020-09-09

## 2020-09-23 ENCOUNTER — APPOINTMENT (OUTPATIENT)
Dept: ORTHOPEDIC SURGERY | Facility: CLINIC | Age: 70
End: 2020-09-23
Payer: MEDICARE

## 2020-09-23 DIAGNOSIS — Z96.642 PRESENCE OF LEFT ARTIFICIAL HIP JOINT: ICD-10-CM

## 2020-09-23 PROCEDURE — 73502 X-RAY EXAM HIP UNI 2-3 VIEWS: CPT | Mod: LT

## 2020-09-23 PROCEDURE — 99214 OFFICE O/P EST MOD 30 MIN: CPT

## 2020-09-30 ENCOUNTER — NON-APPOINTMENT (OUTPATIENT)
Age: 70
End: 2020-09-30

## 2020-09-30 ENCOUNTER — APPOINTMENT (OUTPATIENT)
Dept: CARDIOLOGY | Facility: CLINIC | Age: 70
End: 2020-09-30
Payer: MEDICARE

## 2020-09-30 VITALS
BODY MASS INDEX: 40.22 KG/M2 | WEIGHT: 213 LBS | HEIGHT: 61 IN | SYSTOLIC BLOOD PRESSURE: 108 MMHG | HEART RATE: 75 BPM | TEMPERATURE: 98.2 F | DIASTOLIC BLOOD PRESSURE: 63 MMHG | OXYGEN SATURATION: 96 % | RESPIRATION RATE: 16 BRPM

## 2020-09-30 PROCEDURE — 99214 OFFICE O/P EST MOD 30 MIN: CPT

## 2020-09-30 PROCEDURE — 93000 ELECTROCARDIOGRAM COMPLETE: CPT

## 2020-09-30 NOTE — REASON FOR VISIT
[Follow-Up - Clinic] : a clinic follow-up of [Coronary Artery Disease] : coronary artery disease [Hyperlipidemia] : hyperlipidemia [Medication Management] : Medication management [Family Member] : family member [Other: _____] : [unfilled] [FreeTextEntry1] : Batool recently came to me with complaints of chest pains and epigastric discomfort. A recent nuclear stress test was was read as normal however it most likely reflected a false negative response given multivessel coronary disease. A cardiac CTA was abnormal and indeed she came to cardiac catheterization on 9/9/20 which demonstrated a 90% circumflex and a 70% LAD lesion which were intervened upon. She comes today for followup visit. I suspect the right coronary lesion was nonvisualized during nuclear testing and the LAD and circumflex represented a balanced ischemia.

## 2020-09-30 NOTE — PHYSICAL EXAM
[General Appearance - Well Developed] : well developed [Normal Appearance] : normal appearance [Well Groomed] : well groomed [General Appearance - Well Nourished] : well nourished [No Deformities] : no deformities [General Appearance - In No Acute Distress] : no acute distress [Normal Conjunctiva] : the conjunctiva exhibited no abnormalities [Eyelids - No Xanthelasma] : the eyelids demonstrated no xanthelasmas [Normal Oral Mucosa] : normal oral mucosa [No Oral Pallor] : no oral pallor [No Oral Cyanosis] : no oral cyanosis [Normal Jugular Venous A Waves Present] : normal jugular venous A waves present [Normal Jugular Venous V Waves Present] : normal jugular venous V waves present [No Jugular Venous Fierro A Waves] : no jugular venous fierro A waves [Respiration, Rhythm And Depth] : normal respiratory rhythm and effort [Exaggerated Use Of Accessory Muscles For Inspiration] : no accessory muscle use [Auscultation Breath Sounds / Voice Sounds] : lungs were clear to auscultation bilaterally [Heart Rate And Rhythm] : heart rate and rhythm were normal [Heart Sounds] : normal S1 and S2 [Murmurs] : no murmurs present [Abdomen Soft] : soft [Abdomen Tenderness] : non-tender [Abdomen Mass (___ Cm)] : no abdominal mass palpated [Abnormal Walk] : normal gait [Gait - Sufficient For Exercise Testing] : the gait was sufficient for exercise testing [Nail Clubbing] : no clubbing of the fingernails [Cyanosis, Localized] : no localized cyanosis [Petechial Hemorrhages (___cm)] : no petechial hemorrhages [Skin Color & Pigmentation] : normal skin color and pigmentation [] : no rash [No Venous Stasis] : no venous stasis [Skin Lesions] : no skin lesions [No Skin Ulcers] : no skin ulcer [No Xanthoma] : no  xanthoma was observed [Oriented To Time, Place, And Person] : oriented to person, place, and time [Affect] : the affect was normal [Mood] : the mood was normal [No Anxiety] : not feeling anxious

## 2020-09-30 NOTE — ASSESSMENT
[FreeTextEntry1] : We discussed secondary risk reduction. A DASH diet was recommended along with a statin and a dual antiplatelet therapy for at least one year at which time aspirin should  be continued life long. Smoking is proscribed and weight reduction was emphasized/. We discussed the use of Wellbutrin and  would substitute metoprolol for carvedilol an increased dose of a statin in order to achieve a target LDL of 70 or less. Consideration a GI evaluation pending clinical course  We discussed all issues with her daughter who is quite supportive and anxious to reduce her secondary risk

## 2020-10-22 ENCOUNTER — RX RENEWAL (OUTPATIENT)
Age: 70
End: 2020-10-22

## 2020-11-20 ENCOUNTER — RX RENEWAL (OUTPATIENT)
Age: 70
End: 2020-11-20

## 2020-11-25 ENCOUNTER — NON-APPOINTMENT (OUTPATIENT)
Age: 70
End: 2020-11-25

## 2020-11-25 ENCOUNTER — APPOINTMENT (OUTPATIENT)
Dept: CARDIOLOGY | Facility: CLINIC | Age: 70
End: 2020-11-25
Payer: MEDICARE

## 2020-11-25 VITALS
HEIGHT: 61 IN | DIASTOLIC BLOOD PRESSURE: 65 MMHG | HEART RATE: 79 BPM | RESPIRATION RATE: 16 BRPM | WEIGHT: 213 LBS | SYSTOLIC BLOOD PRESSURE: 98 MMHG | BODY MASS INDEX: 40.22 KG/M2 | TEMPERATURE: 97.2 F | OXYGEN SATURATION: 94 %

## 2020-11-25 PROCEDURE — 99214 OFFICE O/P EST MOD 30 MIN: CPT

## 2020-11-25 PROCEDURE — 93000 ELECTROCARDIOGRAM COMPLETE: CPT

## 2020-11-27 NOTE — ASSESSMENT
[FreeTextEntry1] : More than half of the face to face encounter of 25min   was spent in counseling the patient with respect to  cardiovascular risk and known overlap of coronary disease and acid reflux\par \par Quality measures \par Tobacco intervention not indicated\par Statin for prevention of cardiovascular disease [id number] mg\par Hypertension compensated\par Aspirin for ischemic vascular disease 81 mg\par Tobacco screening cessation and intervention not indicated\par \par Medical necessity\par This is a high encounter based upon two or more chronic illnesses with mild exacerbation requiring further management and evaluation.   .

## 2020-11-27 NOTE — REASON FOR VISIT
[Follow-Up - Clinic] : a clinic follow-up of [Chest Pain] : chest pain [Coronary Artery Disease] : coronary artery disease [Family Member] : family member [Other: _____] : [unfilled] [FreeTextEntry1] : Batool recently came to me with complaints of chest pains and epigastric discomfort.Batool to be reflux related although clearly there may be some overlap with  disease I suggested antiacids such as Mylanta should continue her PPI regimen as well. She did not feel that an ER referral was necessary at the current time however would continue empiric care for known coronary disease" follow-up

## 2020-12-07 ENCOUNTER — APPOINTMENT (OUTPATIENT)
Dept: FAMILY MEDICINE | Facility: CLINIC | Age: 70
End: 2020-12-07
Payer: MEDICARE

## 2020-12-07 VITALS
TEMPERATURE: 96.9 F | OXYGEN SATURATION: 94 % | SYSTOLIC BLOOD PRESSURE: 100 MMHG | HEART RATE: 84 BPM | WEIGHT: 215 LBS | HEIGHT: 61 IN | DIASTOLIC BLOOD PRESSURE: 60 MMHG | BODY MASS INDEX: 40.59 KG/M2 | RESPIRATION RATE: 16 BRPM

## 2020-12-07 DIAGNOSIS — K29.70 GASTRITIS, UNSPECIFIED, W/OUT BLEEDING: ICD-10-CM

## 2020-12-07 PROCEDURE — 99214 OFFICE O/P EST MOD 30 MIN: CPT

## 2020-12-07 RX ORDER — OLANZAPINE 10 MG/1
10 TABLET, FILM COATED ORAL
Qty: 90 | Refills: 0 | Status: ACTIVE | COMMUNITY
Start: 2020-12-07

## 2020-12-07 NOTE — ASSESSMENT
[FreeTextEntry1] : advised to take protonix on empty stomach in morning, then wait 30 min to eat, tums prn\par weight loss advised\par RTO 3 months for f/u appointment

## 2020-12-07 NOTE — HISTORY OF PRESENT ILLNESS
[FreeTextEntry1] : Presents for f/u BP. Had angiogram and stents placed on 9/9/2020. Feels well, no HA, Blurry vision or epistaxis reported. Took all her meds this morning. States sometimes needs tums for gastritis. Has been taking protonix with food however. \par \par psychiatrist: Dr. Staples whom she sees regularly

## 2020-12-07 NOTE — PHYSICAL EXAM
[No Acute Distress] : no acute distress [Well Nourished] : well nourished [Well Developed] : well developed [Well-Appearing] : well-appearing [Normal Voice/Communication] : normal voice/communication [Normal Sclera/Conjunctiva] : normal sclera/conjunctiva [Normal Outer Ear/Nose] : the outer ears and nose were normal in appearance [Normal Oropharynx] : the oropharynx was normal [No Lymphadenopathy] : no lymphadenopathy [Supple] : supple [No Respiratory Distress] : no respiratory distress  [No Accessory Muscle Use] : no accessory muscle use [Clear to Auscultation] : lungs were clear to auscultation bilaterally [Normal Rate] : normal rate  [Regular Rhythm] : with a regular rhythm [Normal S1, S2] : normal S1 and S2 [Coordination Grossly Intact] : coordination grossly intact [No Focal Deficits] : no focal deficits [Speech Grossly Normal] : speech grossly normal [Memory Grossly Normal] : memory grossly normal [Normal Affect] : the affect was normal [Alert and Oriented x3] : oriented to person, place, and time [Normal Mood] : the mood was normal [Normal Insight/Judgement] : insight and judgment were intact

## 2020-12-23 PROBLEM — N39.0 URINARY TRACT INFECTION, ACUTE: Status: RESOLVED | Noted: 2020-06-01 | Resolved: 2020-12-23

## 2020-12-23 PROBLEM — Z86.69 HISTORY OF CONJUNCTIVITIS: Status: RESOLVED | Noted: 2020-05-27 | Resolved: 2020-12-23

## 2021-01-27 ENCOUNTER — APPOINTMENT (OUTPATIENT)
Dept: CARDIOLOGY | Facility: CLINIC | Age: 71
End: 2021-01-27
Payer: MEDICARE

## 2021-01-27 ENCOUNTER — NON-APPOINTMENT (OUTPATIENT)
Age: 71
End: 2021-01-27

## 2021-01-27 VITALS
HEART RATE: 84 BPM | OXYGEN SATURATION: 86 % | SYSTOLIC BLOOD PRESSURE: 123 MMHG | TEMPERATURE: 98.2 F | RESPIRATION RATE: 16 BRPM | DIASTOLIC BLOOD PRESSURE: 83 MMHG | HEIGHT: 61 IN

## 2021-01-27 PROCEDURE — 99214 OFFICE O/P EST MOD 30 MIN: CPT

## 2021-01-31 NOTE — ASSESSMENT
[FreeTextEntry1] : consider further cardiac testing based upon clinical response to dietary maneuvers.

## 2021-01-31 NOTE — REASON FOR VISIT
[Follow-Up - Clinic] : a clinic follow-up of [Chest Pain] : chest pain [Coronary Artery Disease] : coronary artery disease [FreeTextEntry1] : Batool has recurrent atypical symptoms and we would like to discriminate between cardiac and GI symptoms.

## 2021-01-31 NOTE — PHYSICAL EXAM
[General Appearance - Well Developed] : well developed [Normal Appearance] : normal appearance [Well Groomed] : well groomed [General Appearance - Well Nourished] : well nourished [No Deformities] : no deformities [General Appearance - In No Acute Distress] : no acute distress [Normal Conjunctiva] : the conjunctiva exhibited no abnormalities [Eyelids - No Xanthelasma] : the eyelids demonstrated no xanthelasmas [Normal Oral Mucosa] : normal oral mucosa [No Oral Pallor] : no oral pallor [No Oral Cyanosis] : no oral cyanosis [Normal Jugular Venous A Waves Present] : normal jugular venous A waves present [Normal Jugular Venous V Waves Present] : normal jugular venous V waves present [No Jugular Venous Fierro A Waves] : no jugular venous fierro A waves [Respiration, Rhythm And Depth] : normal respiratory rhythm and effort [Auscultation Breath Sounds / Voice Sounds] : lungs were clear to auscultation bilaterally [Exaggerated Use Of Accessory Muscles For Inspiration] : no accessory muscle use [Heart Rate And Rhythm] : heart rate and rhythm were normal [Heart Sounds] : normal S1 and S2 [Murmurs] : no murmurs present [Abdomen Soft] : soft [Abdomen Tenderness] : non-tender [Abdomen Mass (___ Cm)] : no abdominal mass palpated [Abnormal Walk] : normal gait [Gait - Sufficient For Exercise Testing] : the gait was sufficient for exercise testing [Nail Clubbing] : no clubbing of the fingernails [Cyanosis, Localized] : no localized cyanosis [Petechial Hemorrhages (___cm)] : no petechial hemorrhages [Skin Color & Pigmentation] : normal skin color and pigmentation [] : no rash [No Venous Stasis] : no venous stasis [Skin Lesions] : no skin lesions [No Skin Ulcers] : no skin ulcer [No Xanthoma] : no  xanthoma was observed [Oriented To Time, Place, And Person] : oriented to person, place, and time [Affect] : the affect was normal [Mood] : the mood was normal [No Anxiety] : not feeling anxious

## 2021-01-31 NOTE — DISCUSSION/SUMMARY
[Coronary Artery Disease] : coronary artery disease [None] : none [Patient] : the patient [de-identified] : possible GI related symptoms. anti reflux regimen weight reduciton

## 2021-03-08 ENCOUNTER — APPOINTMENT (OUTPATIENT)
Dept: FAMILY MEDICINE | Facility: CLINIC | Age: 71
End: 2021-03-08
Payer: MEDICARE

## 2021-03-08 VITALS
BODY MASS INDEX: 41.54 KG/M2 | OXYGEN SATURATION: 97 % | SYSTOLIC BLOOD PRESSURE: 108 MMHG | HEIGHT: 61 IN | DIASTOLIC BLOOD PRESSURE: 64 MMHG | HEART RATE: 82 BPM | RESPIRATION RATE: 16 BRPM | WEIGHT: 220 LBS | TEMPERATURE: 96.8 F

## 2021-03-08 DIAGNOSIS — F41.9 ANXIETY DISORDER, UNSPECIFIED: ICD-10-CM

## 2021-03-08 PROCEDURE — 99214 OFFICE O/P EST MOD 30 MIN: CPT

## 2021-03-08 RX ORDER — BLOOD-GLUCOSE METER
KIT MISCELLANEOUS
Qty: 1 | Refills: 0 | Status: ACTIVE | COMMUNITY
Start: 2021-03-08 | End: 1900-01-01

## 2021-03-08 RX ORDER — METOPROLOL SUCCINATE 50 MG/1
50 TABLET, EXTENDED RELEASE ORAL
Qty: 90 | Refills: 1 | Status: DISCONTINUED | COMMUNITY
Start: 2020-09-30 | End: 2021-03-08

## 2021-03-08 NOTE — HISTORY OF PRESENT ILLNESS
[FreeTextEntry1] : Presents for f/u CAD, obesity.  Needs med renewal. Feels well, walking daily. Seeing psychiatrist regularly. Medications and counseling helping with her anxiety. States she is not taking metoprolol, doesn't remember when she took it. \par \par ROS: negative except as noted above\par

## 2021-03-08 NOTE — PHYSICAL EXAM
[No Acute Distress] : no acute distress [Well Nourished] : well nourished [Well Developed] : well developed [Well-Appearing] : well-appearing [Normal Voice/Communication] : normal voice/communication [Normal Sclera/Conjunctiva] : normal sclera/conjunctiva [Normal Outer Ear/Nose] : the outer ears and nose were normal in appearance [Normal Oropharynx] : the oropharynx was normal [No Lymphadenopathy] : no lymphadenopathy [Supple] : supple [No Respiratory Distress] : no respiratory distress  [No Accessory Muscle Use] : no accessory muscle use [Clear to Auscultation] : lungs were clear to auscultation bilaterally [Normal Rate] : normal rate  [Regular Rhythm] : with a regular rhythm [Normal S1, S2] : normal S1 and S2 [No Edema] : there was no peripheral edema [Coordination Grossly Intact] : coordination grossly intact [No Focal Deficits] : no focal deficits [Normal Gait] : normal gait [Speech Grossly Normal] : speech grossly normal [Memory Grossly Normal] : memory grossly normal [Normal Affect] : the affect was normal [Alert and Oriented x3] : oriented to person, place, and time [Normal Mood] : the mood was normal [Normal Insight/Judgement] : insight and judgment were intact

## 2021-03-08 NOTE — ASSESSMENT
[FreeTextEntry1] : encouraged to walk more, walk every day especially at noontime\par gradual weight loss recommended\par meds renewed\par RTO 3 months for f/u

## 2021-08-11 NOTE — ED ADULT NURSE NOTE - ANIMAL IMMUNIZATION STATUS
Procedure To Be Performed At Next Visit: Biopsy by shave method Body Location Override (Optional - Billing Will Still Be Based On Selected Body Map Location If Applicable): right lateral superior neck Introduction Text (Please End With A Colon): The following procedure was deferred: shave biopsy. unknown Detail Level: Detailed

## 2021-10-27 ENCOUNTER — NON-APPOINTMENT (OUTPATIENT)
Age: 71
End: 2021-10-27

## 2021-10-27 ENCOUNTER — APPOINTMENT (OUTPATIENT)
Dept: CARDIOLOGY | Facility: CLINIC | Age: 71
End: 2021-10-27
Payer: MEDICARE

## 2021-10-27 VITALS
WEIGHT: 220 LBS | OXYGEN SATURATION: 95 % | TEMPERATURE: 96.8 F | DIASTOLIC BLOOD PRESSURE: 64 MMHG | RESPIRATION RATE: 19 BRPM | HEART RATE: 92 BPM | BODY MASS INDEX: 41.54 KG/M2 | HEIGHT: 61 IN | SYSTOLIC BLOOD PRESSURE: 112 MMHG

## 2021-10-27 PROCEDURE — 93000 ELECTROCARDIOGRAM COMPLETE: CPT

## 2021-10-27 PROCEDURE — 99214 OFFICE O/P EST MOD 30 MIN: CPT

## 2021-10-28 NOTE — REASON FOR VISIT
[CV Risk Factors and Non-Cardiac Disease] : CV risk factors and non-cardiac disease [Arrhythmia/ECG Abnorrmalities] : arrhythmia/ECG abnormalities [FreeTextEntry3] : Dr Julissa Staples [FreeTextEntry1] : Batool was told by dr Staples fo follow up with cardiology I we have checked the QT interval today and it is satisfactory routine blood work is planned and she will update lipids at that time QTC [id number] down from 424 has some typical dyspnea on exertion no worse than previously

## 2021-10-28 NOTE — ASSESSMENT
[FreeTextEntry1] : I suspect a component every dyspnea may be on the basis of Brilinta and would consider for discontinuation after the review of lipids\par \par Medical necessity this is a high risk encounter for two or more chronic illnesses which require management of the high risk medication with associated symptoms of dyspnea and a differential\par \par EKG indicated for coronary artery disease

## 2021-11-04 ENCOUNTER — RX RENEWAL (OUTPATIENT)
Age: 71
End: 2021-11-04

## 2021-11-10 ENCOUNTER — APPOINTMENT (OUTPATIENT)
Dept: FAMILY MEDICINE | Facility: CLINIC | Age: 71
End: 2021-11-10
Payer: MEDICARE

## 2021-11-10 VITALS
TEMPERATURE: 97.4 F | SYSTOLIC BLOOD PRESSURE: 110 MMHG | RESPIRATION RATE: 18 BRPM | BODY MASS INDEX: 41.91 KG/M2 | HEART RATE: 88 BPM | WEIGHT: 222 LBS | HEIGHT: 61 IN | OXYGEN SATURATION: 95 % | DIASTOLIC BLOOD PRESSURE: 72 MMHG

## 2021-11-10 DIAGNOSIS — Z12.39 ENCOUNTER FOR OTHER SCREENING FOR MALIGNANT NEOPLASM OF BREAST: ICD-10-CM

## 2021-11-10 LAB
ALBUMIN SERPL ELPH-MCNC: 4.5 G/DL
ALP BLD-CCNC: 110 U/L
ALT SERPL-CCNC: 11 U/L
ANION GAP SERPL CALC-SCNC: 14 MMOL/L
AST SERPL-CCNC: 14 U/L
BASOPHILS # BLD AUTO: 0.05 K/UL
BASOPHILS NFR BLD AUTO: 0.6 %
BILIRUB SERPL-MCNC: 0.3 MG/DL
BUN SERPL-MCNC: 11 MG/DL
CALCIUM SERPL-MCNC: 9.6 MG/DL
CHLORIDE SERPL-SCNC: 105 MMOL/L
CHOLEST SERPL-MCNC: 152 MG/DL
CO2 SERPL-SCNC: 22 MMOL/L
CREAT SERPL-MCNC: 0.71 MG/DL
EOSINOPHIL # BLD AUTO: 0.19 K/UL
EOSINOPHIL NFR BLD AUTO: 2.4 %
ESTIMATED AVERAGE GLUCOSE: 117 MG/DL
GLUCOSE SERPL-MCNC: 118 MG/DL
HBA1C MFR BLD HPLC: 5.7 %
HCT VFR BLD CALC: 42.2 %
HDLC SERPL-MCNC: 36 MG/DL
HGB BLD-MCNC: 14 G/DL
IMM GRANULOCYTES NFR BLD AUTO: 0.4 %
LDLC SERPL CALC-MCNC: 78 MG/DL
LYMPHOCYTES # BLD AUTO: 1.97 K/UL
LYMPHOCYTES NFR BLD AUTO: 25.1 %
MAN DIFF?: NORMAL
MCHC RBC-ENTMCNC: 28.7 PG
MCHC RBC-ENTMCNC: 33.2 GM/DL
MCV RBC AUTO: 86.5 FL
MONOCYTES # BLD AUTO: 0.61 K/UL
MONOCYTES NFR BLD AUTO: 7.8 %
NEUTROPHILS # BLD AUTO: 4.99 K/UL
NEUTROPHILS NFR BLD AUTO: 63.7 %
NONHDLC SERPL-MCNC: 116 MG/DL
PLATELET # BLD AUTO: 275 K/UL
POTASSIUM SERPL-SCNC: 4.5 MMOL/L
PROT SERPL-MCNC: 6.6 G/DL
RBC # BLD: 4.88 M/UL
RBC # FLD: 12.1 %
SODIUM SERPL-SCNC: 141 MMOL/L
TRIGL SERPL-MCNC: 191 MG/DL
TSH SERPL-ACNC: 2.46 UIU/ML
WBC # FLD AUTO: 7.84 K/UL

## 2021-11-10 PROCEDURE — G0439: CPT

## 2021-11-10 RX ORDER — TICAGRELOR 90 MG/1
90 TABLET ORAL
Qty: 180 | Refills: 0 | Status: DISCONTINUED | COMMUNITY
Start: 2017-11-27 | End: 2021-11-10

## 2021-11-10 NOTE — HISTORY OF PRESENT ILLNESS
[FreeTextEntry1] : Presents for CPE. Feels well. Does exercise classes at senior center in [place]. Sees Dr. Staples,psychiatrist for medication management, and sees therapist via zoom once weekly.  Daughter moved to [place] recently with 2 grandkids, son lives in [place].\par \par declines further PAPs, states her last one was normal, RBA discussed\par \par

## 2021-11-10 NOTE — PHYSICAL EXAM
"Returned patients phone call to address [place] message. BP remains elevated on norvasc 5mg, will increase to 10mg po daily. Pt instructed to continue taking BP log. Pt states no improvement in her rash since starting antibiotic, but it has not worsened. Pt informed that I don't believe she has been on the medication long enough and to continue taking antibiotic. Pain reports she continues to have "deep" pain behind her right breast. There is no redness or tenderness to palpation of the actual breast. Pt states pain is made worse by right arm movement or coughing. Pt encouraged to continue Naprosyn but will add Flexeril 3 times daily.  Possibility of sedation warning advised. Discussed with her that based on her description if the pain, I believe it to be musculoskeletal in nature. If Flexeril and Naprosyn give no relief, we may need to get imaging to evaluate. Pt is in agreement. She will keep me updated. Patient thanked NP for calling.  " [No Acute Distress] : no acute distress [Well Nourished] : well nourished [Well Developed] : well developed [Well-Appearing] : well-appearing [Normal Voice/Communication] : normal voice/communication [Normal Sclera/Conjunctiva] : normal sclera/conjunctiva [PERRL] : pupils equal round and reactive to light [EOMI] : extraocular movements intact [Normal Outer Ear/Nose] : the outer ears and nose were normal in appearance [Normal Oropharynx] : the oropharynx was normal [Normal TMs] : both tympanic membranes were normal [No JVD] : no jugular venous distention [No Lymphadenopathy] : no lymphadenopathy [Supple] : supple [Thyroid Normal, No Nodules] : the thyroid was normal and there were no nodules present [No Respiratory Distress] : no respiratory distress  [No Accessory Muscle Use] : no accessory muscle use [Clear to Auscultation] : lungs were clear to auscultation bilaterally [Normal Rate] : normal rate  [Regular Rhythm] : with a regular rhythm [Normal S1, S2] : normal S1 and S2 [Pedal Pulses Present] : the pedal pulses are present [No Edema] : there was no peripheral edema [No Extremity Clubbing/Cyanosis] : no extremity clubbing/cyanosis [Soft] : abdomen soft [Non Tender] : non-tender [Non-distended] : non-distended [No Masses] : no abdominal mass palpated [No HSM] : no HSM [Normal Supraclavicular Nodes] : no supraclavicular lymphadenopathy [Normal Anterior Cervical Nodes] : no anterior cervical lymphadenopathy [No Joint Swelling] : no joint swelling [Grossly Normal Strength/Tone] : grossly normal strength/tone [No Rash] : no rash [Coordination Grossly Intact] : coordination grossly intact [No Focal Deficits] : no focal deficits [Normal Gait] : normal gait [Speech Grossly Normal] : speech grossly normal [Memory Grossly Normal] : memory grossly normal [Normal Affect] : the affect was normal [Alert and Oriented x3] : oriented to person, place, and time [Normal Mood] : the mood was normal [Normal Insight/Judgement] : insight and judgment were intact

## 2021-11-10 NOTE — REVIEW OF SYSTEMS
[Recent Change In Weight] : ~T recent weight change [Nocturia] : nocturia [Joint Pain] : joint pain [Back Pain] : back pain [Itching] : Itching [Insomnia] : insomnia [Anxiety] : anxiety [Depression] : depression [Fever] : no fever [Chills] : no chills [Fatigue] : no fatigue [Hot Flashes] : no hot flashes [Night Sweats] : no night sweats [Discharge] : no discharge [Pain] : no pain [Redness] : no redness [Dryness] : no dryness  [Vision Problems] : no vision problems [Itching] : no itching [Earache] : no earache [Hearing Loss] : no hearing loss [Nosebleed] : no nosebleeds [Hoarseness] : no hoarseness [Nasal Discharge] : no nasal discharge [Sore Throat] : no sore throat [Postnasal Drip] : no postnasal drip [Chest Pain] : no chest pain [Palpitations] : no palpitations [Leg Claudication] : no leg claudication [Lower Ext Edema] : no lower extremity edema [Shortness Of Breath] : no shortness of breath [Wheezing] : no wheezing [Cough] : no cough [Dyspnea on Exertion] : no dyspnea on exertion [Abdominal Pain] : no abdominal pain [Nausea] : no nausea [Constipation] : no constipation [Diarrhea] : diarrhea [Vomiting] : no vomiting [Heartburn] : no heartburn [Melena] : no melena [Dysuria] : no dysuria [Incontinence] : no incontinence [Hematuria] : no hematuria [Frequency] : no frequency [Joint Stiffness] : no joint stiffness [Joint Swelling] : no joint swelling [Muscle Weakness] : no muscle weakness [Muscle Pain] : no muscle pain [Mole Changes] : no mole changes [Nail Changes] : no nail changes [Hair Changes] : no hair changes [Skin Rash] : no skin rash [Headache] : no headache [Dizziness] : no dizziness [Fainting] : no fainting [Confusion] : no confusion [Memory Loss] : no memory loss [Suicidal] : not suicidal [Easy Bleeding] : no easy bleeding [Easy Bruising] : no easy bruising [Swollen Glands] : no swollen glands [FreeTextEntry2] : recent weight gain, dtr moved to [place] 1 month ago [FreeTextEntry3] : needs ophtho [FreeTextEntry8] : 1x nocturia . Wakes up every 2 hours, unsure why.  [FreeTextEntry9] : left shoulder pain, right knee crepitus [de-identified] : dry skin

## 2021-11-11 ENCOUNTER — NON-APPOINTMENT (OUTPATIENT)
Age: 71
End: 2021-11-11

## 2022-01-18 ENCOUNTER — NON-APPOINTMENT (OUTPATIENT)
Age: 72
End: 2022-01-18

## 2022-01-21 ENCOUNTER — NON-APPOINTMENT (OUTPATIENT)
Age: 72
End: 2022-01-21

## 2022-01-21 ENCOUNTER — APPOINTMENT (OUTPATIENT)
Dept: PULMONOLOGY | Facility: CLINIC | Age: 72
End: 2022-01-21
Payer: MEDICARE

## 2022-01-21 VITALS — HEIGHT: 62 IN | BODY MASS INDEX: 33.13 KG/M2 | WEIGHT: 180 LBS

## 2022-01-21 PROCEDURE — G0296 VISIT TO DETERM LDCT ELIG: CPT

## 2022-01-26 NOTE — PLAN
[FreeTextEntry1] : \par Plan:\par -Low Dose CT chest for lung cancer screening\par -Follow up with her referring provider after her LDCT for results\par -Encouraged continued smoking abstinence\par -Encouraged pt to stop vapping\par \par

## 2022-01-26 NOTE — HISTORY OF PRESENT ILLNESS
[TextBox_13] : \par Referred by Dr. Costa.\par \par Ms. NATHANIEL BRAVO  is a 71 year old woman with a history of tobacco dependence. \par \par She  was seen in the office by Dr. Costa for review of eligibility for, as well as, discussion of Low-Dose CT lung cancer screening program. Over the telephone today we reviewed and confirmed that the patient meets screening eligibility criteria:\par -Age: 71 year \par Smoking status:\par -Former smoker\par -Number of pack(s) per day: 1\par -Number of years smoked: 47\par -Number of pack years smokin\par -Number of years since quitting smoking: 3\par -Pt currently vapping\par \par Ms. BRAVO denies any signs or symptoms of lung cancer including new cough, change in cough, hemoptysis and unintentional weight loss. \par \par Ms. BRAVO denies any personal history of lung cancer. Pt mother was dx with lung CA. Denies any history of lung disease. Denies any history of occupational exposures\par \par

## 2022-02-08 ENCOUNTER — APPOINTMENT (OUTPATIENT)
Dept: MAMMOGRAPHY | Facility: HOSPITAL | Age: 72
End: 2022-02-08
Payer: MEDICARE

## 2022-02-08 ENCOUNTER — APPOINTMENT (OUTPATIENT)
Dept: CT IMAGING | Facility: HOSPITAL | Age: 72
End: 2022-02-08
Payer: MEDICARE

## 2022-02-08 ENCOUNTER — RESULT REVIEW (OUTPATIENT)
Age: 72
End: 2022-02-08

## 2022-02-08 ENCOUNTER — OUTPATIENT (OUTPATIENT)
Dept: OUTPATIENT SERVICES | Facility: HOSPITAL | Age: 72
LOS: 1 days | End: 2022-02-08
Payer: MEDICARE

## 2022-02-08 DIAGNOSIS — Z87.891 PERSONAL HISTORY OF NICOTINE DEPENDENCE: ICD-10-CM

## 2022-02-08 DIAGNOSIS — Z90.89 ACQUIRED ABSENCE OF OTHER ORGANS: Chronic | ICD-10-CM

## 2022-02-08 DIAGNOSIS — Z98.890 OTHER SPECIFIED POSTPROCEDURAL STATES: Chronic | ICD-10-CM

## 2022-02-08 DIAGNOSIS — Z96.649 PRESENCE OF UNSPECIFIED ARTIFICIAL HIP JOINT: Chronic | ICD-10-CM

## 2022-02-08 PROCEDURE — 77063 BREAST TOMOSYNTHESIS BI: CPT

## 2022-02-08 PROCEDURE — 77067 SCR MAMMO BI INCL CAD: CPT | Mod: 26

## 2022-02-08 PROCEDURE — 71271 CT THORAX LUNG CANCER SCR C-: CPT | Mod: 26,MH

## 2022-02-08 PROCEDURE — 77063 BREAST TOMOSYNTHESIS BI: CPT | Mod: 26

## 2022-02-08 PROCEDURE — 71271 CT THORAX LUNG CANCER SCR C-: CPT | Mod: MH

## 2022-02-08 PROCEDURE — 77067 SCR MAMMO BI INCL CAD: CPT

## 2022-02-09 ENCOUNTER — APPOINTMENT (OUTPATIENT)
Dept: FAMILY MEDICINE | Facility: CLINIC | Age: 72
End: 2022-02-09
Payer: MEDICARE

## 2022-02-09 VITALS
TEMPERATURE: 98.6 F | HEART RATE: 98 BPM | RESPIRATION RATE: 18 BRPM | WEIGHT: 231 LBS | HEIGHT: 62 IN | DIASTOLIC BLOOD PRESSURE: 70 MMHG | BODY MASS INDEX: 42.51 KG/M2 | OXYGEN SATURATION: 95 % | SYSTOLIC BLOOD PRESSURE: 110 MMHG

## 2022-02-09 DIAGNOSIS — E66.9 OBESITY, UNSPECIFIED: ICD-10-CM

## 2022-02-09 PROCEDURE — 99214 OFFICE O/P EST MOD 30 MIN: CPT

## 2022-02-09 NOTE — HISTORY OF PRESENT ILLNESS
[FreeTextEntry1] : Batool presents for f/u CAD, GERD, needs renewal of meds for heart and protonix for acid. States sometimes needs tums additionally depending on what she eats. Taking her cholesterol medicine and other meds daily as prescribed. Hopes to visit her daughter in [place] once weather is warmer. Still following with psychiatry re: medications for bipolar depression, still having hard time sleeping, waking every 2 hours at night. \par \par ROS: negative except as noted above\par

## 2022-02-09 NOTE — PHYSICAL EXAM
[No Acute Distress] : no acute distress [Well Nourished] : well nourished [Well Developed] : well developed [Well-Appearing] : well-appearing [Normal Voice/Communication] : normal voice/communication [Normal Sclera/Conjunctiva] : normal sclera/conjunctiva [Normal Outer Ear/Nose] : the outer ears and nose were normal in appearance [Normal Oropharynx] : the oropharynx was normal [No Lymphadenopathy] : no lymphadenopathy [Supple] : supple [No Respiratory Distress] : no respiratory distress  [No Accessory Muscle Use] : no accessory muscle use [Clear to Auscultation] : lungs were clear to auscultation bilaterally [Normal Rate] : normal rate  [Regular Rhythm] : with a regular rhythm [Normal S1, S2] : normal S1 and S2 [No Edema] : there was no peripheral edema [Normal Supraclavicular Nodes] : no supraclavicular lymphadenopathy [Normal Anterior Cervical Nodes] : no anterior cervical lymphadenopathy [Coordination Grossly Intact] : coordination grossly intact [No Focal Deficits] : no focal deficits [Speech Grossly Normal] : speech grossly normal [Memory Grossly Normal] : memory grossly normal [Normal Affect] : the affect was normal [Alert and Oriented x3] : oriented to person, place, and time [Normal Mood] : the mood was normal [Normal Insight/Judgement] : insight and judgment were intact [PERRL] : pupils equal round and reactive to light [EOMI] : extraocular movements intact

## 2022-03-01 ENCOUNTER — NON-APPOINTMENT (OUTPATIENT)
Age: 72
End: 2022-03-01

## 2022-04-23 NOTE — ED ADULT NURSE NOTE - CAS EDP DISCH TYPE
RT Inhaler-Nebulizer Bronchodilator Protocol Note    There is a bronchodilator order in the chart from a provider indicating to follow the RT Bronchodilator Protocol and there is an Initiate RT Inhaler-Nebulizer Bronchodilator Protocol order as well (see protocol at bottom of note). CXR Findings:  No results found. The findings from the last RT Protocol Assessment were as follows:   History Pulmonary Disease: None or smoker <15 pack years  Respiratory Pattern: Dyspnea on exertion or RR 21-25 bpm  Breath Sounds: Slightly diminished and/or crackles  Cough: Strong, spontaneous, non-productive  Indication for Bronchodilator Therapy: Decreased or absent breath sounds,On home bronchodilators  Bronchodilator Assessment Score: 4    Aerosolized bronchodilator medication orders have been revised according to the RT Inhaler-Nebulizer Bronchodilator Protocol below. Respiratory Therapist to perform RT Therapy Protocol Assessment initially then follow the protocol. Repeat RT Therapy Protocol Assessment PRN for score 0-3 or on second treatment, BID, and PRN for scores above 3. No Indications - adjust the frequency to every 6 hours PRN wheezing or bronchospasm, if no treatments needed after 48 hours then discontinue using Per Protocol order mode. If indication present, adjust the RT bronchodilator orders based on the Bronchodilator Assessment Score as indicated below. Use Inhaler orders unless patient has one or more of the following: on home nebulizer, not able to hold breath for 10 seconds, is not alert and oriented, cannot activate and use MDI correctly, or respiratory rate 25 breaths per minute or more, then use the equivalent nebulizer order(s) with same Frequency and PRN reasons based on the score. If a patient is on this medication at home then do not decrease Frequency below that used at home.     0-3 - enter or revise RT bronchodilator order(s) to equivalent RT Bronchodilator order with Frequency of every 4 hours PRN for wheezing or increased work of breathing using Per Protocol order mode. 4-6 - enter or revise RT Bronchodilator order(s) to two equivalent RT bronchodilator orders with one order with BID Frequency and one order with Frequency of every 4 hours PRN wheezing or increased work of breathing using Per Protocol order mode. 7-10 - enter or revise RT Bronchodilator order(s) to two equivalent RT bronchodilator orders with one order with TID Frequency and one order with Frequency of every 4 hours PRN wheezing or increased work of breathing using Per Protocol order mode. 11-13 - enter or revise RT Bronchodilator order(s) to one equivalent RT bronchodilator order with QID Frequency and an Albuterol order with Frequency of every 4 hours PRN wheezing or increased work of breathing using Per Protocol order mode. Greater than 13 - enter or revise RT Bronchodilator order(s) to one equivalent RT bronchodilator order with every 4 hours Frequency and an Albuterol order with Frequency of every 2 hours PRN wheezing or increased work of breathing using Per Protocol order mode. RT to enter RT Home Evaluation for COPD & MDI Assessment order using Per Protocol order mode.     Electronically signed by Grace Mon RCP on 4/23/2022 at 7:55 AM Home

## 2022-04-26 ENCOUNTER — RX RENEWAL (OUTPATIENT)
Age: 72
End: 2022-04-26

## 2022-10-17 ENCOUNTER — RX RENEWAL (OUTPATIENT)
Age: 72
End: 2022-10-17

## 2022-12-04 ENCOUNTER — RX RENEWAL (OUTPATIENT)
Age: 72
End: 2022-12-04

## 2022-12-29 ENCOUNTER — APPOINTMENT (OUTPATIENT)
Dept: FAMILY MEDICINE | Facility: CLINIC | Age: 72
End: 2022-12-29
Payer: MEDICARE

## 2022-12-29 PROCEDURE — 99213 OFFICE O/P EST LOW 20 MIN: CPT | Mod: 95

## 2022-12-29 RX ORDER — PREGABALIN 100 MG/1
100 CAPSULE ORAL TWICE DAILY
Qty: 20 | Refills: 0 | Status: COMPLETED | COMMUNITY
Start: 2020-10-13 | End: 2022-12-29

## 2022-12-29 RX ORDER — PREGABALIN 100 MG/1
100 CAPSULE ORAL TWICE DAILY
Qty: 20 | Refills: 0 | Status: COMPLETED | COMMUNITY
Start: 2020-09-25 | End: 2022-12-29

## 2022-12-29 RX ORDER — MUPIROCIN 20 MG/G
2 OINTMENT TOPICAL
Qty: 1 | Refills: 0 | Status: COMPLETED | COMMUNITY
Start: 2020-05-27 | End: 2022-12-29

## 2022-12-29 RX ORDER — AMOXICILLIN 500 MG/1
500 TABLET, FILM COATED ORAL
Qty: 4 | Refills: 0 | Status: COMPLETED | COMMUNITY
Start: 2022-01-11 | End: 2022-12-29

## 2022-12-29 RX ORDER — CELECOXIB 100 MG/1
100 CAPSULE ORAL TWICE DAILY
Qty: 40 | Refills: 0 | Status: COMPLETED | COMMUNITY
Start: 2020-02-28 | End: 2022-12-29

## 2022-12-29 RX ORDER — TOBRAMYCIN 3 MG/ML
0.3 SOLUTION/ DROPS OPHTHALMIC
Qty: 1 | Refills: 0 | Status: COMPLETED | COMMUNITY
Start: 2020-05-27 | End: 2022-12-29

## 2022-12-29 NOTE — REVIEW OF SYSTEMS
[Nasal Discharge] : nasal discharge [Cough] : cough [Negative] : Gastrointestinal [Earache] : no earache [Shortness Of Breath] : no shortness of breath [Wheezing] : no wheezing

## 2022-12-29 NOTE — HISTORY OF PRESENT ILLNESS
[Home] : at home, [unfilled] , at the time of the visit. [Medical Office: ([facility])___] : at the medical office located in  [Verbal consent obtained from patient] : the patient, [unfilled] [FreeTextEntry8] : 73 yo F presenting today via tele visit for cough. \par Her symptoms started on 12/22, they include cough/nasal congestion. \par Denies fever, sore throat, SOB or any other symptoms.  \par States that she was in [place] last week prior to her symptoms, a family member did have rhinorrhea at that time. \par She did not test for COVID.

## 2022-12-29 NOTE — PLAN
[FreeTextEntry1] : #Cough/nasal congestion \par -likely post viral given patient denies fevers/has known sick contact \par -offered Swab to be sent to [facility] lab for COVID/Flu/RSV - patient defers, symptoms did start about 7 days ago \par -trial of symptomatic treatment - benzonatate sent to pharmacy, discussed Flonase for nasal congestion \par -advised if any worsening symptoms such as fevers/or symptoms do not improve to follow-up with primary care/urgent care for in person exam and consideration of imaging if needed

## 2022-12-29 NOTE — PHYSICAL EXAM
[de-identified] : limited televideo converted to telephone as patient with difficulty acessing video

## 2023-04-13 ENCOUNTER — RX RENEWAL (OUTPATIENT)
Age: 73
End: 2023-04-13

## 2023-04-20 ENCOUNTER — RX RENEWAL (OUTPATIENT)
Age: 73
End: 2023-04-20

## 2023-05-14 ENCOUNTER — RX RENEWAL (OUTPATIENT)
Age: 73
End: 2023-05-14

## 2023-05-16 ENCOUNTER — APPOINTMENT (OUTPATIENT)
Dept: CARDIOLOGY | Facility: CLINIC | Age: 73
End: 2023-05-16
Payer: MEDICARE

## 2023-05-16 VITALS
WEIGHT: 241 LBS | SYSTOLIC BLOOD PRESSURE: 115 MMHG | BODY MASS INDEX: 44.35 KG/M2 | DIASTOLIC BLOOD PRESSURE: 72 MMHG | HEIGHT: 62 IN | RESPIRATION RATE: 17 BRPM | HEART RATE: 87 BPM | OXYGEN SATURATION: 94 %

## 2023-05-16 DIAGNOSIS — I65.23 OCCLUSION AND STENOSIS OF BILATERAL CAROTID ARTERIES: ICD-10-CM

## 2023-05-16 PROCEDURE — 99214 OFFICE O/P EST MOD 30 MIN: CPT

## 2023-05-16 PROCEDURE — 93000 ELECTROCARDIOGRAM COMPLETE: CPT

## 2023-05-20 NOTE — PHYSICAL EXAM
[General Appearance - Well Developed] : well developed [Normal Appearance] : normal appearance [Well Groomed] : well groomed [General Appearance - Well Nourished] : well nourished [No Deformities] : no deformities [General Appearance - In No Acute Distress] : no acute distress [Normal Conjunctiva] : the conjunctiva exhibited no abnormalities [Eyelids - No Xanthelasma] : the eyelids demonstrated no xanthelasmas [Normal Oral Mucosa] : normal oral mucosa [No Oral Pallor] : no oral pallor [No Oral Cyanosis] : no oral cyanosis [Normal Jugular Venous A Waves Present] : normal jugular venous A waves present [Normal Jugular Venous V Waves Present] : normal jugular venous V waves present [No Jugular Venous Fierro A Waves] : no jugular venous fierro A waves [Respiration, Rhythm And Depth] : normal respiratory rhythm and effort [Exaggerated Use Of Accessory Muscles For Inspiration] : no accessory muscle use [Auscultation Breath Sounds / Voice Sounds] : lungs were clear to auscultation bilaterally [Heart Rate And Rhythm] : heart rate and rhythm were normal [Heart Sounds] : normal S1 and S2 [Murmurs] : no murmurs present [Abdomen Soft] : soft [Abdomen Tenderness] : non-tender [Abdomen Mass (___ Cm)] : no abdominal mass palpated [Abnormal Walk] : normal gait [Gait - Sufficient For Exercise Testing] : the gait was sufficient for exercise testing [Nail Clubbing] : no clubbing of the fingernails [Petechial Hemorrhages (___cm)] : no petechial hemorrhages [Cyanosis, Localized] : no localized cyanosis [Skin Color & Pigmentation] : normal skin color and pigmentation [] : no rash [No Venous Stasis] : no venous stasis [Skin Lesions] : no skin lesions [No Skin Ulcers] : no skin ulcer [No Xanthoma] : no  xanthoma was observed [Oriented To Time, Place, And Person] : oriented to person, place, and time [Affect] : the affect was normal [Mood] : the mood was normal [No Anxiety] : not feeling anxious

## 2023-05-24 ENCOUNTER — APPOINTMENT (OUTPATIENT)
Dept: FAMILY MEDICINE | Facility: CLINIC | Age: 73
End: 2023-05-24

## 2023-06-01 ENCOUNTER — NON-APPOINTMENT (OUTPATIENT)
Age: 73
End: 2023-06-01

## 2023-06-14 ENCOUNTER — APPOINTMENT (OUTPATIENT)
Dept: FAMILY MEDICINE | Facility: CLINIC | Age: 73
End: 2023-06-14
Payer: MEDICARE

## 2023-06-14 ENCOUNTER — OUTPATIENT (OUTPATIENT)
Dept: OUTPATIENT SERVICES | Facility: HOSPITAL | Age: 73
LOS: 1 days | End: 2023-06-14
Payer: MEDICARE

## 2023-06-14 VITALS
TEMPERATURE: 97.9 F | SYSTOLIC BLOOD PRESSURE: 105 MMHG | OXYGEN SATURATION: 94 % | DIASTOLIC BLOOD PRESSURE: 67 MMHG | RESPIRATION RATE: 16 BRPM | BODY MASS INDEX: 43.17 KG/M2 | WEIGHT: 236 LBS | HEART RATE: 90 BPM

## 2023-06-14 DIAGNOSIS — R53.83 OTHER FATIGUE: ICD-10-CM

## 2023-06-14 DIAGNOSIS — Z12.2 ENCOUNTER FOR SCREENING FOR MALIGNANT NEOPLASM OF RESPIRATORY ORGANS: ICD-10-CM

## 2023-06-14 DIAGNOSIS — Z00.00 ENCOUNTER FOR GENERAL ADULT MEDICAL EXAMINATION WITHOUT ABNORMAL FINDINGS: ICD-10-CM

## 2023-06-14 DIAGNOSIS — R10.31 RIGHT LOWER QUADRANT PAIN: ICD-10-CM

## 2023-06-14 DIAGNOSIS — Z90.89 ACQUIRED ABSENCE OF OTHER ORGANS: Chronic | ICD-10-CM

## 2023-06-14 DIAGNOSIS — Z12.11 ENCOUNTER FOR SCREENING FOR MALIGNANT NEOPLASM OF COLON: ICD-10-CM

## 2023-06-14 DIAGNOSIS — Z96.649 PRESENCE OF UNSPECIFIED ARTIFICIAL HIP JOINT: Chronic | ICD-10-CM

## 2023-06-14 DIAGNOSIS — Z00.00 ENCOUNTER FOR GENERAL ADULT MEDICAL EXAMINATION W/OUT ABNORMAL FINDINGS: ICD-10-CM

## 2023-06-14 PROCEDURE — 73502 X-RAY EXAM HIP UNI 2-3 VIEWS: CPT

## 2023-06-14 PROCEDURE — G0439: CPT

## 2023-06-14 PROCEDURE — 73502 X-RAY EXAM HIP UNI 2-3 VIEWS: CPT | Mod: 26,RT

## 2023-06-14 NOTE — REVIEW OF SYSTEMS
[Vision Problems] : vision problems [Joint Pain] : joint pain [Fever] : no fever [Chills] : no chills [Fatigue] : no fatigue [Hot Flashes] : no hot flashes [Night Sweats] : no night sweats [Recent Change In Weight] : ~T no recent weight change [Discharge] : no discharge [Pain] : no pain [Redness] : no redness [Dryness] : no dryness  [Itching] : no itching [Earache] : no earache [Hearing Loss] : no hearing loss [Nosebleed] : no nosebleeds [Hoarseness] : no hoarseness [Nasal Discharge] : no nasal discharge [Sore Throat] : no sore throat [Postnasal Drip] : no postnasal drip [Chest Pain] : no chest pain [Palpitations] : no palpitations [Leg Claudication] : no leg claudication [Lower Ext Edema] : no lower extremity edema [Shortness Of Breath] : no shortness of breath [Wheezing] : no wheezing [Cough] : no cough [Dyspnea on Exertion] : no dyspnea on exertion [Abdominal Pain] : no abdominal pain [Nausea] : no nausea [Constipation] : no constipation [Diarrhea] : diarrhea [Vomiting] : no vomiting [Heartburn] : no heartburn [Melena] : no melena [Dysuria] : no dysuria [Incontinence] : no incontinence [Nocturia] : no nocturia [Hematuria] : no hematuria [Frequency] : no frequency [Joint Stiffness] : no joint stiffness [Joint Swelling] : no joint swelling [Muscle Weakness] : no muscle weakness [Muscle Pain] : no muscle pain [Back Pain] : no back pain [Itching] : no itching [Mole Changes] : no mole changes [Nail Changes] : no nail changes [Hair Changes] : no hair changes [Skin Rash] : no skin rash [Headache] : no headache [Dizziness] : no dizziness [Fainting] : no fainting [Confusion] : no confusion [Memory Loss] : no memory loss [Suicidal] : not suicidal [Insomnia] : insomnia [Depression] : depression [Anxiety] : anxiety [Easy Bleeding] : no easy bleeding [Easy Bruising] : no easy bruising [Swollen Glands] : no swollen glands [FreeTextEntry3] : needs new reading glasses [FreeTextEntry7] : never had colonoscopy [FreeTextEntry8] : no pmb [FreeTextEntry9] : right groin pain x 3-4 months

## 2023-06-14 NOTE — PHYSICAL EXAM
[No Acute Distress] : no acute distress [Well Nourished] : well nourished [Well Developed] : well developed [Well-Appearing] : well-appearing [Normal Voice/Communication] : normal voice/communication [Normal Sclera/Conjunctiva] : normal sclera/conjunctiva [PERRL] : pupils equal round and reactive to light [EOMI] : extraocular movements intact [Normal Outer Ear/Nose] : the outer ears and nose were normal in appearance [Normal Oropharynx] : the oropharynx was normal [Normal TMs] : both tympanic membranes were normal [No JVD] : no jugular venous distention [No Lymphadenopathy] : no lymphadenopathy [Supple] : supple [Thyroid Normal, No Nodules] : the thyroid was normal and there were no nodules present [No Respiratory Distress] : no respiratory distress  [No Accessory Muscle Use] : no accessory muscle use [Clear to Auscultation] : lungs were clear to auscultation bilaterally [Normal Rate] : normal rate  [Regular Rhythm] : with a regular rhythm [Normal S1, S2] : normal S1 and S2 [Pedal Pulses Present] : the pedal pulses are present [No Edema] : there was no peripheral edema [No Extremity Clubbing/Cyanosis] : no extremity clubbing/cyanosis [Normal Appearance] : normal in appearance [No Nipple Discharge] : no nipple discharge [No Axillary Lymphadenopathy] : no axillary lymphadenopathy [Soft] : abdomen soft [Non Tender] : non-tender [Non-distended] : non-distended [No Masses] : no abdominal mass palpated [No HSM] : no HSM [Normal Supraclavicular Nodes] : no supraclavicular lymphadenopathy [Normal Anterior Cervical Nodes] : no anterior cervical lymphadenopathy [No CVA Tenderness] : no CVA  tenderness [No Spinal Tenderness] : no spinal tenderness [No Rash] : no rash [Coordination Grossly Intact] : coordination grossly intact [No Focal Deficits] : no focal deficits [Normal Gait] : normal gait [Speech Grossly Normal] : speech grossly normal [Memory Grossly Normal] : memory grossly normal [Normal Affect] : the affect was normal [Alert and Oriented x3] : oriented to person, place, and time [Normal Mood] : the mood was normal [Normal Insight/Judgement] : insight and judgment were intact

## 2023-06-14 NOTE — HISTORY OF PRESENT ILLNESS
[FreeTextEntry1] : Presents for CPE. Admits has been gaining weight recently. Recently started naltrexone and wellbutrin to combat weight gain, psychiatrist Dr. Staples. \par \par meds: added also trazodone for sleep

## 2023-06-18 ENCOUNTER — NON-APPOINTMENT (OUTPATIENT)
Age: 73
End: 2023-06-18

## 2023-06-18 NOTE — ASSESSMENT
[FreeTextEntry1] : I suspect a component every dyspnea may be on the basis of Brilinta and would consider for discontinuation after the review of lipids\par \par 5/16/23\par continue statin rosuva 20 continue  aspirin therapy for prior cardiac stenting . remains on clopidogrel given advanced CAD although this may be considered for discontinuation >1 year since a cardiac stent. will check carotids for peripheral arterial indications for dapt. qtc satisfactory . \par \par Medical necessity this is a high risk encounter for two or more chronic illnesses which require management of the high risk medications  anticoagulants\par \par EKG indicated for coronary artery disease

## 2023-06-18 NOTE — REASON FOR VISIT
[CV Risk Factors and Non-Cardiac Disease] : CV risk factors and non-cardiac disease [Arrhythmia/ECG Abnorrmalities] : arrhythmia/ECG abnormalities [FreeTextEntry3] : Dr Julissa Staples [FreeTextEntry1] : Batool was told by dr Staples fo follow up with cardiology I we have checked the QT interval today and it is satisfactory routine blood work is planned and she will update lipids at that time /407 down from 424 has some typical dyspnea on exertion no worse than previously\par \par 5/16/23\par symptoms unchanged relatively asymptomatic. LDL 58 . qtc 401 [place] risk low . \par

## 2023-06-18 NOTE — DISCUSSION/SUMMARY
[FreeTextEntry1] : To:     Dr. Cheng\par From  Dr. Beach \par \par Ms. Morse may hold clopidogrel for 2 days prior to planned dental work and should be resumed when possible from a dental standpoint. Aspirin 81 mg will be maintained throughout the perioperative period and even on the day of dental surgery given an indwelling cardiac stent. Please not hesitate to contact me if I be of further assistance in the care of this pleasant patient.\par \par Sincerely,\par Avelino Beach MD FAC\par \par cc Dr Costa [EKG obtained to assist in diagnosis and management of assessed problem(s)] : EKG obtained to assist in diagnosis and management of assessed problem(s)

## 2023-06-19 ENCOUNTER — NON-APPOINTMENT (OUTPATIENT)
Age: 73
End: 2023-06-19

## 2023-06-19 LAB
25(OH)D3 SERPL-MCNC: 24.7 NG/ML
ALBUMIN SERPL ELPH-MCNC: 4.6 G/DL
ALP BLD-CCNC: 131 U/L
ALT SERPL-CCNC: 33 U/L
ANION GAP SERPL CALC-SCNC: 11 MMOL/L
AST SERPL-CCNC: 16 U/L
BILIRUB SERPL-MCNC: 0.3 MG/DL
BUN SERPL-MCNC: 9 MG/DL
CALCIUM SERPL-MCNC: 9.5 MG/DL
CHLORIDE SERPL-SCNC: 106 MMOL/L
CHOLEST SERPL-MCNC: 136 MG/DL
CO2 SERPL-SCNC: 26 MMOL/L
CREAT SERPL-MCNC: 0.75 MG/DL
EGFR: 85 ML/MIN/1.73M2
FOLATE SERPL-MCNC: 14.3 NG/ML
GLUCOSE SERPL-MCNC: 146 MG/DL
HDLC SERPL-MCNC: 41 MG/DL
LDLC SERPL CALC-MCNC: 58 MG/DL
NONHDLC SERPL-MCNC: 95 MG/DL
POTASSIUM SERPL-SCNC: 4.7 MMOL/L
PROT SERPL-MCNC: 6.6 G/DL
SODIUM SERPL-SCNC: 143 MMOL/L
TRIGL SERPL-MCNC: 185 MG/DL
TSH SERPL-ACNC: 2.8 UIU/ML
VIT B12 SERPL-MCNC: 475 PG/ML

## 2023-06-20 ENCOUNTER — RESULT REVIEW (OUTPATIENT)
Age: 73
End: 2023-06-20

## 2023-06-20 ENCOUNTER — OUTPATIENT (OUTPATIENT)
Dept: OUTPATIENT SERVICES | Facility: HOSPITAL | Age: 73
LOS: 1 days | End: 2023-06-20
Payer: MEDICARE

## 2023-06-20 ENCOUNTER — APPOINTMENT (OUTPATIENT)
Dept: MAMMOGRAPHY | Facility: HOSPITAL | Age: 73
End: 2023-06-20
Payer: MEDICARE

## 2023-06-20 ENCOUNTER — APPOINTMENT (OUTPATIENT)
Dept: CARDIOLOGY | Facility: CLINIC | Age: 73
End: 2023-06-20
Payer: MEDICARE

## 2023-06-20 DIAGNOSIS — Z90.89 ACQUIRED ABSENCE OF OTHER ORGANS: Chronic | ICD-10-CM

## 2023-06-20 DIAGNOSIS — Z98.890 OTHER SPECIFIED POSTPROCEDURAL STATES: Chronic | ICD-10-CM

## 2023-06-20 DIAGNOSIS — Z12.39 ENCOUNTER FOR OTHER SCREENING FOR MALIGNANT NEOPLASM OF BREAST: ICD-10-CM

## 2023-06-20 DIAGNOSIS — Z96.649 PRESENCE OF UNSPECIFIED ARTIFICIAL HIP JOINT: Chronic | ICD-10-CM

## 2023-06-20 PROCEDURE — 77063 BREAST TOMOSYNTHESIS BI: CPT | Mod: 26

## 2023-06-20 PROCEDURE — 77067 SCR MAMMO BI INCL CAD: CPT | Mod: 26

## 2023-06-20 PROCEDURE — 77063 BREAST TOMOSYNTHESIS BI: CPT

## 2023-06-20 PROCEDURE — 93880 EXTRACRANIAL BILAT STUDY: CPT

## 2023-06-20 PROCEDURE — 77067 SCR MAMMO BI INCL CAD: CPT

## 2023-06-21 ENCOUNTER — NON-APPOINTMENT (OUTPATIENT)
Age: 73
End: 2023-06-21

## 2023-06-28 ENCOUNTER — NON-APPOINTMENT (OUTPATIENT)
Age: 73
End: 2023-06-28

## 2023-06-28 ENCOUNTER — APPOINTMENT (OUTPATIENT)
Dept: FAMILY MEDICINE | Facility: CLINIC | Age: 73
End: 2023-06-28
Payer: MEDICARE

## 2023-06-28 PROCEDURE — 96372 THER/PROPH/DIAG INJ SC/IM: CPT

## 2023-06-28 RX ORDER — CYANOCOBALAMIN 1000 UG/ML
1000 INJECTION INTRAMUSCULAR; SUBCUTANEOUS
Qty: 0 | Refills: 0 | Status: COMPLETED | OUTPATIENT
Start: 2023-06-28

## 2023-06-28 RX ADMIN — CYANOCOBALAMIN 0 MCG/ML: 1000 INJECTION INTRAMUSCULAR; SUBCUTANEOUS at 00:00

## 2023-06-28 NOTE — ASSESSMENT
[FreeTextEntry1] : RTO for 4 weeks weekly for B12 injection recommended\par methylcobalamin 1000 mcg daily sublingual after that

## 2023-06-29 ENCOUNTER — RX RENEWAL (OUTPATIENT)
Age: 73
End: 2023-06-29

## 2023-07-13 NOTE — PROGRESS NOTE ADULT - PROVIDER SPECIALTY LIST ADULT
Orthopedics Rhombic Flap Text: The defect edges were debeveled with a #15 scalpel blade.  Given the location of the defect and the proximity to free margins a rhombic flap was deemed most appropriate.  Using a sterile surgical marker, an appropriate rhombic flap was drawn incorporating the defect.    The area thus outlined was incised deep to adipose tissue with a #15 scalpel blade.  The skin margins were undermined to an appropriate distance in all directions utilizing iris scissors.

## 2023-07-22 ENCOUNTER — NON-APPOINTMENT (OUTPATIENT)
Age: 73
End: 2023-07-22

## 2023-07-22 VITALS — WEIGHT: 236 LBS | HEIGHT: 62 IN | BODY MASS INDEX: 43.43 KG/M2

## 2023-07-22 DIAGNOSIS — Z87.891 PERSONAL HISTORY OF NICOTINE DEPENDENCE: ICD-10-CM

## 2023-07-22 NOTE — HISTORY OF PRESENT ILLNESS
[Former] : Former [TextBox_13] : Patient is scheduled for a annual  LDCT for lung cancer screening. Referred by Dr. Costa. Chart review performed to confirm eligibility for LDCT. \par \par  No documented personal history of lung cancer. No documented s/s of lung cancer.\par Patient is a former smoker (quit 4 years ago) with a 47 pack year hx. [PacksperYear] : 47

## 2023-07-24 ENCOUNTER — OUTPATIENT (OUTPATIENT)
Dept: OUTPATIENT SERVICES | Facility: HOSPITAL | Age: 73
LOS: 1 days | End: 2023-07-24
Payer: MEDICARE

## 2023-07-24 ENCOUNTER — APPOINTMENT (OUTPATIENT)
Dept: CT IMAGING | Facility: HOSPITAL | Age: 73
End: 2023-07-24
Payer: MEDICARE

## 2023-07-24 DIAGNOSIS — Z90.89 ACQUIRED ABSENCE OF OTHER ORGANS: Chronic | ICD-10-CM

## 2023-07-24 DIAGNOSIS — Z12.2 ENCOUNTER FOR SCREENING FOR MALIGNANT NEOPLASM OF RESPIRATORY ORGANS: ICD-10-CM

## 2023-07-24 DIAGNOSIS — Z96.649 PRESENCE OF UNSPECIFIED ARTIFICIAL HIP JOINT: Chronic | ICD-10-CM

## 2023-07-24 DIAGNOSIS — Z98.890 OTHER SPECIFIED POSTPROCEDURAL STATES: Chronic | ICD-10-CM

## 2023-07-24 PROCEDURE — 71271 CT THORAX LUNG CANCER SCR C-: CPT

## 2023-07-24 PROCEDURE — 71271 CT THORAX LUNG CANCER SCR C-: CPT | Mod: 26

## 2023-07-26 ENCOUNTER — APPOINTMENT (OUTPATIENT)
Dept: FAMILY MEDICINE | Facility: CLINIC | Age: 73
End: 2023-07-26
Payer: MEDICARE

## 2023-07-26 VITALS
TEMPERATURE: 97.7 F | OXYGEN SATURATION: 94 % | SYSTOLIC BLOOD PRESSURE: 114 MMHG | DIASTOLIC BLOOD PRESSURE: 71 MMHG | HEART RATE: 90 BPM | RESPIRATION RATE: 16 BRPM

## 2023-07-26 DIAGNOSIS — R10.2 PELVIC AND PERINEAL PAIN: ICD-10-CM

## 2023-07-26 DIAGNOSIS — R10.31 RIGHT LOWER QUADRANT PAIN: ICD-10-CM

## 2023-07-26 PROCEDURE — 96372 THER/PROPH/DIAG INJ SC/IM: CPT

## 2023-07-26 PROCEDURE — 99214 OFFICE O/P EST MOD 30 MIN: CPT | Mod: 25

## 2023-07-26 RX ORDER — CYANOCOBALAMIN 1000 UG/ML
1000 INJECTION INTRAMUSCULAR; SUBCUTANEOUS
Qty: 0 | Refills: 0 | Status: COMPLETED | OUTPATIENT
Start: 2023-07-26

## 2023-07-26 RX ADMIN — CYANOCOBALAMIN 0 MCG/ML: 1000 INJECTION INTRAMUSCULAR; SUBCUTANEOUS at 00:00

## 2023-07-26 NOTE — HISTORY OF PRESENT ILLNESS
[FreeTextEntry1] : Batool presents for B12 injection, has not yet purchased b12 supplement. Due for another b12 injection next week. C/o intermittent right groin pain still. X-ray results reviewed, mild OA right hip. No fevers, no n/v/d, no problems urinating. \par \par ROS: negative except as noted above\par

## 2023-07-26 NOTE — PHYSICAL EXAM
[No Acute Distress] : no acute distress [Well Nourished] : well nourished [Well Developed] : well developed [Well-Appearing] : well-appearing [Normal Voice/Communication] : normal voice/communication [Normal Sclera/Conjunctiva] : normal sclera/conjunctiva [Normal Outer Ear/Nose] : the outer ears and nose were normal in appearance [Coordination Grossly Intact] : coordination grossly intact [No Focal Deficits] : no focal deficits [Normal Gait] : normal gait [Speech Grossly Normal] : speech grossly normal [Memory Grossly Normal] : memory grossly normal [Normal Affect] : the affect was normal [Alert and Oriented x3] : oriented to person, place, and time [Normal Mood] : the mood was normal [Normal Insight/Judgement] : insight and judgment were intact

## 2023-07-28 DIAGNOSIS — R91.8 OTHER NONSPECIFIC ABNORMAL FINDING OF LUNG FIELD: ICD-10-CM

## 2023-08-03 RX ORDER — CYANOCOBALAMIN 1000 UG/ML
1000 INJECTION INTRAMUSCULAR; SUBCUTANEOUS
Qty: 1 | Refills: 0 | Status: ACTIVE | COMMUNITY
Start: 2023-08-03 | End: 1900-01-01

## 2023-08-03 NOTE — HISTORY OF PRESENT ILLNESS
[FreeTextEntry1] : Ms. NATHANIEL BRAVO, 73 year old female, former smoker, w/ hx of BCC of skin, CAD, MI on Plavix, HLD, HTN, ....., who presented  CT chest on 07/24/2023: - Emphysema is present.  - 7 mm right upper lobe nodule with ill-defined borders is enlarged from the prior study.  - Unchanged bilateral lower lobe subpleural 4 mm nodules.  Patient is here today for CT surgery consultation, referred by Dr. Rickie Costa (PCP).  warm Home

## 2023-08-03 NOTE — CONSULT LETTER
[Dear  ___] : Dear  [unfilled], [Consult Letter:] : I had the pleasure of evaluating your patient, [unfilled]. [( Thank you for referring [unfilled] for consultation for _____ )] : Thank you for referring [unfilled] for consultation for [unfilled] [Please see my note below.] : Please see my note below. [Consult Closing:] : Thank you very much for allowing me to participate in the care of this patient.  If you have any questions, please do not hesitate to contact me. [Sincerely,] : Sincerely, [FreeTextEntry2] : Dr. Rickie Costa (PCP/Ref) [FreeTextEntry3] : Janes Hillman MD  Attending Surgeon  Division of Thoracic Surgery  , Cardiovascular and Thoracic Surgery  [facility]

## 2023-08-04 ENCOUNTER — APPOINTMENT (OUTPATIENT)
Dept: FAMILY MEDICINE | Facility: CLINIC | Age: 73
End: 2023-08-04
Payer: MEDICARE

## 2023-08-04 PROCEDURE — 96372 THER/PROPH/DIAG INJ SC/IM: CPT

## 2023-08-04 RX ORDER — CYANOCOBALAMIN 1000 UG/ML
1000 INJECTION INTRAMUSCULAR; SUBCUTANEOUS
Qty: 0 | Refills: 0 | Status: COMPLETED | OUTPATIENT
Start: 2023-08-04

## 2023-08-04 RX ADMIN — CYANOCOBALAMIN 0 MCG/ML: 1000 INJECTION INTRAMUSCULAR; SUBCUTANEOUS at 00:00

## 2023-08-08 ENCOUNTER — APPOINTMENT (OUTPATIENT)
Dept: FAMILY MEDICINE | Facility: CLINIC | Age: 73
End: 2023-08-08

## 2023-08-15 NOTE — ASU PATIENT PROFILE, ADULT - FALL HARM RISK TYPE OF ASSESSMENT
Admission Colchicine Counseling:  Patient counseled regarding adverse effects including but not limited to stomach upset (nausea, vomiting, stomach pain, or diarrhea).  Patient instructed to limit alcohol consumption while taking this medication.  Colchicine may reduce blood counts especially with prolonged use.  The patient understands that monitoring of kidney function and blood counts may be required, especially at baseline. The patient verbalized understanding of the proper use and possible adverse effects of colchicine.  All of the patient's questions and concerns were addressed.

## 2023-08-16 ENCOUNTER — APPOINTMENT (OUTPATIENT)
Dept: THORACIC SURGERY | Facility: CLINIC | Age: 73
End: 2023-08-16
Payer: MEDICARE

## 2023-08-16 ENCOUNTER — NON-APPOINTMENT (OUTPATIENT)
Age: 73
End: 2023-08-16

## 2023-08-16 VITALS
HEART RATE: 90 BPM | OXYGEN SATURATION: 93 % | SYSTOLIC BLOOD PRESSURE: 119 MMHG | DIASTOLIC BLOOD PRESSURE: 77 MMHG | HEIGHT: 62 IN

## 2023-08-16 PROCEDURE — 99214 OFFICE O/P EST MOD 30 MIN: CPT

## 2023-08-17 NOTE — ASSESSMENT
[FreeTextEntry1] : Ms. NATHANIEL BRAVO, 73 year old female, former smoker (1 ppd since age 21, quit 2019), current user of vaping products, hx of ETOH abuse, w/ hx of CAD s/p 2 stents (2019, then 08/2020 on Plavix), MI (08/2020), HLD, HTN, BCC of the forehead, Bipolar Disorder, who referred by Dr. Rickie Costa (PCP) for increasing size lung nodule.  I have reviewed the patient's medical records and diagnostic images at time of this office consultation and have made the following recommendation: 1. CT scan reviewed, increasing size RUL nodule is suspicious for an early stage lung cancer, I recommended a Rt VATS wedge rxn of the RUL nodule. Risks and benefits and alternatives explained to patient, all questions answered, patient agreed to proceed with surgery. 2. PFTs 3. Cardiac clearance, PST prior to surgery. 4. HOLD Plavix per cardiologist's recommendation.  I, Dr. Janes Hillman, personally performed the evaluation and management (E/M) services for this established patient who presents today with (a) new problem(s)/exacerbation of (an) existing condition(s). That E/M includes conducting the examination, assessing all new/exacerbated conditions, and establishing a new plan of care. Today, my ACP, Erin Maurer NP was here to observe my evaluation and management services for this new problem/exacerbated condition to be followed going forward.

## 2023-08-17 NOTE — HISTORY OF PRESENT ILLNESS
[FreeTextEntry1] : Ms. NATHANIEL BRAVO, 73 year old female, former smoker (1 ppd since age 21, quit 2019), current user of vaping products, hx of ETOH abuse, w/ hx of CAD s/p 2 stents (2019, then 08/2020 on Plavix), MI (08/2020), HLD, HTN, BCC of the forehead, Bipolar Disorder, who referred by Dr. Rickie Costa (PCP) for lung nodule. Initial lung CA screening CT was 03/2020, then Feb 2022, then 07/2023.  CT chest on 07/24/2023: - Emphysema is present.  - 7 mm right upper lobe nodule with ill-defined borders is enlarged from the prior study.  - Unchanged bilateral lower lobe subpleural 4 mm nodules.  Patient is here today for CT surgery consultation.  Denies SOB, CP, cough.

## 2023-08-17 NOTE — PHYSICAL EXAM
[Restricted in physically strenuous activity but ambulatory and able to carry out work of a light or sedentary nature] : Status 1- Restricted in physically strenuous activity but ambulatory and able to carry out work of a light or sedentary nature, e.g., light house work, office work [General Appearance - Alert] : alert [General Appearance - In No Acute Distress] : in no acute distress [Sclera] : the sclera and conjunctiva were normal [PERRL With Normal Accommodation] : pupils were equal in size, round, and reactive to light [Extraocular Movements] : extraocular movements were intact [Outer Ear] : the ears and nose were normal in appearance [Oropharynx] : the oropharynx was normal [Neck Appearance] : the appearance of the neck was normal [Neck Cervical Mass (___cm)] : no neck mass was observed [Jugular Venous Distention Increased] : there was no jugular-venous distention [Thyroid Diffuse Enlargement] : the thyroid was not enlarged [Thyroid Nodule] : there were no palpable thyroid nodules [Auscultation Breath Sounds / Voice Sounds] : lungs were clear to auscultation bilaterally [Heart Rate And Rhythm] : heart rate was normal and rhythm regular [Heart Sounds] : normal S1 and S2 [Heart Sounds Gallop] : no gallops [Murmurs] : no murmurs [Heart Sounds Pericardial Friction Rub] : no pericardial rub [Examination Of The Chest] : the chest was normal in appearance [Chest Visual Inspection Thoracic Asymmetry] : no chest asymmetry [Diminished Respiratory Excursion] : normal chest expansion [2+] : left 2+ [Bowel Sounds] : normal bowel sounds [Abdomen Soft] : soft [Abdomen Tenderness] : non-tender [Abdomen Mass (___ Cm)] : no abdominal mass palpated [Cervical Lymph Nodes Enlarged Posterior Bilaterally] : posterior cervical [Cervical Lymph Nodes Enlarged Anterior Bilaterally] : anterior cervical [Supraclavicular Lymph Nodes Enlarged Bilaterally] : supraclavicular [No CVA Tenderness] : no ~M costovertebral angle tenderness [No Spinal Tenderness] : no spinal tenderness [Abnormal Walk] : normal gait [Nail Clubbing] : no clubbing  or cyanosis of the fingernails [Musculoskeletal - Swelling] : no joint swelling seen [Motor Tone] : muscle strength and tone were normal [Skin Color & Pigmentation] : normal skin color and pigmentation [Skin Turgor] : normal skin turgor [] : no rash [Deep Tendon Reflexes (DTR)] : deep tendon reflexes were 2+ and symmetric [Sensation] : the sensory exam was normal to light touch and pinprick [No Focal Deficits] : no focal deficits [Oriented To Time, Place, And Person] : oriented to person, place, and time [Impaired Insight] : insight and judgment were intact [Affect] : the affect was normal [FreeTextEntry1] : obese

## 2023-08-17 NOTE — CONSULT LETTER
[Dear  ___] : Dear  [unfilled], [Consult Letter:] : I had the pleasure of evaluating your patient, [unfilled]. [( Thank you for referring [unfilled] for consultation for _____ )] : Thank you for referring [unfilled] for consultation for [unfilled] [Please see my note below.] : Please see my note below. [Consult Closing:] : Thank you very much for allowing me to participate in the care of this patient.  If you have any questions, please do not hesitate to contact me. [Sincerely,] : Sincerely, [FreeTextEntry2] : Dr. Rickie Costa (PCP/Ref) [FreeTextEntry3] : Janes Hillman MD  Attending Surgeon  Division of Thoracic Surgery  , Cardiovascular and Thoracic Surgery  [facility] at [facility]

## 2023-08-22 RX ADMIN — CYANOCOBALAMIN 1 MCG/ML: 1000 INJECTION INTRAMUSCULAR; SUBCUTANEOUS at 00:00

## 2023-08-23 ENCOUNTER — APPOINTMENT (OUTPATIENT)
Dept: FAMILY MEDICINE | Facility: CLINIC | Age: 73
End: 2023-08-23
Payer: MEDICARE

## 2023-08-23 DIAGNOSIS — E53.8 DEFICIENCY OF OTHER SPECIFIED B GROUP VITAMINS: ICD-10-CM

## 2023-08-23 PROCEDURE — 96372 THER/PROPH/DIAG INJ SC/IM: CPT

## 2023-08-23 RX ORDER — CYANOCOBALAMIN 1000 UG/ML
1000 INJECTION INTRAMUSCULAR; SUBCUTANEOUS
Qty: 0 | Refills: 0 | Status: COMPLETED | OUTPATIENT
Start: 2023-08-22

## 2023-08-24 ENCOUNTER — APPOINTMENT (OUTPATIENT)
Dept: PULMONOLOGY | Facility: CLINIC | Age: 73
End: 2023-08-24
Payer: MEDICARE

## 2023-08-24 ENCOUNTER — OUTPATIENT (OUTPATIENT)
Dept: OUTPATIENT SERVICES | Facility: HOSPITAL | Age: 73
LOS: 1 days | End: 2023-08-24

## 2023-08-24 VITALS
WEIGHT: 227.96 LBS | TEMPERATURE: 98 F | DIASTOLIC BLOOD PRESSURE: 68 MMHG | SYSTOLIC BLOOD PRESSURE: 100 MMHG | RESPIRATION RATE: 16 BRPM | HEIGHT: 62 IN | OXYGEN SATURATION: 98 % | HEART RATE: 77 BPM

## 2023-08-24 DIAGNOSIS — R91.1 SOLITARY PULMONARY NODULE: ICD-10-CM

## 2023-08-24 DIAGNOSIS — I10 ESSENTIAL (PRIMARY) HYPERTENSION: ICD-10-CM

## 2023-08-24 DIAGNOSIS — I25.10 ATHEROSCLEROTIC HEART DISEASE OF NATIVE CORONARY ARTERY WITHOUT ANGINA PECTORIS: ICD-10-CM

## 2023-08-24 DIAGNOSIS — Z90.89 ACQUIRED ABSENCE OF OTHER ORGANS: Chronic | ICD-10-CM

## 2023-08-24 DIAGNOSIS — Z98.890 OTHER SPECIFIED POSTPROCEDURAL STATES: Chronic | ICD-10-CM

## 2023-08-24 DIAGNOSIS — Z96.649 PRESENCE OF UNSPECIFIED ARTIFICIAL HIP JOINT: Chronic | ICD-10-CM

## 2023-08-24 LAB
ALBUMIN SERPL ELPH-MCNC: 4.4 G/DL — SIGNIFICANT CHANGE UP (ref 3.3–5)
ALP SERPL-CCNC: 106 U/L — SIGNIFICANT CHANGE UP (ref 40–120)
ALT FLD-CCNC: 18 U/L — SIGNIFICANT CHANGE UP (ref 4–33)
ANION GAP SERPL CALC-SCNC: 12 MMOL/L — SIGNIFICANT CHANGE UP (ref 7–14)
AST SERPL-CCNC: 21 U/L — SIGNIFICANT CHANGE UP (ref 4–32)
BILIRUB SERPL-MCNC: 0.4 MG/DL — SIGNIFICANT CHANGE UP (ref 0.2–1.2)
BLD GP AB SCN SERPL QL: NEGATIVE — SIGNIFICANT CHANGE UP
BUN SERPL-MCNC: 11 MG/DL — SIGNIFICANT CHANGE UP (ref 7–23)
CALCIUM SERPL-MCNC: 9.4 MG/DL — SIGNIFICANT CHANGE UP (ref 8.4–10.5)
CHLORIDE SERPL-SCNC: 102 MMOL/L — SIGNIFICANT CHANGE UP (ref 98–107)
CO2 SERPL-SCNC: 24 MMOL/L — SIGNIFICANT CHANGE UP (ref 22–31)
CREAT SERPL-MCNC: 0.77 MG/DL — SIGNIFICANT CHANGE UP (ref 0.5–1.3)
EGFR: 82 ML/MIN/1.73M2 — SIGNIFICANT CHANGE UP
GLUCOSE SERPL-MCNC: 89 MG/DL — SIGNIFICANT CHANGE UP (ref 70–99)
HCT VFR BLD CALC: 42.4 % — SIGNIFICANT CHANGE UP (ref 34.5–45)
HGB BLD-MCNC: 14.5 G/DL — SIGNIFICANT CHANGE UP (ref 11.5–15.5)
MCHC RBC-ENTMCNC: 29 PG — SIGNIFICANT CHANGE UP (ref 27–34)
MCHC RBC-ENTMCNC: 34.2 GM/DL — SIGNIFICANT CHANGE UP (ref 32–36)
MCV RBC AUTO: 84.8 FL — SIGNIFICANT CHANGE UP (ref 80–100)
NRBC # BLD: 0 /100 WBCS — SIGNIFICANT CHANGE UP (ref 0–0)
NRBC # FLD: 0 K/UL — SIGNIFICANT CHANGE UP (ref 0–0)
PLATELET # BLD AUTO: 224 K/UL — SIGNIFICANT CHANGE UP (ref 150–400)
POTASSIUM SERPL-MCNC: 4.5 MMOL/L — SIGNIFICANT CHANGE UP (ref 3.5–5.3)
POTASSIUM SERPL-SCNC: 4.5 MMOL/L — SIGNIFICANT CHANGE UP (ref 3.5–5.3)
PROT SERPL-MCNC: 7.3 G/DL — SIGNIFICANT CHANGE UP (ref 6–8.3)
RBC # BLD: 5 M/UL — SIGNIFICANT CHANGE UP (ref 3.8–5.2)
RBC # FLD: 12.1 % — SIGNIFICANT CHANGE UP (ref 10.3–14.5)
RH IG SCN BLD-IMP: POSITIVE — SIGNIFICANT CHANGE UP
SODIUM SERPL-SCNC: 138 MMOL/L — SIGNIFICANT CHANGE UP (ref 135–145)
WBC # BLD: 8.15 K/UL — SIGNIFICANT CHANGE UP (ref 3.8–10.5)
WBC # FLD AUTO: 8.15 K/UL — SIGNIFICANT CHANGE UP (ref 3.8–10.5)

## 2023-08-24 PROCEDURE — 94729 DIFFUSING CAPACITY: CPT

## 2023-08-24 PROCEDURE — 94010 BREATHING CAPACITY TEST: CPT

## 2023-08-24 PROCEDURE — 94726 PLETHYSMOGRAPHY LUNG VOLUMES: CPT

## 2023-08-24 RX ORDER — LAMOTRIGINE 25 MG/1
1 TABLET, ORALLY DISINTEGRATING ORAL
Qty: 0 | Refills: 0 | DISCHARGE

## 2023-08-24 RX ORDER — SODIUM CHLORIDE 9 MG/ML
1000 INJECTION, SOLUTION INTRAVENOUS
Refills: 0 | Status: DISCONTINUED | OUTPATIENT
Start: 2023-09-07 | End: 2023-09-08

## 2023-08-24 RX ORDER — CHOLECALCIFEROL (VITAMIN D3) 125 MCG
1 CAPSULE ORAL
Qty: 0 | Refills: 0 | DISCHARGE

## 2023-08-24 RX ORDER — SIMVASTATIN 20 MG/1
1 TABLET, FILM COATED ORAL
Qty: 0 | Refills: 0 | DISCHARGE

## 2023-08-24 RX ORDER — OLANZAPINE 15 MG/1
1 TABLET, FILM COATED ORAL
Qty: 0 | Refills: 0 | DISCHARGE

## 2023-08-24 RX ORDER — PANTOPRAZOLE SODIUM 20 MG/1
1 TABLET, DELAYED RELEASE ORAL
Qty: 0 | Refills: 0 | DISCHARGE

## 2023-08-24 RX ORDER — LOSARTAN POTASSIUM 100 MG/1
1 TABLET, FILM COATED ORAL
Qty: 0 | Refills: 0 | DISCHARGE

## 2023-08-24 NOTE — H&P PST ADULT - MS GEN HX ROS MEA POS PC
Patient transferred to [address] [place] [address] via bed. NC at 2LPM. Blood pressure 121/49, pulse (!) 57, temperature 97.3 °F (36.3 °C), resp. rate 16, height 5' 2\" (1.575 m), weight 80.5 kg (177 lb 9 oz), SpO2 97 %. Awais Manriquez RN at bedside. arthritis/joint pain/back pain

## 2023-08-24 NOTE — H&P PST ADULT - HISTORY OF PRESENT ILLNESS
73 year old female with hx of CAD x 2 stents, , Bipolar Disease, Osteoarthritis, Patient presents preop dx of pulmonary solitary nodule. Patient is scheduled for right video thoracic surgery , wedge resection of right upper lobe nodule

## 2023-08-24 NOTE — H&P PST ADULT - PROBLEM SELECTOR PLAN 2
requesting cardiac eval due hx of stents    instructed to obtain stop on Plavix from cardio    continue ASA    copy of EKG and cath report in the chart

## 2023-08-24 NOTE — H&P PST ADULT - NSICDXPASTMEDICALHX_GEN_ALL_CORE_FT
Spontaneous, unlabored and symmetrical PAST MEDICAL HISTORY:  Anxiousness concern re surgery    Arm fracture, right 2010  s/p fall    Benign hypertension     Bipolar disorder oral medication    CAD (coronary artery disease)     Cancer of skin multiple locations several have been removed and or laser    face (forehead), nose, arms    Cloudy urine     Conjunctivitis, bacterial     COPD (chronic obstructive pulmonary disease)     Gait disturbance     H/O gastroesophageal reflux (GERD)     Kidney lesion     Myocardial infarct, old 2016    Unilateral primary osteoarthritis, left hip     Weight loss attempting to loss weight states so far has lost about 10 pounds

## 2023-08-24 NOTE — H&P PST ADULT - NSICDXPASTSURGICALHX_GEN_ALL_CORE_FT
PAST SURGICAL HISTORY:  History of hip replacement May 2020    S/P cardiac catheterization x 4    S/P tonsillectomy childhood

## 2023-08-24 NOTE — H&P PST ADULT - PROBLEM SELECTOR PLAN 1
Patient tentatively scheduled for right video thoracic surgery , wedge resection of right upper lobe nodule       Pre-op instructions provided. Pt given verbal and written instructions with teach back on chlorhexidine shampoo. Pt verbalized understanding with return demonstration.    Pt will take own medication on the morning of procedure for GI prophylaxis.

## 2023-08-24 NOTE — H&P PST ADULT - ATTENDING COMMENTS
Patient seen and examined agree with above note as modified, where appropriate, by me. The risks, benefits and alternatives of the procedure were explained to the patient including but not limited to the risks of bleeding, infection, prolonged air leak, benign disease, shortness of breath, chronic pain, oxygen dependance, lymphatic leak and nerve injury. All of the patient's questions were answered, she demonstrated understanding and freely consented to the procedure.

## 2023-08-24 NOTE — H&P PST ADULT - NSICDXFAMILYHX_GEN_ALL_CORE_FT
FAMILY HISTORY:  Father  Still living? Unknown  Family history of brain cancer, Age at diagnosis: Age Unknown    Mother  Still living? Unknown  Family history of lung cancer, Age at diagnosis: Age Unknown    Sibling  Still living? Unknown  Family history of lung cancer, Age at diagnosis: Age Unknown

## 2023-08-29 ENCOUNTER — APPOINTMENT (OUTPATIENT)
Dept: ULTRASOUND IMAGING | Facility: HOSPITAL | Age: 73
End: 2023-08-29

## 2023-08-31 ENCOUNTER — APPOINTMENT (OUTPATIENT)
Dept: CARDIOLOGY | Facility: CLINIC | Age: 73
End: 2023-08-31
Payer: MEDICARE

## 2023-08-31 ENCOUNTER — NON-APPOINTMENT (OUTPATIENT)
Age: 73
End: 2023-08-31

## 2023-08-31 VITALS
BODY MASS INDEX: 43.06 KG/M2 | WEIGHT: 234 LBS | HEIGHT: 62 IN | RESPIRATION RATE: 17 BRPM | DIASTOLIC BLOOD PRESSURE: 73 MMHG | SYSTOLIC BLOOD PRESSURE: 115 MMHG | HEART RATE: 83 BPM | OXYGEN SATURATION: 94 %

## 2023-08-31 PROBLEM — N28.9 DISORDER OF KIDNEY AND URETER, UNSPECIFIED: Chronic | Status: ACTIVE | Noted: 2023-08-24

## 2023-08-31 PROCEDURE — 93000 ELECTROCARDIOGRAM COMPLETE: CPT

## 2023-08-31 PROCEDURE — 99214 OFFICE O/P EST MOD 30 MIN: CPT

## 2023-08-31 NOTE — PHYSICAL EXAM
[General Appearance - Well Developed] : well developed [Normal Appearance] : normal appearance [Well Groomed] : well groomed [General Appearance - Well Nourished] : well nourished [No Deformities] : no deformities [General Appearance - In No Acute Distress] : no acute distress [Normal Conjunctiva] : the conjunctiva exhibited no abnormalities [Eyelids - No Xanthelasma] : the eyelids demonstrated no xanthelasmas [Normal Oral Mucosa] : normal oral mucosa [No Oral Pallor] : no oral pallor [No Oral Cyanosis] : no oral cyanosis [Normal Jugular Venous A Waves Present] : normal jugular venous A waves present [Normal Jugular Venous V Waves Present] : normal jugular venous V waves present [No Jugular Venous Fierro A Waves] : no jugular venous feirro A waves [Respiration, Rhythm And Depth] : normal respiratory rhythm and effort [Exaggerated Use Of Accessory Muscles For Inspiration] : no accessory muscle use [Auscultation Breath Sounds / Voice Sounds] : lungs were clear to auscultation bilaterally [Heart Rate And Rhythm] : heart rate and rhythm were normal [Heart Sounds] : normal S1 and S2 [Murmurs] : no murmurs present [Abdomen Soft] : soft [Abdomen Tenderness] : non-tender [Abdomen Mass (___ Cm)] : no abdominal mass palpated [Abnormal Walk] : normal gait [Gait - Sufficient For Exercise Testing] : the gait was sufficient for exercise testing [Nail Clubbing] : no clubbing of the fingernails [Cyanosis, Localized] : no localized cyanosis [Petechial Hemorrhages (___cm)] : no petechial hemorrhages [Skin Color & Pigmentation] : normal skin color and pigmentation [] : no rash [No Venous Stasis] : no venous stasis [Skin Lesions] : no skin lesions [No Skin Ulcers] : no skin ulcer [No Xanthoma] : no  xanthoma was observed [Oriented To Time, Place, And Person] : oriented to person, place, and time [Affect] : the affect was normal [Mood] : the mood was normal [No Anxiety] : not feeling anxious

## 2023-09-01 ENCOUNTER — APPOINTMENT (OUTPATIENT)
Dept: FAMILY MEDICINE | Facility: CLINIC | Age: 73
End: 2023-09-01
Payer: MEDICARE

## 2023-09-01 VITALS
OXYGEN SATURATION: 95 % | HEIGHT: 62 IN | TEMPERATURE: 98.6 F | DIASTOLIC BLOOD PRESSURE: 70 MMHG | WEIGHT: 233 LBS | BODY MASS INDEX: 42.88 KG/M2 | HEART RATE: 67 BPM | SYSTOLIC BLOOD PRESSURE: 111 MMHG | RESPIRATION RATE: 17 BRPM

## 2023-09-01 DIAGNOSIS — R91.1 SOLITARY PULMONARY NODULE: ICD-10-CM

## 2023-09-01 PROCEDURE — 96372 THER/PROPH/DIAG INJ SC/IM: CPT

## 2023-09-01 PROCEDURE — 99214 OFFICE O/P EST MOD 30 MIN: CPT | Mod: 25

## 2023-09-01 RX ORDER — CYANOCOBALAMIN 1000 UG/ML
1000 INJECTION INTRAMUSCULAR; SUBCUTANEOUS
Qty: 0 | Refills: 0 | Status: COMPLETED | OUTPATIENT
Start: 2023-09-01

## 2023-09-01 RX ADMIN — CYANOCOBALAMIN 0 MCG/ML: 1000 INJECTION INTRAMUSCULAR; SUBCUTANEOUS at 00:00

## 2023-09-05 ENCOUNTER — APPOINTMENT (OUTPATIENT)
Dept: CARDIOLOGY | Facility: CLINIC | Age: 73
End: 2023-09-05
Payer: MEDICARE

## 2023-09-05 LAB
APTT BLD: 27.2 SEC
INR PPP: 0.95 RATIO
PT BLD: 10.7 SEC

## 2023-09-05 PROCEDURE — 93306 TTE W/DOPPLER COMPLETE: CPT | Mod: PD

## 2023-09-06 NOTE — HISTORY OF PRESENT ILLNESS
[Preoperative Visit] : for a medical evaluation prior to surgery [Scheduled Procedure ___] : a [unfilled] [Date of Surgery ___] : on [unfilled] [Surgeon Name ___] : surgeon: [unfilled] [FreeTextEntry1] :  NATHANIEL BRAVO comes today for evaluation prior to a planned thoracic lung VATS procedure. She was advised to undergo a complete cardiac evaluation. She denies chest pains shortness of breath or loss of consciousness.

## 2023-09-06 NOTE — DISCUSSION/SUMMARY
[EKG obtained to assist in diagnosis and management of assessed problem(s)] : EKG obtained to assist in diagnosis and management of assessed problem(s) [Procedure Intermediate Risk] : the procedure risk is intermediate [Patient Intermediate Risk] : the patient is an intermediate risk [Additional Diagnostics Recommended] : additional diagnostics recommended [FreeTextEntry1] : Echocardiograph

## 2023-09-06 NOTE — ASSESSMENT
[FreeTextEntry1] : Without evidence or recent infarction overt heart failure or unstable angina and based upon a satisfactory stress test and stable ekg may undergo planned VATS surgery which constitutes intermediate to high-risk surgery in a patient with intermediate cardiac risk at an ASA class II or III anesthesia risk.  Plavix may be held for 7 days prior to planned surgery however aspirin will be maintained throughout the perioperative period and even on the day of surgery given indwelling cardiac stents.  An echo is planned preoperatively for risk assessment. Batool is reluctant to undergo a nuclear stress test she is concerned about inherent risks of stress testing.  Medical necessity This is a high encounter based upon two or more chronic illnesses with mild exacerbation requiring further management and evaluation.   EKG is indicated for evaluation of preoperative cardiovascular risk. all questions were answered to Her satisfaction.  Addendum 9/6/2023 Echocardiograph is reviewed and demonstrates preserved left ventricular function I therefore Ms. Morse may undergo planned VATS procedure at an ASA class II or III anesthesia risk.

## 2023-09-06 NOTE — ASU PATIENT PROFILE, ADULT - VISION (WITH CORRECTIVE LENSES IF THE PATIENT USUALLY WEARS THEM):
Normal vision: sees adequately in most situations; can see medication labels, newsprint daughter has glasses/Normal vision: sees adequately in most situations; can see medication labels, newsprint

## 2023-09-06 NOTE — ASU PATIENT PROFILE, ADULT - FALL HARM RISK - UNIVERSAL INTERVENTIONS
Normal Bed in lowest position, wheels locked, appropriate side rails in place/Call bell, personal items and telephone in reach/Instruct patient to call for assistance before getting out of bed or chair/Non-slip footwear when patient is out of bed/[place] to call system/Physically safe environment - no spills, clutter or unnecessary equipment/Purposeful Proactive Rounding/Room/bathroom lighting operational, light cord in reach

## 2023-09-06 NOTE — REASON FOR VISIT
[CV Risk Factors and Non-Cardiac Disease] : CV risk factors and non-cardiac disease [Arrhythmia/ECG Abnorrmalities] : arrhythmia/ECG abnormalities [FreeTextEntry3] : Dr Julissa Staples [FreeTextEntry1] : Bat[place] was told by dr Staples fo follow up with cardiology I we have checked the QT interval today and it is satisfactory routine blood work is planned and she will update lipids at that time /407 down from 424 has some typical dyspnea on exertion no worse than previously\par  \par  5/16/23\par  symptoms unchanged relatively asymptomatic. LDL 58 . qtc 401 [place] risk low . \par

## 2023-09-07 ENCOUNTER — APPOINTMENT (OUTPATIENT)
Dept: THORACIC SURGERY | Facility: HOSPITAL | Age: 73
End: 2023-09-07

## 2023-09-07 ENCOUNTER — RESULT REVIEW (OUTPATIENT)
Age: 73
End: 2023-09-07

## 2023-09-07 ENCOUNTER — INPATIENT (INPATIENT)
Facility: HOSPITAL | Age: 73
LOS: 4 days | Discharge: HOME HEALTH | End: 2023-09-12
Attending: THORACIC SURGERY (CARDIOTHORACIC VASCULAR SURGERY) | Admitting: THORACIC SURGERY (CARDIOTHORACIC VASCULAR SURGERY)
Payer: MEDICARE

## 2023-09-07 VITALS
HEIGHT: 62 IN | OXYGEN SATURATION: 94 % | RESPIRATION RATE: 18 BRPM | HEART RATE: 85 BPM | DIASTOLIC BLOOD PRESSURE: 68 MMHG | TEMPERATURE: 98 F | WEIGHT: 227.96 LBS | SYSTOLIC BLOOD PRESSURE: 116 MMHG

## 2023-09-07 DIAGNOSIS — Z98.890 OTHER SPECIFIED POSTPROCEDURAL STATES: Chronic | ICD-10-CM

## 2023-09-07 DIAGNOSIS — R91.1 SOLITARY PULMONARY NODULE: ICD-10-CM

## 2023-09-07 DIAGNOSIS — Z96.649 PRESENCE OF UNSPECIFIED ARTIFICIAL HIP JOINT: Chronic | ICD-10-CM

## 2023-09-07 DIAGNOSIS — Z90.89 ACQUIRED ABSENCE OF OTHER ORGANS: Chronic | ICD-10-CM

## 2023-09-07 PROCEDURE — 71045 X-RAY EXAM CHEST 1 VIEW: CPT | Mod: 26

## 2023-09-07 PROCEDURE — 88331 PATH CONSLTJ SURG 1 BLK 1SPC: CPT | Mod: 26

## 2023-09-07 PROCEDURE — 88305 TISSUE EXAM BY PATHOLOGIST: CPT | Mod: 26

## 2023-09-07 PROCEDURE — 88313 SPECIAL STAINS GROUP 2: CPT | Mod: 26

## 2023-09-07 PROCEDURE — 32674 THORACOSCOPY LYMPH NODE EXC: CPT

## 2023-09-07 PROCEDURE — 99233 SBSQ HOSP IP/OBS HIGH 50: CPT

## 2023-09-07 PROCEDURE — 31622 DX BRONCHOSCOPE/WASH: CPT

## 2023-09-07 PROCEDURE — 88307 TISSUE EXAM BY PATHOLOGIST: CPT | Mod: 26

## 2023-09-07 PROCEDURE — 32666 THORACOSCOPY W/WEDGE RESECT: CPT

## 2023-09-07 DEVICE — STAPLER COVIDIEN TRI-STAPLE 60MM PURPLE RELOAD: Type: IMPLANTABLE DEVICE | Site: RIGHT | Status: FUNCTIONAL

## 2023-09-07 DEVICE — LIGATING CLIPS WECK HORIZON LARGE (ORANGE) 24: Type: IMPLANTABLE DEVICE | Site: RIGHT | Status: FUNCTIONAL

## 2023-09-07 DEVICE — STAPLER COVIDIEN TRI-STAPLE 60MM BLACK RELOAD: Type: IMPLANTABLE DEVICE | Site: RIGHT | Status: FUNCTIONAL

## 2023-09-07 DEVICE — SURGICEL FIBRILLAR 2 X 4": Type: IMPLANTABLE DEVICE | Site: RIGHT | Status: FUNCTIONAL

## 2023-09-07 DEVICE — CHEST DRAIN THORACIC ARGYLE PVC 24FR STRAIGHT: Type: IMPLANTABLE DEVICE | Site: RIGHT | Status: FUNCTIONAL

## 2023-09-07 DEVICE — LIGATING CLIPS WECK HORIZON MEDIUM (BLUE) 24: Type: IMPLANTABLE DEVICE | Site: RIGHT | Status: FUNCTIONAL

## 2023-09-07 RX ORDER — TRAZODONE HCL 50 MG
100 TABLET ORAL DAILY
Refills: 0 | Status: DISCONTINUED | OUTPATIENT
Start: 2023-09-07 | End: 2023-09-12

## 2023-09-07 RX ORDER — ALBUTEROL 90 UG/1
2.5 AEROSOL, METERED ORAL EVERY 6 HOURS
Refills: 0 | Status: DISCONTINUED | OUTPATIENT
Start: 2023-09-07 | End: 2023-09-12

## 2023-09-07 RX ORDER — LAMOTRIGINE 25 MG/1
25 TABLET, ORALLY DISINTEGRATING ORAL DAILY
Refills: 0 | Status: DISCONTINUED | OUTPATIENT
Start: 2023-09-07 | End: 2023-09-12

## 2023-09-07 RX ORDER — BUPROPION HYDROCHLORIDE 150 MG/1
150 TABLET, EXTENDED RELEASE ORAL DAILY
Refills: 0 | Status: DISCONTINUED | OUTPATIENT
Start: 2023-09-07 | End: 2023-09-12

## 2023-09-07 RX ORDER — NICOTINE POLACRILEX 2 MG
1 GUM BUCCAL DAILY
Refills: 0 | Status: DISCONTINUED | OUTPATIENT
Start: 2023-09-07 | End: 2023-09-12

## 2023-09-07 RX ORDER — SENNA PLUS 8.6 MG/1
2 TABLET ORAL AT BEDTIME
Refills: 0 | Status: DISCONTINUED | OUTPATIENT
Start: 2023-09-07 | End: 2023-09-12

## 2023-09-07 RX ORDER — LAMOTRIGINE 25 MG/1
125 TABLET, ORALLY DISINTEGRATING ORAL DAILY
Refills: 0 | Status: DISCONTINUED | OUTPATIENT
Start: 2023-09-07 | End: 2023-09-12

## 2023-09-07 RX ORDER — LAMOTRIGINE 25 MG/1
250 TABLET, ORALLY DISINTEGRATING ORAL DAILY
Refills: 0 | Status: DISCONTINUED | OUTPATIENT
Start: 2023-09-07 | End: 2023-09-07

## 2023-09-07 RX ORDER — HYDROMORPHONE HYDROCHLORIDE 2 MG/ML
0.5 INJECTION INTRAMUSCULAR; INTRAVENOUS; SUBCUTANEOUS
Refills: 0 | Status: DISCONTINUED | OUTPATIENT
Start: 2023-09-07 | End: 2023-09-08

## 2023-09-07 RX ORDER — ATORVASTATIN CALCIUM 80 MG/1
80 TABLET, FILM COATED ORAL AT BEDTIME
Refills: 0 | Status: DISCONTINUED | OUTPATIENT
Start: 2023-09-07 | End: 2023-09-12

## 2023-09-07 RX ORDER — SODIUM CHLORIDE 9 MG/ML
4 INJECTION INTRAMUSCULAR; INTRAVENOUS; SUBCUTANEOUS EVERY 12 HOURS
Refills: 0 | Status: DISCONTINUED | OUTPATIENT
Start: 2023-09-07 | End: 2023-09-12

## 2023-09-07 RX ORDER — HYDROMORPHONE HYDROCHLORIDE 2 MG/ML
0.5 INJECTION INTRAMUSCULAR; INTRAVENOUS; SUBCUTANEOUS
Refills: 0 | Status: DISCONTINUED | OUTPATIENT
Start: 2023-09-07 | End: 2023-09-07

## 2023-09-07 RX ORDER — ONDANSETRON 8 MG/1
4 TABLET, FILM COATED ORAL EVERY 6 HOURS
Refills: 0 | Status: DISCONTINUED | OUTPATIENT
Start: 2023-09-07 | End: 2023-09-08

## 2023-09-07 RX ORDER — ACETAMINOPHEN 500 MG
1000 TABLET ORAL EVERY 6 HOURS
Refills: 0 | Status: DISCONTINUED | OUTPATIENT
Start: 2023-09-07 | End: 2023-09-08

## 2023-09-07 RX ORDER — LIDOCAINE 4 G/100G
1 CREAM TOPICAL EVERY 24 HOURS
Refills: 0 | Status: DISCONTINUED | OUTPATIENT
Start: 2023-09-07 | End: 2023-09-12

## 2023-09-07 RX ORDER — HYDROMORPHONE HYDROCHLORIDE 2 MG/ML
30 INJECTION INTRAMUSCULAR; INTRAVENOUS; SUBCUTANEOUS
Refills: 0 | Status: DISCONTINUED | OUTPATIENT
Start: 2023-09-07 | End: 2023-09-08

## 2023-09-07 RX ORDER — HEPARIN SODIUM 5000 [USP'U]/ML
7500 INJECTION INTRAVENOUS; SUBCUTANEOUS ONCE
Refills: 0 | Status: COMPLETED | OUTPATIENT
Start: 2023-09-07 | End: 2023-09-07

## 2023-09-07 RX ORDER — DORNASE ALFA 1 MG/ML
2.5 SOLUTION RESPIRATORY (INHALATION) EVERY 24 HOURS
Refills: 0 | Status: DISCONTINUED | OUTPATIENT
Start: 2023-09-07 | End: 2023-09-07

## 2023-09-07 RX ORDER — ACETAMINOPHEN 500 MG
975 TABLET ORAL ONCE
Refills: 0 | Status: COMPLETED | OUTPATIENT
Start: 2023-09-07 | End: 2023-09-07

## 2023-09-07 RX ORDER — HEPARIN SODIUM 5000 [USP'U]/ML
7500 INJECTION INTRAVENOUS; SUBCUTANEOUS EVERY 8 HOURS
Refills: 0 | Status: DISCONTINUED | OUTPATIENT
Start: 2023-09-07 | End: 2023-09-07

## 2023-09-07 RX ORDER — NALOXONE HYDROCHLORIDE 4 MG/.1ML
0.1 SPRAY NASAL
Refills: 0 | Status: DISCONTINUED | OUTPATIENT
Start: 2023-09-07 | End: 2023-09-08

## 2023-09-07 RX ORDER — OLANZAPINE 15 MG/1
10 TABLET, FILM COATED ORAL DAILY
Refills: 0 | Status: DISCONTINUED | OUTPATIENT
Start: 2023-09-07 | End: 2023-09-12

## 2023-09-07 RX ORDER — POLYETHYLENE GLYCOL 3350 17 G/17G
17 POWDER, FOR SOLUTION ORAL DAILY
Refills: 0 | Status: DISCONTINUED | OUTPATIENT
Start: 2023-09-07 | End: 2023-09-11

## 2023-09-07 RX ORDER — HEPARIN SODIUM 5000 [USP'U]/ML
5000 INJECTION INTRAVENOUS; SUBCUTANEOUS EVERY 8 HOURS
Refills: 0 | Status: DISCONTINUED | OUTPATIENT
Start: 2023-09-07 | End: 2023-09-12

## 2023-09-07 RX ADMIN — HYDROMORPHONE HYDROCHLORIDE 0.5 MILLIGRAM(S): 2 INJECTION INTRAMUSCULAR; INTRAVENOUS; SUBCUTANEOUS at 11:55

## 2023-09-07 RX ADMIN — HYDROMORPHONE HYDROCHLORIDE 0.5 MILLIGRAM(S): 2 INJECTION INTRAMUSCULAR; INTRAVENOUS; SUBCUTANEOUS at 11:45

## 2023-09-07 RX ADMIN — HEPARIN SODIUM 7500 UNIT(S): 5000 INJECTION INTRAVENOUS; SUBCUTANEOUS at 07:03

## 2023-09-07 RX ADMIN — BUPROPION HYDROCHLORIDE 150 MILLIGRAM(S): 150 TABLET, EXTENDED RELEASE ORAL at 13:49

## 2023-09-07 RX ADMIN — LIDOCAINE 1 PATCH: 4 CREAM TOPICAL at 13:00

## 2023-09-07 RX ADMIN — HYDROMORPHONE HYDROCHLORIDE 0.5 MILLIGRAM(S): 2 INJECTION INTRAMUSCULAR; INTRAVENOUS; SUBCUTANEOUS at 11:20

## 2023-09-07 RX ADMIN — HYDROMORPHONE HYDROCHLORIDE 0.5 MILLIGRAM(S): 2 INJECTION INTRAMUSCULAR; INTRAVENOUS; SUBCUTANEOUS at 11:10

## 2023-09-07 RX ADMIN — Medication 975 MILLIGRAM(S): at 07:04

## 2023-09-07 RX ADMIN — HYDROMORPHONE HYDROCHLORIDE 30 MILLILITER(S): 2 INJECTION INTRAMUSCULAR; INTRAVENOUS; SUBCUTANEOUS at 16:00

## 2023-09-07 RX ADMIN — ATORVASTATIN CALCIUM 80 MILLIGRAM(S): 80 TABLET, FILM COATED ORAL at 21:04

## 2023-09-07 RX ADMIN — SODIUM CHLORIDE 30 MILLILITER(S): 9 INJECTION, SOLUTION INTRAVENOUS at 19:44

## 2023-09-07 RX ADMIN — Medication 1000 MILLIGRAM(S): at 14:12

## 2023-09-07 RX ADMIN — ALBUTEROL 2.5 MILLIGRAM(S): 90 AEROSOL, METERED ORAL at 22:58

## 2023-09-07 RX ADMIN — Medication 400 MILLIGRAM(S): at 13:49

## 2023-09-07 RX ADMIN — HYDROMORPHONE HYDROCHLORIDE 0.5 MILLIGRAM(S): 2 INJECTION INTRAMUSCULAR; INTRAVENOUS; SUBCUTANEOUS at 17:27

## 2023-09-07 RX ADMIN — HYDROMORPHONE HYDROCHLORIDE 30 MILLILITER(S): 2 INJECTION INTRAMUSCULAR; INTRAVENOUS; SUBCUTANEOUS at 19:44

## 2023-09-07 RX ADMIN — Medication 100 MILLIGRAM(S): at 17:25

## 2023-09-07 RX ADMIN — SODIUM CHLORIDE 4 MILLILITER(S): 9 INJECTION INTRAMUSCULAR; INTRAVENOUS; SUBCUTANEOUS at 22:58

## 2023-09-07 RX ADMIN — OLANZAPINE 10 MILLIGRAM(S): 15 TABLET, FILM COATED ORAL at 17:25

## 2023-09-07 RX ADMIN — SENNA PLUS 2 TABLET(S): 8.6 TABLET ORAL at 21:04

## 2023-09-07 RX ADMIN — HYDROMORPHONE HYDROCHLORIDE 0.5 MILLIGRAM(S): 2 INJECTION INTRAMUSCULAR; INTRAVENOUS; SUBCUTANEOUS at 11:30

## 2023-09-07 RX ADMIN — LIDOCAINE 1 PATCH: 4 CREAM TOPICAL at 16:50

## 2023-09-07 RX ADMIN — Medication 1 PATCH: at 12:47

## 2023-09-07 RX ADMIN — HEPARIN SODIUM 5000 UNIT(S): 5000 INJECTION INTRAVENOUS; SUBCUTANEOUS at 21:04

## 2023-09-07 RX ADMIN — HYDROMORPHONE HYDROCHLORIDE 30 MILLILITER(S): 2 INJECTION INTRAMUSCULAR; INTRAVENOUS; SUBCUTANEOUS at 11:56

## 2023-09-07 RX ADMIN — LAMOTRIGINE 125 MILLIGRAM(S): 25 TABLET, ORALLY DISINTEGRATING ORAL at 15:39

## 2023-09-07 RX ADMIN — Medication 1 PATCH: at 16:51

## 2023-09-07 RX ADMIN — HYDROMORPHONE HYDROCHLORIDE 0.5 MILLIGRAM(S): 2 INJECTION INTRAMUSCULAR; INTRAVENOUS; SUBCUTANEOUS at 11:33

## 2023-09-07 NOTE — BRIEF OPERATIVE NOTE - OPERATION/FINDINGS
Flexible Bronchoscopy, Right VATS, Right Upper Lobe Wedge Resection, Mediastinal Lymph Node Dissection.    Left A-line placement.

## 2023-09-07 NOTE — BRIEF OPERATIVE NOTE - COMMENTS
INDIA Weber, provided direct first assist support to the surgeon during this surgical procedure. My involvement included positioning, prepping and draping the patient prior to surgery, ensuring clear visibility and exposure for the surgeon by using instruments such as retractors and suction, closing surgical incisions and dressing wounds. As well as other tasks as directed by the surgeon.

## 2023-09-07 NOTE — HISTORY OF PRESENT ILLNESS
[Coronary Artery Disease] : coronary artery disease [Chronic Anticoagulation] : chronic anticoagulation [(Patient denies any chest pain, claudication, dyspnea on exertion, orthopnea, palpitations or syncope)] : Patient denies any chest pain, claudication, dyspnea on exertion, orthopnea, palpitations or syncope [Moderate (4-6 METs)] : Moderate (4-6 METs) [Aortic Stenosis] : no aortic stenosis [Atrial Fibrillation] : no atrial fibrillation [Recent Myocardial Infarction] : no recent myocardial infarction [Implantable Device/Pacemaker] : no implantable device/pacemaker [Asthma] : no asthma [COPD] : no COPD [Sleep Apnea] : no sleep apnea [Smoker] : not a smoker [Family Member] : no family member with adverse anesthesia reaction/sudden death [Self] : no previous adverse anesthesia reaction [Chronic Kidney Disease] : no chronic kidney disease [Diabetes] : no diabetes [FreeTextEntry1] : lung biopsy [FreeTextEntry2] : 9/7/2023 [FreeTextEntry3] : Dr. Janes Hillman

## 2023-09-07 NOTE — BRIEF OPERATIVE NOTE - NSICDXBRIEFPROCEDURE_GEN_ALL_CORE_FT
PROCEDURES:  Bronchoscopy, flexible, with VATS and lung biopsy 07-Sep-2023 10:58:09  Michael Woodard  VATS wedge resection of right upper lobe of lung 07-Sep-2023 10:58:21  Michael Woodard

## 2023-09-07 NOTE — REVIEW OF SYSTEMS
[Nasal Discharge] : nasal discharge [Nocturia] : nocturia [Joint Pain] : joint pain [Insomnia] : insomnia [Anxiety] : anxiety [Fever] : no fever [Chills] : no chills [Hot Flashes] : no hot flashes [Night Sweats] : no night sweats [Recent Change In Weight] : ~T no recent weight change [Discharge] : no discharge [Pain] : no pain [Redness] : no redness [Dryness] : no dryness  [Vision Problems] : no vision problems [Itching] : no itching [Earache] : no earache [Hearing Loss] : no hearing loss [Nosebleed] : no nosebleeds [Hoarseness] : no hoarseness [Sore Throat] : no sore throat [Postnasal Drip] : no postnasal drip [Chest Pain] : no chest pain [Palpitations] : no palpitations [Leg Claudication] : no leg claudication [Lower Ext Edema] : no lower extremity edema [Shortness Of Breath] : no shortness of breath [Wheezing] : no wheezing [Cough] : no cough [Dyspnea on Exertion] : no dyspnea on exertion [Abdominal Pain] : no abdominal pain [Nausea] : no nausea [Constipation] : no constipation [Diarrhea] : diarrhea [Vomiting] : no vomiting [Heartburn] : no heartburn [Melena] : no melena [Dysuria] : no dysuria [Incontinence] : no incontinence [Hematuria] : no hematuria [Frequency] : no frequency [Vaginal Discharge] : no vaginal discharge [Joint Stiffness] : no joint stiffness [Joint Swelling] : no joint swelling [Muscle Weakness] : no muscle weakness [Muscle Pain] : no muscle pain [Itching] : no itching [Mole Changes] : no mole changes [Hair Changes] : no hair changes [Skin Rash] : no skin rash [Headache] : no headache [Dizziness] : no dizziness [Fainting] : no fainting [Confusion] : no confusion [Memory Loss] : no memory loss [Suicidal] : not suicidal [Depression] : no depression [Easy Bleeding] : no easy bleeding [Easy Bruising] : no easy bruising [Swollen Glands] : no swollen glands [FreeTextEntry4] : stuffy nose in mornings, then clears, chronic [FreeTextEntry8] : no pmb [FreeTextEntry9] : right hip pain [de-identified] : dry skin, actinics

## 2023-09-07 NOTE — PATIENT PROFILE ADULT - FALL HARM RISK - HARM RISK INTERVENTIONS
Assistance with ambulation/Assistance OOB with selected safe patient handling equipment/Communicate Risk of Fall with Harm to all staff/Discuss with provider need for PT consult/Monitor gait and stability/Provide patient with walking aids - walker, cane, crutches/Reinforce activity limits and safety measures with patient and family/Sit up slowly, dangle for a short time, stand at bedside before walking/Tailored Fall Risk Interventions/Use of alarms - bed, chair and/or voice tab/Visual Cue: Yellow wristband and red socks/Bed in lowest position, wheels locked, appropriate side rails in place/Call bell, personal items and telephone in reach/Instruct patient to call for assistance before getting out of bed or chair/Non-slip footwear when patient is out of bed/[place] to call system/Physically safe environment - no spills, clutter or unnecessary equipment/Purposeful Proactive Rounding/Room/bathroom lighting operational, light cord in reach

## 2023-09-07 NOTE — PATIENT PROFILE ADULT - FUNCTIONAL ASSESSMENT - BASIC MOBILITY 6.
3-calculated by average/Not able to assess (calculate score using [facility] averaging method)

## 2023-09-07 NOTE — PATIENT PROFILE ADULT - MST SCORE
Current Outpatient Prescriptions:  hydrochlorothiazide 25 MG Oral Tab Take 1 tablet (25 mg total) by mouth daily.  Disp: 90 tablet Rfl: 2 0

## 2023-09-07 NOTE — ASU PREOP CHECKLIST - INTERNAL PROSTHESES
Right hip surgery/ Stents cardiac/yes(specify) Right hip surgery/ Stents cardiac/ Right errting cant remove/yes(specify) Right hip surgery/ Stents cardiac/ Right earring cant remove/yes(specify)

## 2023-09-07 NOTE — ASSESSMENT
[High Risk Surgery - Intraperitoneal, Intrathoracic or Supringuinal Vascular Procedures] : High Risk Surgery - Intraperitoneal, Intrathoracic or Supringuinal Vascular Procedures - No (0) [Ischemic Heart Disease] : Ischemic Heart Disease  - Yes (1) [Prior Cerebrovascular Accident or TIA] : Prior Cerebrovascular Accident or TIA - No (0) [Congestive Heart Failure] : Congestive Heart Failure - No (0) [Creatinine >= 2mg/dL (1 Point)] : Creatinine >= 2mg/dL - No (0) [Insulin-dependent Diabetic (1 Point)] : Insulin-dependent Diabetic - No (0) [1] : 1 , RCRI Class: II, Risk of Post-Op Cardiac Complications: 6.0%, 95% CI for Risk Estimate: 4.9% - 7.4% [As per surgery] : as per surgery [Patient Optimized for Surgery] : Patient optimized for surgery [FreeTextEntry2] : last dose of plavix is today prior to procedure. Losartan and carvedilol with sips of water morning of procedure, no other meds. Stop all vitamins today.

## 2023-09-08 LAB
ANION GAP SERPL CALC-SCNC: 12 MMOL/L — SIGNIFICANT CHANGE UP (ref 7–14)
BASOPHILS # BLD AUTO: 0.03 K/UL — SIGNIFICANT CHANGE UP (ref 0–0.2)
BASOPHILS NFR BLD AUTO: 0.2 % — SIGNIFICANT CHANGE UP (ref 0–2)
BUN SERPL-MCNC: 9 MG/DL — SIGNIFICANT CHANGE UP (ref 7–23)
CALCIUM SERPL-MCNC: 9.1 MG/DL — SIGNIFICANT CHANGE UP (ref 8.4–10.5)
CHLORIDE SERPL-SCNC: 105 MMOL/L — SIGNIFICANT CHANGE UP (ref 98–107)
CO2 SERPL-SCNC: 23 MMOL/L — SIGNIFICANT CHANGE UP (ref 22–31)
CREAT SERPL-MCNC: 0.62 MG/DL — SIGNIFICANT CHANGE UP (ref 0.5–1.3)
EGFR: 95 ML/MIN/1.73M2 — SIGNIFICANT CHANGE UP
EOSINOPHIL # BLD AUTO: 0.01 K/UL — SIGNIFICANT CHANGE UP (ref 0–0.5)
EOSINOPHIL NFR BLD AUTO: 0.1 % — SIGNIFICANT CHANGE UP (ref 0–6)
GLUCOSE SERPL-MCNC: 136 MG/DL — HIGH (ref 70–99)
HCT VFR BLD CALC: 40.3 % — SIGNIFICANT CHANGE UP (ref 34.5–45)
HGB BLD-MCNC: 13.6 G/DL — SIGNIFICANT CHANGE UP (ref 11.5–15.5)
IANC: 12.21 K/UL — HIGH (ref 1.8–7.4)
IMM GRANULOCYTES NFR BLD AUTO: 0.4 % — SIGNIFICANT CHANGE UP (ref 0–0.9)
LYMPHOCYTES # BLD AUTO: 13.3 % — SIGNIFICANT CHANGE UP (ref 13–44)
LYMPHOCYTES # BLD AUTO: 2.06 K/UL — SIGNIFICANT CHANGE UP (ref 1–3.3)
MAGNESIUM SERPL-MCNC: 2.2 MG/DL — SIGNIFICANT CHANGE UP (ref 1.6–2.6)
MCHC RBC-ENTMCNC: 29 PG — SIGNIFICANT CHANGE UP (ref 27–34)
MCHC RBC-ENTMCNC: 33.7 GM/DL — SIGNIFICANT CHANGE UP (ref 32–36)
MCV RBC AUTO: 85.9 FL — SIGNIFICANT CHANGE UP (ref 80–100)
MONOCYTES # BLD AUTO: 1.07 K/UL — HIGH (ref 0–0.9)
MONOCYTES NFR BLD AUTO: 6.9 % — SIGNIFICANT CHANGE UP (ref 2–14)
NEUTROPHILS # BLD AUTO: 12.21 K/UL — HIGH (ref 1.8–7.4)
NEUTROPHILS NFR BLD AUTO: 79.1 % — HIGH (ref 43–77)
NRBC # BLD: 0 /100 WBCS — SIGNIFICANT CHANGE UP (ref 0–0)
NRBC # FLD: 0 K/UL — SIGNIFICANT CHANGE UP (ref 0–0)
PHOSPHATE SERPL-MCNC: 2.9 MG/DL — SIGNIFICANT CHANGE UP (ref 2.5–4.5)
PLATELET # BLD AUTO: 219 K/UL — SIGNIFICANT CHANGE UP (ref 150–400)
POTASSIUM SERPL-MCNC: 4.1 MMOL/L — SIGNIFICANT CHANGE UP (ref 3.5–5.3)
POTASSIUM SERPL-SCNC: 4.1 MMOL/L — SIGNIFICANT CHANGE UP (ref 3.5–5.3)
RBC # BLD: 4.69 M/UL — SIGNIFICANT CHANGE UP (ref 3.8–5.2)
RBC # FLD: 12.4 % — SIGNIFICANT CHANGE UP (ref 10.3–14.5)
SODIUM SERPL-SCNC: 140 MMOL/L — SIGNIFICANT CHANGE UP (ref 135–145)
WBC # BLD: 15.44 K/UL — HIGH (ref 3.8–10.5)
WBC # FLD AUTO: 15.44 K/UL — HIGH (ref 3.8–10.5)

## 2023-09-08 PROCEDURE — 71045 X-RAY EXAM CHEST 1 VIEW: CPT | Mod: 26

## 2023-09-08 PROCEDURE — 99233 SBSQ HOSP IP/OBS HIGH 50: CPT

## 2023-09-08 RX ORDER — HYDROMORPHONE HYDROCHLORIDE 2 MG/ML
0.5 INJECTION INTRAMUSCULAR; INTRAVENOUS; SUBCUTANEOUS
Refills: 0 | Status: DISCONTINUED | OUTPATIENT
Start: 2023-09-08 | End: 2023-09-12

## 2023-09-08 RX ORDER — HYDROMORPHONE HYDROCHLORIDE 2 MG/ML
4 INJECTION INTRAMUSCULAR; INTRAVENOUS; SUBCUTANEOUS
Refills: 0 | Status: DISCONTINUED | OUTPATIENT
Start: 2023-09-08 | End: 2023-09-12

## 2023-09-08 RX ORDER — ASPIRIN/CALCIUM CARB/MAGNESIUM 324 MG
81 TABLET ORAL DAILY
Refills: 0 | Status: DISCONTINUED | OUTPATIENT
Start: 2023-09-08 | End: 2023-09-12

## 2023-09-08 RX ORDER — HYDROMORPHONE HYDROCHLORIDE 2 MG/ML
2 INJECTION INTRAMUSCULAR; INTRAVENOUS; SUBCUTANEOUS
Refills: 0 | Status: DISCONTINUED | OUTPATIENT
Start: 2023-09-08 | End: 2023-09-12

## 2023-09-08 RX ORDER — CARVEDILOL PHOSPHATE 80 MG/1
3.12 CAPSULE, EXTENDED RELEASE ORAL EVERY 12 HOURS
Refills: 0 | Status: DISCONTINUED | OUTPATIENT
Start: 2023-09-08 | End: 2023-09-12

## 2023-09-08 RX ORDER — ACETAMINOPHEN 500 MG
650 TABLET ORAL EVERY 6 HOURS
Refills: 0 | Status: COMPLETED | OUTPATIENT
Start: 2023-09-08 | End: 2023-09-10

## 2023-09-08 RX ORDER — FUROSEMIDE 40 MG
10 TABLET ORAL ONCE
Refills: 0 | Status: COMPLETED | OUTPATIENT
Start: 2023-09-08 | End: 2023-09-08

## 2023-09-08 RX ADMIN — HEPARIN SODIUM 5000 UNIT(S): 5000 INJECTION INTRAVENOUS; SUBCUTANEOUS at 05:58

## 2023-09-08 RX ADMIN — ALBUTEROL 2.5 MILLIGRAM(S): 90 AEROSOL, METERED ORAL at 11:26

## 2023-09-08 RX ADMIN — BUPROPION HYDROCHLORIDE 150 MILLIGRAM(S): 150 TABLET, EXTENDED RELEASE ORAL at 13:16

## 2023-09-08 RX ADMIN — HYDROMORPHONE HYDROCHLORIDE 4 MILLIGRAM(S): 2 INJECTION INTRAMUSCULAR; INTRAVENOUS; SUBCUTANEOUS at 17:52

## 2023-09-08 RX ADMIN — HYDROMORPHONE HYDROCHLORIDE 4 MILLIGRAM(S): 2 INJECTION INTRAMUSCULAR; INTRAVENOUS; SUBCUTANEOUS at 17:45

## 2023-09-08 RX ADMIN — HYDROMORPHONE HYDROCHLORIDE 2 MILLIGRAM(S): 2 INJECTION INTRAMUSCULAR; INTRAVENOUS; SUBCUTANEOUS at 10:35

## 2023-09-08 RX ADMIN — SODIUM CHLORIDE 4 MILLILITER(S): 9 INJECTION INTRAMUSCULAR; INTRAVENOUS; SUBCUTANEOUS at 11:26

## 2023-09-08 RX ADMIN — OLANZAPINE 10 MILLIGRAM(S): 15 TABLET, FILM COATED ORAL at 17:05

## 2023-09-08 RX ADMIN — Medication 10 MILLIGRAM(S): at 10:17

## 2023-09-08 RX ADMIN — Medication 100 MILLIGRAM(S): at 17:05

## 2023-09-08 RX ADMIN — HYDROMORPHONE HYDROCHLORIDE 4 MILLIGRAM(S): 2 INJECTION INTRAMUSCULAR; INTRAVENOUS; SUBCUTANEOUS at 21:37

## 2023-09-08 RX ADMIN — HEPARIN SODIUM 5000 UNIT(S): 5000 INJECTION INTRAVENOUS; SUBCUTANEOUS at 13:17

## 2023-09-08 RX ADMIN — LIDOCAINE 1 PATCH: 4 CREAM TOPICAL at 13:16

## 2023-09-08 RX ADMIN — Medication 1 PATCH: at 20:00

## 2023-09-08 RX ADMIN — HYDROMORPHONE HYDROCHLORIDE 4 MILLIGRAM(S): 2 INJECTION INTRAMUSCULAR; INTRAVENOUS; SUBCUTANEOUS at 22:00

## 2023-09-08 RX ADMIN — Medication 1000 MILLIGRAM(S): at 03:00

## 2023-09-08 RX ADMIN — HEPARIN SODIUM 5000 UNIT(S): 5000 INJECTION INTRAVENOUS; SUBCUTANEOUS at 21:12

## 2023-09-08 RX ADMIN — LIDOCAINE 1 PATCH: 4 CREAM TOPICAL at 00:24

## 2023-09-08 RX ADMIN — SENNA PLUS 2 TABLET(S): 8.6 TABLET ORAL at 21:12

## 2023-09-08 RX ADMIN — POLYETHYLENE GLYCOL 3350 17 GRAM(S): 17 POWDER, FOR SOLUTION ORAL at 13:17

## 2023-09-08 RX ADMIN — ATORVASTATIN CALCIUM 80 MILLIGRAM(S): 80 TABLET, FILM COATED ORAL at 21:13

## 2023-09-08 RX ADMIN — ALBUTEROL 2.5 MILLIGRAM(S): 90 AEROSOL, METERED ORAL at 22:56

## 2023-09-08 RX ADMIN — Medication 1 PATCH: at 13:15

## 2023-09-08 RX ADMIN — ALBUTEROL 2.5 MILLIGRAM(S): 90 AEROSOL, METERED ORAL at 04:24

## 2023-09-08 RX ADMIN — Medication 1000 MILLIGRAM(S): at 09:15

## 2023-09-08 RX ADMIN — Medication 1 PATCH: at 07:10

## 2023-09-08 RX ADMIN — Medication 650 MILLIGRAM(S): at 17:09

## 2023-09-08 RX ADMIN — HYDROMORPHONE HYDROCHLORIDE 2 MILLIGRAM(S): 2 INJECTION INTRAMUSCULAR; INTRAVENOUS; SUBCUTANEOUS at 14:20

## 2023-09-08 RX ADMIN — SODIUM CHLORIDE 30 MILLILITER(S): 9 INJECTION, SOLUTION INTRAVENOUS at 08:39

## 2023-09-08 RX ADMIN — LAMOTRIGINE 25 MILLIGRAM(S): 25 TABLET, ORALLY DISINTEGRATING ORAL at 10:17

## 2023-09-08 RX ADMIN — LIDOCAINE 1 PATCH: 4 CREAM TOPICAL at 20:00

## 2023-09-08 RX ADMIN — Medication 1 PATCH: at 12:00

## 2023-09-08 RX ADMIN — LAMOTRIGINE 125 MILLIGRAM(S): 25 TABLET, ORALLY DISINTEGRATING ORAL at 13:16

## 2023-09-08 RX ADMIN — Medication 400 MILLIGRAM(S): at 08:40

## 2023-09-08 RX ADMIN — CARVEDILOL PHOSPHATE 3.12 MILLIGRAM(S): 80 CAPSULE, EXTENDED RELEASE ORAL at 21:12

## 2023-09-08 RX ADMIN — SODIUM CHLORIDE 4 MILLILITER(S): 9 INJECTION INTRAMUSCULAR; INTRAVENOUS; SUBCUTANEOUS at 22:56

## 2023-09-08 RX ADMIN — HYDROMORPHONE HYDROCHLORIDE 2 MILLIGRAM(S): 2 INJECTION INTRAMUSCULAR; INTRAVENOUS; SUBCUTANEOUS at 13:20

## 2023-09-08 RX ADMIN — Medication 81 MILLIGRAM(S): at 21:13

## 2023-09-08 RX ADMIN — Medication 400 MILLIGRAM(S): at 02:41

## 2023-09-08 RX ADMIN — HYDROMORPHONE HYDROCHLORIDE 2 MILLIGRAM(S): 2 INJECTION INTRAMUSCULAR; INTRAVENOUS; SUBCUTANEOUS at 09:35

## 2023-09-08 RX ADMIN — Medication 650 MILLIGRAM(S): at 17:08

## 2023-09-08 NOTE — PHYSICAL THERAPY INITIAL EVALUATION ADULT - PERTINENT HX OF CURRENT PROBLEM, REHAB EVAL
Pt is a 73 year old female with a past medical history of CAD x 2 stents, , Bipolar Disease, Osteoarthritis, Patient presents preop dx of pulmonary solitary nodule. Patient is s/p for right video thoracic surgery , wedge resection of right upper lobe nodule on 9/7.

## 2023-09-08 NOTE — PHYSICAL THERAPY INITIAL EVALUATION ADULT - GENERAL OBSERVATIONS, REHAB EVAL
Pt received semi supine in bed in NAD, all lines intact + telemetry, nasal canula, HR 83, Bp 112/69.

## 2023-09-08 NOTE — PHYSICAL THERAPY INITIAL EVALUATION ADULT - ADDITIONAL COMMENTS
Pt lives alone in an apartment with 2 flights of steps to enter. Pt did not use an assistive device and was independent with ADLs prior. Pt reports 1 fall in the past 6 months.   Pt left sitting in bedside chair in NAD, all lines intact, call [place] in reach and KRISTEN Roberts at bedside.

## 2023-09-09 LAB
ANION GAP SERPL CALC-SCNC: 10 MMOL/L — SIGNIFICANT CHANGE UP (ref 7–14)
BUN SERPL-MCNC: 13 MG/DL — SIGNIFICANT CHANGE UP (ref 7–23)
CALCIUM SERPL-MCNC: 9.1 MG/DL — SIGNIFICANT CHANGE UP (ref 8.4–10.5)
CHLORIDE SERPL-SCNC: 103 MMOL/L — SIGNIFICANT CHANGE UP (ref 98–107)
CO2 SERPL-SCNC: 26 MMOL/L — SIGNIFICANT CHANGE UP (ref 22–31)
CREAT SERPL-MCNC: 0.64 MG/DL — SIGNIFICANT CHANGE UP (ref 0.5–1.3)
EGFR: 94 ML/MIN/1.73M2 — SIGNIFICANT CHANGE UP
GLUCOSE SERPL-MCNC: 125 MG/DL — HIGH (ref 70–99)
HCT VFR BLD CALC: 38.7 % — SIGNIFICANT CHANGE UP (ref 34.5–45)
HGB BLD-MCNC: 13.1 G/DL — SIGNIFICANT CHANGE UP (ref 11.5–15.5)
MAGNESIUM SERPL-MCNC: 2.1 MG/DL — SIGNIFICANT CHANGE UP (ref 1.6–2.6)
MCHC RBC-ENTMCNC: 29.7 PG — SIGNIFICANT CHANGE UP (ref 27–34)
MCHC RBC-ENTMCNC: 33.9 GM/DL — SIGNIFICANT CHANGE UP (ref 32–36)
MCV RBC AUTO: 87.8 FL — SIGNIFICANT CHANGE UP (ref 80–100)
NRBC # BLD: 0 /100 WBCS — SIGNIFICANT CHANGE UP (ref 0–0)
NRBC # FLD: 0 K/UL — SIGNIFICANT CHANGE UP (ref 0–0)
PHOSPHATE SERPL-MCNC: 2.4 MG/DL — LOW (ref 2.5–4.5)
PLATELET # BLD AUTO: 185 K/UL — SIGNIFICANT CHANGE UP (ref 150–400)
POTASSIUM SERPL-MCNC: 4.2 MMOL/L — SIGNIFICANT CHANGE UP (ref 3.5–5.3)
POTASSIUM SERPL-SCNC: 4.2 MMOL/L — SIGNIFICANT CHANGE UP (ref 3.5–5.3)
RBC # BLD: 4.41 M/UL — SIGNIFICANT CHANGE UP (ref 3.8–5.2)
RBC # FLD: 12.4 % — SIGNIFICANT CHANGE UP (ref 10.3–14.5)
SODIUM SERPL-SCNC: 139 MMOL/L — SIGNIFICANT CHANGE UP (ref 135–145)
WBC # BLD: 11.03 K/UL — HIGH (ref 3.8–10.5)
WBC # FLD AUTO: 11.03 K/UL — HIGH (ref 3.8–10.5)

## 2023-09-09 PROCEDURE — 71045 X-RAY EXAM CHEST 1 VIEW: CPT | Mod: 26

## 2023-09-09 PROCEDURE — 99233 SBSQ HOSP IP/OBS HIGH 50: CPT

## 2023-09-09 RX ORDER — CLOPIDOGREL BISULFATE 75 MG/1
75 TABLET, FILM COATED ORAL DAILY
Refills: 0 | Status: DISCONTINUED | OUTPATIENT
Start: 2023-09-09 | End: 2023-09-12

## 2023-09-09 RX ORDER — GABAPENTIN 400 MG/1
100 CAPSULE ORAL EVERY 8 HOURS
Refills: 0 | Status: DISCONTINUED | OUTPATIENT
Start: 2023-09-09 | End: 2023-09-12

## 2023-09-09 RX ADMIN — Medication 650 MILLIGRAM(S): at 05:23

## 2023-09-09 RX ADMIN — GABAPENTIN 100 MILLIGRAM(S): 400 CAPSULE ORAL at 21:53

## 2023-09-09 RX ADMIN — LAMOTRIGINE 25 MILLIGRAM(S): 25 TABLET, ORALLY DISINTEGRATING ORAL at 11:24

## 2023-09-09 RX ADMIN — Medication 650 MILLIGRAM(S): at 17:53

## 2023-09-09 RX ADMIN — Medication 1 PATCH: at 19:00

## 2023-09-09 RX ADMIN — Medication 100 MILLIGRAM(S): at 12:43

## 2023-09-09 RX ADMIN — ATORVASTATIN CALCIUM 80 MILLIGRAM(S): 80 TABLET, FILM COATED ORAL at 21:53

## 2023-09-09 RX ADMIN — HYDROMORPHONE HYDROCHLORIDE 4 MILLIGRAM(S): 2 INJECTION INTRAMUSCULAR; INTRAVENOUS; SUBCUTANEOUS at 21:53

## 2023-09-09 RX ADMIN — HYDROMORPHONE HYDROCHLORIDE 0.5 MILLIGRAM(S): 2 INJECTION INTRAMUSCULAR; INTRAVENOUS; SUBCUTANEOUS at 13:10

## 2023-09-09 RX ADMIN — HYDROMORPHONE HYDROCHLORIDE 4 MILLIGRAM(S): 2 INJECTION INTRAMUSCULAR; INTRAVENOUS; SUBCUTANEOUS at 10:27

## 2023-09-09 RX ADMIN — OLANZAPINE 10 MILLIGRAM(S): 15 TABLET, FILM COATED ORAL at 17:53

## 2023-09-09 RX ADMIN — HYDROMORPHONE HYDROCHLORIDE 4 MILLIGRAM(S): 2 INJECTION INTRAMUSCULAR; INTRAVENOUS; SUBCUTANEOUS at 22:23

## 2023-09-09 RX ADMIN — LIDOCAINE 1 PATCH: 4 CREAM TOPICAL at 01:16

## 2023-09-09 RX ADMIN — ALBUTEROL 2.5 MILLIGRAM(S): 90 AEROSOL, METERED ORAL at 15:42

## 2023-09-09 RX ADMIN — LIDOCAINE 1 PATCH: 4 CREAM TOPICAL at 12:33

## 2023-09-09 RX ADMIN — CARVEDILOL PHOSPHATE 3.12 MILLIGRAM(S): 80 CAPSULE, EXTENDED RELEASE ORAL at 05:08

## 2023-09-09 RX ADMIN — Medication 81 MILLIGRAM(S): at 11:26

## 2023-09-09 RX ADMIN — HYDROMORPHONE HYDROCHLORIDE 4 MILLIGRAM(S): 2 INJECTION INTRAMUSCULAR; INTRAVENOUS; SUBCUTANEOUS at 18:02

## 2023-09-09 RX ADMIN — Medication 650 MILLIGRAM(S): at 13:15

## 2023-09-09 RX ADMIN — CLOPIDOGREL BISULFATE 75 MILLIGRAM(S): 75 TABLET, FILM COATED ORAL at 12:35

## 2023-09-09 RX ADMIN — CARVEDILOL PHOSPHATE 3.12 MILLIGRAM(S): 80 CAPSULE, EXTENDED RELEASE ORAL at 17:53

## 2023-09-09 RX ADMIN — Medication 650 MILLIGRAM(S): at 23:00

## 2023-09-09 RX ADMIN — Medication 650 MILLIGRAM(S): at 18:50

## 2023-09-09 RX ADMIN — Medication 650 MILLIGRAM(S): at 12:34

## 2023-09-09 RX ADMIN — LAMOTRIGINE 125 MILLIGRAM(S): 25 TABLET, ORALLY DISINTEGRATING ORAL at 12:35

## 2023-09-09 RX ADMIN — SENNA PLUS 2 TABLET(S): 8.6 TABLET ORAL at 21:53

## 2023-09-09 RX ADMIN — Medication 1 PATCH: at 12:44

## 2023-09-09 RX ADMIN — SODIUM CHLORIDE 4 MILLILITER(S): 9 INJECTION INTRAMUSCULAR; INTRAVENOUS; SUBCUTANEOUS at 22:29

## 2023-09-09 RX ADMIN — ALBUTEROL 2.5 MILLIGRAM(S): 90 AEROSOL, METERED ORAL at 22:17

## 2023-09-09 RX ADMIN — HEPARIN SODIUM 5000 UNIT(S): 5000 INJECTION INTRAVENOUS; SUBCUTANEOUS at 17:53

## 2023-09-09 RX ADMIN — LIDOCAINE 1 PATCH: 4 CREAM TOPICAL at 19:00

## 2023-09-09 RX ADMIN — ALBUTEROL 2.5 MILLIGRAM(S): 90 AEROSOL, METERED ORAL at 04:26

## 2023-09-09 RX ADMIN — GABAPENTIN 100 MILLIGRAM(S): 400 CAPSULE ORAL at 17:54

## 2023-09-09 RX ADMIN — Medication 1 PATCH: at 07:22

## 2023-09-09 RX ADMIN — HYDROMORPHONE HYDROCHLORIDE 0.5 MILLIGRAM(S): 2 INJECTION INTRAMUSCULAR; INTRAVENOUS; SUBCUTANEOUS at 12:34

## 2023-09-09 RX ADMIN — BUPROPION HYDROCHLORIDE 150 MILLIGRAM(S): 150 TABLET, EXTENDED RELEASE ORAL at 11:23

## 2023-09-09 RX ADMIN — HYDROMORPHONE HYDROCHLORIDE 2 MILLIGRAM(S): 2 INJECTION INTRAMUSCULAR; INTRAVENOUS; SUBCUTANEOUS at 07:22

## 2023-09-09 RX ADMIN — Medication 1 PATCH: at 11:26

## 2023-09-09 RX ADMIN — HEPARIN SODIUM 5000 UNIT(S): 5000 INJECTION INTRAVENOUS; SUBCUTANEOUS at 05:08

## 2023-09-10 ENCOUNTER — TRANSCRIPTION ENCOUNTER (OUTPATIENT)
Age: 73
End: 2023-09-10

## 2023-09-10 LAB
ANION GAP SERPL CALC-SCNC: 9 MMOL/L — SIGNIFICANT CHANGE UP (ref 7–14)
BUN SERPL-MCNC: 11 MG/DL — SIGNIFICANT CHANGE UP (ref 7–23)
CALCIUM SERPL-MCNC: 9 MG/DL — SIGNIFICANT CHANGE UP (ref 8.4–10.5)
CHLORIDE SERPL-SCNC: 105 MMOL/L — SIGNIFICANT CHANGE UP (ref 98–107)
CO2 SERPL-SCNC: 26 MMOL/L — SIGNIFICANT CHANGE UP (ref 22–31)
CREAT SERPL-MCNC: 0.57 MG/DL — SIGNIFICANT CHANGE UP (ref 0.5–1.3)
EGFR: 96 ML/MIN/1.73M2 — SIGNIFICANT CHANGE UP
GLUCOSE SERPL-MCNC: 112 MG/DL — HIGH (ref 70–99)
HCT VFR BLD CALC: 35.9 % — SIGNIFICANT CHANGE UP (ref 34.5–45)
HGB BLD-MCNC: 12.1 G/DL — SIGNIFICANT CHANGE UP (ref 11.5–15.5)
MCHC RBC-ENTMCNC: 29.3 PG — SIGNIFICANT CHANGE UP (ref 27–34)
MCHC RBC-ENTMCNC: 33.7 GM/DL — SIGNIFICANT CHANGE UP (ref 32–36)
MCV RBC AUTO: 86.9 FL — SIGNIFICANT CHANGE UP (ref 80–100)
NRBC # BLD: 0 /100 WBCS — SIGNIFICANT CHANGE UP (ref 0–0)
NRBC # FLD: 0 K/UL — SIGNIFICANT CHANGE UP (ref 0–0)
PLATELET # BLD AUTO: 191 K/UL — SIGNIFICANT CHANGE UP (ref 150–400)
POTASSIUM SERPL-MCNC: 4.1 MMOL/L — SIGNIFICANT CHANGE UP (ref 3.5–5.3)
POTASSIUM SERPL-SCNC: 4.1 MMOL/L — SIGNIFICANT CHANGE UP (ref 3.5–5.3)
RBC # BLD: 4.13 M/UL — SIGNIFICANT CHANGE UP (ref 3.8–5.2)
RBC # FLD: 12.5 % — SIGNIFICANT CHANGE UP (ref 10.3–14.5)
SODIUM SERPL-SCNC: 140 MMOL/L — SIGNIFICANT CHANGE UP (ref 135–145)
WBC # BLD: 8.15 K/UL — SIGNIFICANT CHANGE UP (ref 3.8–10.5)
WBC # FLD AUTO: 8.15 K/UL — SIGNIFICANT CHANGE UP (ref 3.8–10.5)

## 2023-09-10 PROCEDURE — 71045 X-RAY EXAM CHEST 1 VIEW: CPT | Mod: 26

## 2023-09-10 RX ORDER — LIDOCAINE 4 G/100G
1 CREAM TOPICAL
Qty: 0 | Refills: 0 | DISCHARGE
Start: 2023-09-10

## 2023-09-10 RX ORDER — POLYETHYLENE GLYCOL 3350 17 G/17G
17 POWDER, FOR SOLUTION ORAL
Qty: 0 | Refills: 0 | DISCHARGE
Start: 2023-09-10

## 2023-09-10 RX ORDER — GABAPENTIN 400 MG/1
1 CAPSULE ORAL
Qty: 21 | Refills: 0
Start: 2023-09-10 | End: 2023-09-16

## 2023-09-10 RX ORDER — SENNA PLUS 8.6 MG/1
2 TABLET ORAL
Qty: 0 | Refills: 0 | DISCHARGE
Start: 2023-09-10

## 2023-09-10 RX ORDER — ACETAMINOPHEN 500 MG
2 TABLET ORAL
Qty: 0 | Refills: 0 | DISCHARGE
Start: 2023-09-10

## 2023-09-10 RX ORDER — HYDROMORPHONE HYDROCHLORIDE 2 MG/ML
1 INJECTION INTRAMUSCULAR; INTRAVENOUS; SUBCUTANEOUS
Qty: 30 | Refills: 0
Start: 2023-09-10 | End: 2023-09-14

## 2023-09-10 RX ADMIN — ATORVASTATIN CALCIUM 80 MILLIGRAM(S): 80 TABLET, FILM COATED ORAL at 21:09

## 2023-09-10 RX ADMIN — Medication 650 MILLIGRAM(S): at 05:36

## 2023-09-10 RX ADMIN — GABAPENTIN 100 MILLIGRAM(S): 400 CAPSULE ORAL at 21:08

## 2023-09-10 RX ADMIN — OLANZAPINE 10 MILLIGRAM(S): 15 TABLET, FILM COATED ORAL at 17:47

## 2023-09-10 RX ADMIN — CARVEDILOL PHOSPHATE 3.12 MILLIGRAM(S): 80 CAPSULE, EXTENDED RELEASE ORAL at 17:47

## 2023-09-10 RX ADMIN — SODIUM CHLORIDE 4 MILLILITER(S): 9 INJECTION INTRAMUSCULAR; INTRAVENOUS; SUBCUTANEOUS at 09:52

## 2023-09-10 RX ADMIN — LIDOCAINE 1 PATCH: 4 CREAM TOPICAL at 00:00

## 2023-09-10 RX ADMIN — HEPARIN SODIUM 5000 UNIT(S): 5000 INJECTION INTRAVENOUS; SUBCUTANEOUS at 17:46

## 2023-09-10 RX ADMIN — Medication 650 MILLIGRAM(S): at 05:06

## 2023-09-10 RX ADMIN — HYDROMORPHONE HYDROCHLORIDE 4 MILLIGRAM(S): 2 INJECTION INTRAMUSCULAR; INTRAVENOUS; SUBCUTANEOUS at 19:27

## 2023-09-10 RX ADMIN — HYDROMORPHONE HYDROCHLORIDE 4 MILLIGRAM(S): 2 INJECTION INTRAMUSCULAR; INTRAVENOUS; SUBCUTANEOUS at 05:36

## 2023-09-10 RX ADMIN — BUPROPION HYDROCHLORIDE 150 MILLIGRAM(S): 150 TABLET, EXTENDED RELEASE ORAL at 12:22

## 2023-09-10 RX ADMIN — CLOPIDOGREL BISULFATE 75 MILLIGRAM(S): 75 TABLET, FILM COATED ORAL at 12:22

## 2023-09-10 RX ADMIN — SENNA PLUS 2 TABLET(S): 8.6 TABLET ORAL at 21:09

## 2023-09-10 RX ADMIN — LAMOTRIGINE 25 MILLIGRAM(S): 25 TABLET, ORALLY DISINTEGRATING ORAL at 08:21

## 2023-09-10 RX ADMIN — LIDOCAINE 1 PATCH: 4 CREAM TOPICAL at 19:00

## 2023-09-10 RX ADMIN — HYDROMORPHONE HYDROCHLORIDE 4 MILLIGRAM(S): 2 INJECTION INTRAMUSCULAR; INTRAVENOUS; SUBCUTANEOUS at 19:57

## 2023-09-10 RX ADMIN — Medication 1 PATCH: at 19:00

## 2023-09-10 RX ADMIN — GABAPENTIN 100 MILLIGRAM(S): 400 CAPSULE ORAL at 14:46

## 2023-09-10 RX ADMIN — ALBUTEROL 2.5 MILLIGRAM(S): 90 AEROSOL, METERED ORAL at 09:52

## 2023-09-10 RX ADMIN — GABAPENTIN 100 MILLIGRAM(S): 400 CAPSULE ORAL at 05:05

## 2023-09-10 RX ADMIN — SODIUM CHLORIDE 4 MILLILITER(S): 9 INJECTION INTRAMUSCULAR; INTRAVENOUS; SUBCUTANEOUS at 22:24

## 2023-09-10 RX ADMIN — Medication 1 PATCH: at 12:19

## 2023-09-10 RX ADMIN — Medication 650 MILLIGRAM(S): at 13:20

## 2023-09-10 RX ADMIN — CARVEDILOL PHOSPHATE 3.12 MILLIGRAM(S): 80 CAPSULE, EXTENDED RELEASE ORAL at 05:05

## 2023-09-10 RX ADMIN — POLYETHYLENE GLYCOL 3350 17 GRAM(S): 17 POWDER, FOR SOLUTION ORAL at 21:08

## 2023-09-10 RX ADMIN — Medication 100 MILLIGRAM(S): at 12:22

## 2023-09-10 RX ADMIN — Medication 650 MILLIGRAM(S): at 12:20

## 2023-09-10 RX ADMIN — ALBUTEROL 2.5 MILLIGRAM(S): 90 AEROSOL, METERED ORAL at 22:24

## 2023-09-10 RX ADMIN — LIDOCAINE 1 PATCH: 4 CREAM TOPICAL at 12:20

## 2023-09-10 RX ADMIN — HYDROMORPHONE HYDROCHLORIDE 4 MILLIGRAM(S): 2 INJECTION INTRAMUSCULAR; INTRAVENOUS; SUBCUTANEOUS at 05:05

## 2023-09-10 RX ADMIN — LAMOTRIGINE 125 MILLIGRAM(S): 25 TABLET, ORALLY DISINTEGRATING ORAL at 12:21

## 2023-09-10 RX ADMIN — Medication 81 MILLIGRAM(S): at 12:20

## 2023-09-10 NOTE — DISCHARGE NOTE PROVIDER - NSDCCPTREATMENT_GEN_ALL_CORE_FT
PRINCIPAL PROCEDURE  Procedure: VATS wedge resection of right upper lobe of lung  Findings and Treatment:       SECONDARY PROCEDURE  Procedure: Bronchoscopy, flexible, with VATS and lung biopsy  Findings and Treatment:

## 2023-09-10 NOTE — DISCHARGE NOTE PROVIDER - CARE PROVIDER_API CALL
Janes Hillman  Thoracic Surgery  [address] [place] [id number]  Phone: [phone]  Fax: [phone]  Established Patient  Follow Up Time:

## 2023-09-10 NOTE — DISCHARGE NOTE PROVIDER - NSDCCPCAREPLAN_GEN_ALL_CORE_FT
PRINCIPAL DISCHARGE DIAGNOSIS  Diagnosis: Solitary pulmonary nodule  Assessment and Plan of Treatment:

## 2023-09-10 NOTE — DISCHARGE NOTE PROVIDER - HOSPITAL COURSE
73 year old female with hx of CAD x 2 stents, , Bipolar Disease, Osteoarthritis underwent aFB, R VATS, RUL wedge resection, MLND for a pulmonary nodule.  Plavix was resumed after the chest tube was removed POD 1.  Pt required supplemental O2 even while at rest despite pain medications and was discharged after home O2 was arranged. 73 year old female with hx of CAD x 2 stents, , Bipolar Disease, Osteoarthritis underwent aFB, R VATS, RUL wedge resection, MLND for a pulmonary nodule.    Plavix was resumed after the chest tube was removed POD 1.   Pain controlled and oxygen weaned. Pt stable for discharge home to follow up as an outpatient.  Pt seen and examined today by covering attending DR Guzman and deemed stable for discharge.    Vital Signs Last 24 Hrs  T(C): 36.4 (10 Sep 2023 05:00), Max: 36.7 (09 Sep 2023 20:00)  T(F): 97.6 (10 Sep 2023 05:00), Max: 98.1 (09 Sep 2023 20:00)  HR: 86 (10 Sep 2023 05:00) (82 - 89)  BP: 140/67 (10 Sep 2023 05:00) (106/52 - 140/67)  BP(mean): --  RR: 18 (10 Sep 2023 05:00) (17 - 18)  SpO2: 90% (10 Sep 2023 05:00) (90% - 98%)    Parameters below as of 10 Sep 2023 05:00  Patient On (Oxygen Delivery Method): room air    PE  General:  NAD  Neurology: A&Ox3, nonfocal, PEREZ x 4  Respiratory: CTA B/L  CV: RRR, S1S2, no murmurs, rubs or gallops  Abdominal: Soft, NT, ND +BS, Last BM  Extremities: No edema, + peripheral pulses  Incisions: c,d,i         73 year old female with hx of CAD x 2 stents, , Bipolar Disease, Osteoarthritis underwent aFB, R VATS, RUL wedge resection, MLND for a pulmonary nodule.    Plavix was resumed after the chest tube was removed POD 1.   Pain controlled, however her hospital course was complicated by the need for home O2. Once established, VNS and transport were arranged, she was cleared for discharge home with follow up as an outpatient with Dr. Hillman.    ICU Vital Signs Last 24 Hrs  T(C): 36.7 (12 Sep 2023 12:02), Max: 37 (12 Sep 2023 08:26)  T(F): 98.1 (12 Sep 2023 12:02), Max: 98.6 (12 Sep 2023 08:26)  HR: 82 (12 Sep 2023 12:02) (78 - 84)  BP: 116/65 (12 Sep 2023 12:02) (107/47 - 126/57)  BP(mean): --  ABP: --  ABP(mean): --  RR: 18 (12 Sep 2023 12:02) (18 - 18)  SpO2: 91% (12 Sep 2023 12:02) (91% - 98%)    O2 Parameters below as of 12 Sep 2023 12:02  Patient On (Oxygen Delivery Method): room air    PE  General:  NAD  Neurology: A&Ox3, nonfocal, PEREZ x 4  Respiratory: CTA B/L  CV: RRR, S1S2, no murmurs, rubs or gallops  Abdominal: Soft, NT, ND +BS, Last BM  Extremities: No edema, + peripheral pulses  Incisions: c,d,i

## 2023-09-10 NOTE — DISCHARGE NOTE PROVIDER - NSDCMRMEDTOKEN_GEN_ALL_CORE_FT
aspirin 81 mg oral tablet: 1 tab(s) orally once a day (in the morning)  Coreg 3.125 mg oral tablet: 1 tab(s) orally 2 times a day  Cozaar 25 mg oral tablet: 1 tab(s) orally once a day @ 5 PM  Lamictal: 250 mg orally once a day @ 2 PM  naltrexone 50 mg oral tablet: 1 tab(s) orally once a day @ 2 PM  Plavix 75 mg oral tablet: 1 tab(s) orally once a day (in the morning)  Protonix 40 mg oral delayed release tablet: 1 tab(s) orally once a day (in the morning)  rosuvastatin 20 mg oral tablet: 1 tab(s) orally once a day @ 7 PM  traZODone 100 mg oral tablet: 1 tab(s) orally once a day @ 5 PM  Wellbutrin  mg/24 hours oral tablet, extended release: 1 tab(s) orally once a day @ 2 PM  ZyPREXA 10 mg oral tablet: 1 tab(s) orally once a day @ 7 PM   acetaminophen 325 mg oral tablet: 2 tab(s) orally every 6 hours  aspirin 81 mg oral tablet: 1 tab(s) orally once a day (in the morning)  Coreg 3.125 mg oral tablet: 1 tab(s) orally 2 times a day  Cozaar 25 mg oral tablet: 1 tab(s) orally once a day @ 5 PM  CXR PA and lateral: R91.8  gabapentin 100 mg oral capsule: 1 cap(s) orally every 8 hours MDD: 3  HYDROmorphone 2 mg oral tablet: 1 tab(s) orally every 4 hours (5 times/day) as needed for Moderate Pain (4 - 6) MDD: 6  Lamictal: 250 mg orally once a day @ 2 PM  lidocaine 4% topical film: Apply topically to affected area once a day  naltrexone 50 mg oral tablet: 1 tab(s) orally once a day @ 2 PM  nicotine 14 mg/24 hr transdermal film, extended release: 1 patch transdermal once a day  Plavix 75 mg oral tablet: 1 tab(s) orally once a day (in the morning)  polyethylene glycol 3350 oral powder for reconstitution: 17 gram(s) orally once a day As needed Constipation  Protonix 40 mg oral delayed release tablet: 1 tab(s) orally once a day (in the morning)  rosuvastatin 20 mg oral tablet: 1 tab(s) orally once a day @ 7 PM  senna leaf extract oral tablet: 2 tab(s) orally once a day (at bedtime)  traZODone 100 mg oral tablet: 1 tab(s) orally once a day @ 5 PM  Wellbutrin  mg/24 hours oral tablet, extended release: 1 tab(s) orally once a day @ 2 PM  ZyPREXA 10 mg oral tablet: 1 tab(s) orally once a day @ 7 PM   acetaminophen 325 mg oral tablet: 2 tab(s) orally every 6 hours  aspirin 81 mg oral tablet: 1 tab(s) orally once a day (in the morning)  Coreg 3.125 mg oral tablet: 1 tab(s) orally 2 times a day  Cozaar 25 mg oral tablet: 1 tab(s) orally once a day @ 5 PM  CXR PA and lateral: R91.8 MDD: 1  gabapentin 100 mg oral capsule: 1 cap(s) orally every 8 hours MDD: 3  HYDROmorphone 2 mg oral tablet: 1 tab(s) orally every 4 hours (5 times/day) as needed for Moderate Pain (4 - 6) MDD: 6  Lamictal: 250 mg orally once a day @ 2 PM  lidocaine 4% topical film: Apply topically to affected area once a day  naltrexone 50 mg oral tablet: 1 tab(s) orally once a day @ 2 PM  nicotine 14 mg/24 hr transdermal film, extended release: 1 patch transdermal once a day  Plavix 75 mg oral tablet: 1 tab(s) orally once a day (in the morning)  polyethylene glycol 3350 oral powder for reconstitution: 17 gram(s) orally once a day As needed Constipation  Protonix 40 mg oral delayed release tablet: 1 tab(s) orally once a day (in the morning)  rosuvastatin 20 mg oral tablet: 1 tab(s) orally once a day @ 7 PM  senna leaf extract oral tablet: 2 tab(s) orally once a day (at bedtime)  traZODone 100 mg oral tablet: 1 tab(s) orally once a day @ 5 PM  Wellbutrin  mg/24 hours oral tablet, extended release: 1 tab(s) orally once a day @ 2 PM  ZyPREXA 10 mg oral tablet: 1 tab(s) orally once a day @ 7 PM

## 2023-09-10 NOTE — DISCHARGE NOTE PROVIDER - NSDCFUADDAPPT_GEN_ALL_CORE_FT
See Dr Hillman in about 7 to 10 days- call for an appointment and bring a new chest X-ray with you .. See Dr Hillman in about 7 to 10 days-   call for an appointment   have a chest xray prior to appointment and bring films  (you can arrange to have chest xray done on 2nd floor of [facility] before your follow up appointment on 3rd floor - speak with office staff to arrange)

## 2023-09-10 NOTE — DISCHARGE NOTE PROVIDER - NSDCFUADDINST_GEN_ALL_CORE_FT
Remove any chest tube dressings so that you can shower.  Wash with soap and water and then leave the wound open to air as much as possible.  Some drainage is normal but watch for pus, fevers, increasing redness, or difficulty breathing and if noted, call Dr Hillman. Coughing up some dark blood is normal too.  Cough to clear your lungs.  Also continue to use your incentive spirometer.

## 2023-09-10 NOTE — DISCHARGE NOTE PROVIDER - NSDCFUSCHEDAPPT_GEN_ALL_CORE_FT
[facility] [address]  Scheduled Appointment: 09/20/2023     [facility]  [address]  Scheduled Appointment: 12/14/2023    Janes Hillman  [facility] Physician [facility]  [address] [facility]  Scheduled Appointment: 01/03/2024    Mary Garcia  [facility] Physician [facility]  [facility]  Scheduled Appointment: 01/16/2024

## 2023-09-10 NOTE — CHART NOTE - NSCHARTNOTEFT_GEN_A_CORE
pt SpO2 on RA at rest 90%  pt SpO2 on RA ambulating 86%  pt SpO2 on 2L nasal cannula ambulating 92%    pt is diagnosed with COPD and will require continuous home O2 at 2LPM via nasal cannula.  Pt was tested in a chronic stable state.     Kelly OSBORNE [id number]

## 2023-09-11 ENCOUNTER — TRANSCRIPTION ENCOUNTER (OUTPATIENT)
Age: 73
End: 2023-09-11

## 2023-09-11 PROCEDURE — 71045 X-RAY EXAM CHEST 1 VIEW: CPT | Mod: 26

## 2023-09-11 RX ORDER — NICOTINE POLACRILEX 2 MG
1 GUM BUCCAL
Qty: 6 | Refills: 0
Start: 2023-09-11 | End: 2023-09-20

## 2023-09-11 RX ORDER — ACETAMINOPHEN 500 MG
650 TABLET ORAL EVERY 6 HOURS
Refills: 0 | Status: DISCONTINUED | OUTPATIENT
Start: 2023-09-11 | End: 2023-09-12

## 2023-09-11 RX ORDER — MAGNESIUM HYDROXIDE 400 MG/1
30 TABLET, CHEWABLE ORAL DAILY
Refills: 0 | Status: DISCONTINUED | OUTPATIENT
Start: 2023-09-11 | End: 2023-09-12

## 2023-09-11 RX ORDER — POLYETHYLENE GLYCOL 3350 17 G/17G
17 POWDER, FOR SOLUTION ORAL DAILY
Refills: 0 | Status: DISCONTINUED | OUTPATIENT
Start: 2023-09-11 | End: 2023-09-12

## 2023-09-11 RX ADMIN — Medication 650 MILLIGRAM(S): at 14:44

## 2023-09-11 RX ADMIN — GABAPENTIN 100 MILLIGRAM(S): 400 CAPSULE ORAL at 13:44

## 2023-09-11 RX ADMIN — ALBUTEROL 2.5 MILLIGRAM(S): 90 AEROSOL, METERED ORAL at 15:04

## 2023-09-11 RX ADMIN — SENNA PLUS 2 TABLET(S): 8.6 TABLET ORAL at 21:10

## 2023-09-11 RX ADMIN — CARVEDILOL PHOSPHATE 3.12 MILLIGRAM(S): 80 CAPSULE, EXTENDED RELEASE ORAL at 18:31

## 2023-09-11 RX ADMIN — LIDOCAINE 1 PATCH: 4 CREAM TOPICAL at 00:00

## 2023-09-11 RX ADMIN — HYDROMORPHONE HYDROCHLORIDE 4 MILLIGRAM(S): 2 INJECTION INTRAMUSCULAR; INTRAVENOUS; SUBCUTANEOUS at 05:50

## 2023-09-11 RX ADMIN — Medication 650 MILLIGRAM(S): at 09:23

## 2023-09-11 RX ADMIN — SODIUM CHLORIDE 4 MILLILITER(S): 9 INJECTION INTRAMUSCULAR; INTRAVENOUS; SUBCUTANEOUS at 08:32

## 2023-09-11 RX ADMIN — Medication 650 MILLIGRAM(S): at 08:28

## 2023-09-11 RX ADMIN — Medication 1 PATCH: at 12:03

## 2023-09-11 RX ADMIN — ALBUTEROL 2.5 MILLIGRAM(S): 90 AEROSOL, METERED ORAL at 22:26

## 2023-09-11 RX ADMIN — Medication 1 PATCH: at 20:15

## 2023-09-11 RX ADMIN — Medication 1 PATCH: at 12:12

## 2023-09-11 RX ADMIN — ALBUTEROL 2.5 MILLIGRAM(S): 90 AEROSOL, METERED ORAL at 08:32

## 2023-09-11 RX ADMIN — Medication 650 MILLIGRAM(S): at 19:00

## 2023-09-11 RX ADMIN — Medication 100 MILLIGRAM(S): at 12:02

## 2023-09-11 RX ADMIN — Medication 650 MILLIGRAM(S): at 18:29

## 2023-09-11 RX ADMIN — LIDOCAINE 1 PATCH: 4 CREAM TOPICAL at 12:04

## 2023-09-11 RX ADMIN — BUPROPION HYDROCHLORIDE 150 MILLIGRAM(S): 150 TABLET, EXTENDED RELEASE ORAL at 13:45

## 2023-09-11 RX ADMIN — ALBUTEROL 2.5 MILLIGRAM(S): 90 AEROSOL, METERED ORAL at 04:19

## 2023-09-11 RX ADMIN — Medication 81 MILLIGRAM(S): at 12:03

## 2023-09-11 RX ADMIN — CARVEDILOL PHOSPHATE 3.12 MILLIGRAM(S): 80 CAPSULE, EXTENDED RELEASE ORAL at 05:20

## 2023-09-11 RX ADMIN — GABAPENTIN 100 MILLIGRAM(S): 400 CAPSULE ORAL at 21:11

## 2023-09-11 RX ADMIN — LAMOTRIGINE 25 MILLIGRAM(S): 25 TABLET, ORALLY DISINTEGRATING ORAL at 12:03

## 2023-09-11 RX ADMIN — Medication 650 MILLIGRAM(S): at 13:43

## 2023-09-11 RX ADMIN — CLOPIDOGREL BISULFATE 75 MILLIGRAM(S): 75 TABLET, FILM COATED ORAL at 12:02

## 2023-09-11 RX ADMIN — SODIUM CHLORIDE 4 MILLILITER(S): 9 INJECTION INTRAMUSCULAR; INTRAVENOUS; SUBCUTANEOUS at 22:26

## 2023-09-11 RX ADMIN — HYDROMORPHONE HYDROCHLORIDE 4 MILLIGRAM(S): 2 INJECTION INTRAMUSCULAR; INTRAVENOUS; SUBCUTANEOUS at 05:20

## 2023-09-11 RX ADMIN — ATORVASTATIN CALCIUM 80 MILLIGRAM(S): 80 TABLET, FILM COATED ORAL at 21:11

## 2023-09-11 RX ADMIN — POLYETHYLENE GLYCOL 3350 17 GRAM(S): 17 POWDER, FOR SOLUTION ORAL at 13:43

## 2023-09-11 RX ADMIN — GABAPENTIN 100 MILLIGRAM(S): 400 CAPSULE ORAL at 05:19

## 2023-09-11 RX ADMIN — LIDOCAINE 1 PATCH: 4 CREAM TOPICAL at 20:15

## 2023-09-11 RX ADMIN — HYDROMORPHONE HYDROCHLORIDE 4 MILLIGRAM(S): 2 INJECTION INTRAMUSCULAR; INTRAVENOUS; SUBCUTANEOUS at 23:28

## 2023-09-11 RX ADMIN — LAMOTRIGINE 125 MILLIGRAM(S): 25 TABLET, ORALLY DISINTEGRATING ORAL at 13:44

## 2023-09-11 RX ADMIN — OLANZAPINE 10 MILLIGRAM(S): 15 TABLET, FILM COATED ORAL at 18:30

## 2023-09-11 NOTE — PROGRESS NOTE ADULT - SUBJECTIVE AND OBJECTIVE BOX
Anesthesia Pain Management Service    SUBJECTIVE: Patient reports some pain at the chest tube site, reports the IV PCA helps with the pain. Denies taking opioids at home, Reports taking Zyprexa at home. Patient states she has anxiety and feels slightly anxious at this time. Denies alcohol use, smoking and illicit drug use.  Pain Scale Score	At rest: _5/10__ 	With Activity: ___ 	[X ] Refer to charted pain scores    THERAPY:    [ ] IV PCA Morphine		[ ] 5 mg/mL	[ ] 1 mg/mL  [X ] IV PCA Hydromorphone	[ ] 5 mg/mL	[X ] 1 mg/mL  [ ] IV PCA Fentanyl		[ ] 50 micrograms/mL    Demand dose __0.2_ lockout __6_ (minutes) Continuous Rate _0__ Total: 19.8___   mg used (in past 24 hrs)      MEDICATIONS  (STANDING):  acetaminophen     Tablet .. 650 milliGRAM(s) Oral every 6 hours  albuterol    0.083% 2.5 milliGRAM(s) Nebulizer every 6 hours  atorvastatin 80 milliGRAM(s) Oral at bedtime  buPROPion XL (24-Hour) . 150 milliGRAM(s) Oral daily  heparin   Injectable 5000 Unit(s) SubCutaneous every 8 hours  lamoTRIgine 25 milliGRAM(s) Oral daily  lamoTRIgine 125 milliGRAM(s) Oral daily  lidocaine   4% Patch 1 Patch Transdermal every 24 hours  nicotine -  14 mG/24Hr(s) Patch 1 Patch Transdermal daily  OLANZapine 10 milliGRAM(s) Oral daily  senna 2 Tablet(s) Oral at bedtime  sodium chloride 3%  Inhalation 4 milliLiter(s) Inhalation every 12 hours  traZODone 100 milliGRAM(s) Oral daily    MEDICATIONS  (PRN):  HYDROmorphone   Tablet 2 milliGRAM(s) Oral every 3 hours PRN Moderate Pain (4 - 6)  HYDROmorphone   Tablet 4 milliGRAM(s) Oral every 3 hours PRN Severe Pain (7 - 10)  HYDROmorphone  Injectable 0.5 milliGRAM(s) IV Push every 3 hours PRN Severe Breakthrough Pain (7 - 10)  polyethylene glycol 3350 17 Gram(s) Oral daily PRN Constipation      OBJECTIVE: Patient sitting up in bed. CTX1 in place.     Sedation Score:	[ X] Alert	[ ] Drowsy 	[ ] Arousable	[ ] Asleep	[ ] Unresponsive    Side Effects:	[X ] None	[ ] Nausea	[ ] Vomiting	[ ] Pruritus  		[ ] Other:    Vital Signs Last 24 Hrs  T(C): 36.8 (08 Sep 2023 08:00), Max: 37.1 (08 Sep 2023 00:00)  T(F): 98.2 (08 Sep 2023 08:00), Max: 98.7 (08 Sep 2023 00:00)  HR: 83 (08 Sep 2023 09:00) (74 - 94)  BP: 112/69 (08 Sep 2023 09:00) (102/58 - 163/99)  BP(mean): 82 (08 Sep 2023 09:00) (61 - 109)  RR: 24 (08 Sep 2023 09:10) (15 - 31)  SpO2: 94% (08 Sep 2023 09:10) (86% - 100%)    Parameters below as of 08 Sep 2023 09:10  Patient On (Oxygen Delivery Method): nasal cannula w/ humidification  O2 Flow (L/min): 2      ASSESSMENT/ PLAN    Therapy to  be:	[ ] Continue   [ X] Discontinued   [X ] Change to prn Analgesics    Documentation and Verification of current medications:   [X] Done	[ ] Not done, not elligible    Comments: IV PCA discontinued. Plan discussed with primary team.  Recommend Psychiatry consult for anxiety if not managed with the current home medications. PRN Oral/IV opioids and/or Adjuvant non-opioid medication to be ordered at this point.  I STOP reviewed and no medications found.    Progress Note written now but Patient was seen earlier.
Anesthesia Pain Management Service- Attending Addendum    SUBJECTIVE: Patient's pain control adequate    Therapy:	  [ X] IV PCA	   [ ] Epidural           [ ] s/p Spinal Opoid              [ ] Postpartum infusion	  [ ] Patient controlled regional anesthesia (PCRA)    [ ] prn Analgesics    Allergies    haloperidol (Other)  Cogentin (Other)  codeine (Unknown)    Intolerances      MEDICATIONS  (STANDING):  acetaminophen     Tablet .. 650 milliGRAM(s) Oral every 6 hours  albuterol    0.083% 2.5 milliGRAM(s) Nebulizer every 6 hours  atorvastatin 80 milliGRAM(s) Oral at bedtime  buPROPion XL (24-Hour) . 150 milliGRAM(s) Oral daily  heparin   Injectable 5000 Unit(s) SubCutaneous every 8 hours  lamoTRIgine 125 milliGRAM(s) Oral daily  lamoTRIgine 25 milliGRAM(s) Oral daily  lidocaine   4% Patch 1 Patch Transdermal every 24 hours  nicotine -  14 mG/24Hr(s) Patch 1 Patch Transdermal daily  OLANZapine 10 milliGRAM(s) Oral daily  senna 2 Tablet(s) Oral at bedtime  sodium chloride 3%  Inhalation 4 milliLiter(s) Inhalation every 12 hours  traZODone 100 milliGRAM(s) Oral daily    MEDICATIONS  (PRN):  HYDROmorphone   Tablet 2 milliGRAM(s) Oral every 3 hours PRN Moderate Pain (4 - 6)  HYDROmorphone   Tablet 4 milliGRAM(s) Oral every 3 hours PRN Severe Pain (7 - 10)  HYDROmorphone  Injectable 0.5 milliGRAM(s) IV Push every 3 hours PRN Severe Breakthrough Pain (7 - 10)  polyethylene glycol 3350 17 Gram(s) Oral daily PRN Constipation      OBJECTIVE:   [X] No new signs     [ ] Other:    Side Effects:  [X ] None			[ ] Other:      ASSESSMENT/PLAN  -Discontinue current therapy    [ ] Therapy changed to:    [ ] IV PCA       [ ] Epidural     [ X] prn Analgesics     Comments: Pain management per primary team, APS to sign off
NATHANIEL BRAVO                     [id number]    HPI:  73 year old female with hx of CAD x 2 stents, , Bipolar Disease, Osteoarthritis, Patient presents preop dx of pulmonary solitary nodule. Patient is scheduled for right video thoracic surgery , wedge resection of right upper lobe nodule  (24 Aug 2023 11:08)    CHIEF COMPLAINT: Follow up in ICU  for postoperative care of patient who is s/p lung surgery    Procedure:  Flexible Bronchoscopy, Right VATS, Right Upper Lobe Wedge Resection, Mediastinal Lymph Node Dissection.                       Issues:   Hypoxia, 86% on ambulation, need home O2              Lung nodule              Postop pain              Chest tube in place  Bipolar disorder  CAD (coronary artery disease) s/p Myocardial infarct  COPD (chronic obstructive pulmonary disease)  H/O gastroesophageal reflux (GERD)  hypertension  Cancer of skin  History of hip replacement    PAST MEDICAL & SURGICAL HISTORY:  Bipolar disorder  oral medication      CAD (coronary artery disease)      Unilateral primary osteoarthritis, left hip      Myocardial infarct, old  2016      COPD (chronic obstructive pulmonary disease)      H/O gastroesophageal reflux (GERD)      Benign hypertension      Cancer of skin  multiple locations several have been removed and or laser    face (forehead), nose, arms      Weight loss  attempting to loss weight states so far has lost about 10 pounds      Anxiousness  concern re surgery      Gait disturbance      Cloudy urine      Arm fracture, right  2010  s/p fall      Conjunctivitis, bacterial      Kidney lesion      S/P cardiac catheterization  x 4      S/P tonsillectomy  childhood      History of hip replacement  May 2020                VITAL SIGNS:  Vital Signs Last 24 Hrs  T(C): 36.4 (10 Sep 2023 05:00), Max: 36.7 (09 Sep 2023 09:20)  T(F): 97.6 (10 Sep 2023 05:00), Max: 98.1 (09 Sep 2023 20:00)  HR: 86 (10 Sep 2023 05:00) (80 - 89)  BP: 140/67 (10 Sep 2023 05:00) (106/52 - 140/67)  BP(mean): --  RR: 18 (10 Sep 2023 05:00) (16 - 18)  SpO2: 90% (10 Sep 2023 05:00) (90% - 98%)    Parameters below as of 10 Sep 2023 05:00  Patient On (Oxygen Delivery Method): room air        I/Os:   I&O's Detail    09 Sep 2023 07:01  -  10 Sep 2023 07:00  --------------------------------------------------------  IN:    Oral Fluid: 600 mL  Total IN: 600 mL    OUT:    Voided (mL): 450 mL  Total OUT: 450 mL    Total NET: 150 mL          CAPILLARY BLOOD GLUCOSE          =======================MEDICATIONS===================  MEDICATIONS  (STANDING):  acetaminophen     Tablet .. 650 milliGRAM(s) Oral every 6 hours  albuterol    0.083% 2.5 milliGRAM(s) Nebulizer every 6 hours  aspirin enteric coated 81 milliGRAM(s) Oral daily  atorvastatin 80 milliGRAM(s) Oral at bedtime  buPROPion XL (24-Hour) . 150 milliGRAM(s) Oral daily  carvedilol 3.125 milliGRAM(s) Oral every 12 hours  clopidogrel Tablet 75 milliGRAM(s) Oral daily  gabapentin 100 milliGRAM(s) Oral every 8 hours  heparin   Injectable 5000 Unit(s) SubCutaneous every 8 hours  lamoTRIgine 25 milliGRAM(s) Oral daily  lamoTRIgine 125 milliGRAM(s) Oral daily  lidocaine   4% Patch 1 Patch Transdermal every 24 hours  nicotine -  14 mG/24Hr(s) Patch 1 Patch Transdermal daily  OLANZapine 10 milliGRAM(s) Oral daily  senna 2 Tablet(s) Oral at bedtime  sodium chloride 3%  Inhalation 4 milliLiter(s) Inhalation every 12 hours  traZODone 100 milliGRAM(s) Oral daily    MEDICATIONS  (PRN):  HYDROmorphone   Tablet 2 milliGRAM(s) Oral every 3 hours PRN Moderate Pain (4 - 6)  HYDROmorphone   Tablet 4 milliGRAM(s) Oral every 3 hours PRN Severe Pain (7 - 10)  HYDROmorphone  Injectable 0.5 milliGRAM(s) IV Push every 3 hours PRN Severe Breakthrough Pain (7 - 10)  polyethylene glycol 3350 17 Gram(s) Oral daily PRN Constipation    PHYSICAL EXAM============================  General:                         Awake, alert, not in any distress  Neuro:                            Moving all extremities to commands.   Respiratory:	Air entry fair and  bilateral conducted sounds                                           Effort even and unlabored.  CV:		Regular rate and rhythm. Normal S1/S2                                          Distal pulses present.  Abdomen:	                     Soft, non-distended. Bowel sounds present   Skin:		No rash.  Extremities:	Warm, no cyanosis or edema.  Palpable pulses    ============================LABS=========================                        12.1   8.15  )-----------( 191      ( 10 Sep 2023 05:09 )             35.9     09-10    140  |  105  |  11  ----------------------------<  112<H>  4.1   |  26  |  0.57    Ca    9.0      10 Sep 2023 05:09  Phos  2.4     09-09  Mg     2.10     09-09            Urinalysis Basic - ( 10 Sep 2023 05:09 )    Color: x / Appearance: x / SG: x / pH: x  Gluc: 112 mg/dL / Ketone: x  / Bili: x / Urobili: x   Blood: x / Protein: x / Nitrite: x   Leuk Esterase: x / RBC: x / WBC x   Sq Epi: x / Non Sq Epi: x / Bacteria: x    A/P:  73yFemale s/p Flexible Bronchoscopy, Right VATS, Right Upper Lobe Wedge Resection, Mediastinal Lymph Node Dissection , experiencing  pain with deep breathing.                             Neuro:                                         Pain control with PCA /  Tylenol PRN / Lidopatch    Bipolar disorder: Continue Lamictal, Zyprexa, Wellbutrin and trazadone                            Cardiovascular:                                          Telemetry (medical test) - Reviewed by me today independently. Pt is in normal sinus rhythm .                                          HLD: Continue Lipitor     CAD: May start ASA 81mg  in am and Plavix after CT removal                                         HTN: Continue hemodynamic monitoring and restart Cozar                                        Continue hemodynamic monitoring to prevent decompensation.                            Respiratory:                                         Postop hypoxemia requiring O2 via nasal cannula Hypoxia, 86% on ambulation, needs home O2 - Wean nasal cannula for goal O2sat above 92%.                                            . Encourage incentive spirometry.                                                   Chest PT and frequent suctioning.                                          COPD: Continue bronchodilators, inhaled steroids, Pulmozyme and inhaled 3% saline inhalations.                                         OOB to chair & ambulate w/ assistance.                                          Continuous pulse oximetry for support & to prevent decompensation.                                                                                                              GI                                         On DASH  diet as tolerated                                         Continue G.I prophylaxis with Protonix                                          Continue Zofran / Reglan for nausea - PRN                                         Continue bowel regimen	                                                                 Renal:                                         Monitor I/Os and electrolytes                                                                                        Hem/ Onc:                                         DVT prophylaxis with SQ Heparin and SCDs                                         Monitor chest tube output &  signs of bleeding.                                          Follow CBC in AM                           Infectious disease:                                            Monitor for fever / leukocytosis.                                          All surgical incision / chest tube  sites look clean                            Endocrine                                             Continue Accu-Checks with coverage                                                  Pertinent clinical, laboratory, radiographic, hemodynamic, echocardiographic, respiratory data, microbiologic data and chart were reviewed and analyzed frequently throughout the course of the day and night.   Patient seen, examined and plan discussed with CT Surgeon Dr. Hillman  / CTICU team during rounds.  OOB to chair and ambulate with physical therapy as tolerated.   Status discussed with patient and updated plan of care.   I have spent 40 minutes with this patient  including 20 minutes of time monitoring hemodynamic status, respiratory status and  coordinating care in the ICU      New Hernandez DO, FACEP    
NATHANIEL BRAVO                     [id number]    HPI:  73 year old female with hx of CAD x 2 stents, , Bipolar Disease, Osteoarthritis, Patient presents preop dx of pulmonary solitary nodule. Patient is scheduled for right video thoracic surgery , wedge resection of right upper lobe nodule  (24 Aug 2023 11:08)    CHIEF COMPLAINT: Follow up in ICU  for postoperative care of patient who is s/p lung surgery    Procedure:  Flexible Bronchoscopy, Right VATS, Right Upper Lobe Wedge Resection, Mediastinal Lymph Node Dissection.                       Issues:   Hypoxia, 86% on ambulation, need home O2              Lung nodule              Postop pain              Chest tube in place  Bipolar disorder  CAD (coronary artery disease) s/p Myocardial infarct  COPD (chronic obstructive pulmonary disease)  H/O gastroesophageal reflux (GERD)  hypertension  Cancer of skin  History of hip replacement      PAST MEDICAL & SURGICAL HISTORY:  Bipolar disorder  oral medication      CAD (coronary artery disease)      Unilateral primary osteoarthritis, left hip      Myocardial infarct, old  2016      COPD (chronic obstructive pulmonary disease)      H/O gastroesophageal reflux (GERD)      Benign hypertension      Cancer of skin  multiple locations several have been removed and or laser    face (forehead), nose, arms      Weight loss  attempting to loss weight states so far has lost about 10 pounds      Anxiousness  concern re surgery      Gait disturbance      Cloudy urine      Arm fracture, right  2010  s/p fall      Conjunctivitis, bacterial      Kidney lesion      S/P cardiac catheterization  x 4      S/P tonsillectomy  childhood      History of hip replacement  May 2020                VITAL SIGNS:  Vital Signs Last 24 Hrs  T(C): 36.8 (08 Sep 2023 08:00), Max: 37.1 (08 Sep 2023 00:00)  T(F): 98.2 (08 Sep 2023 08:00), Max: 98.7 (08 Sep 2023 00:00)  HR: 85 (08 Sep 2023 10:00) (74 - 94)  BP: 114/70 (08 Sep 2023 10:00) (102/58 - 163/99)  BP(mean): 82 (08 Sep 2023 10:00) (61 - 109)  RR: 24 (08 Sep 2023 10:00) (15 - 31)  SpO2: 87% (08 Sep 2023 10:00) (86% - 100%)    Parameters below as of 08 Sep 2023 09:10  Patient On (Oxygen Delivery Method): nasal cannula w/ humidification  O2 Flow (L/min): 2      I/Os:   I&O's Detail    07 Sep 2023 07:01  -  08 Sep 2023 07:00  --------------------------------------------------------  IN:    IV PiggyBack: 200 mL    Lactated Ringers: 600 mL    Oral Fluid: 580 mL  Total IN: 1380 mL    OUT:    Chest Tube (mL): 100 mL    Voided (mL): 1000 mL  Total OUT: 1100 mL    Total NET: 280 mL      08 Sep 2023 07:01  -  08 Sep 2023 10:55  --------------------------------------------------------  IN:    Lactated Ringers: 30 mL    Oral Fluid: 240 mL  Total IN: 270 mL    OUT:    Chest Tube (mL): 20 mL  Total OUT: 20 mL    Total NET: 250 mL          CAPILLARY BLOOD GLUCOSE          =======================MEDICATIONS===================  MEDICATIONS  (STANDING):  acetaminophen     Tablet .. 650 milliGRAM(s) Oral every 6 hours  albuterol    0.083% 2.5 milliGRAM(s) Nebulizer every 6 hours  atorvastatin 80 milliGRAM(s) Oral at bedtime  buPROPion XL (24-Hour) . 150 milliGRAM(s) Oral daily  heparin   Injectable 5000 Unit(s) SubCutaneous every 8 hours  lamoTRIgine 125 milliGRAM(s) Oral daily  lamoTRIgine 25 milliGRAM(s) Oral daily  lidocaine   4% Patch 1 Patch Transdermal every 24 hours  nicotine -  14 mG/24Hr(s) Patch 1 Patch Transdermal daily  OLANZapine 10 milliGRAM(s) Oral daily  senna 2 Tablet(s) Oral at bedtime  sodium chloride 3%  Inhalation 4 milliLiter(s) Inhalation every 12 hours  traZODone 100 milliGRAM(s) Oral daily    MEDICATIONS  (PRN):  HYDROmorphone   Tablet 2 milliGRAM(s) Oral every 3 hours PRN Moderate Pain (4 - 6)  HYDROmorphone   Tablet 4 milliGRAM(s) Oral every 3 hours PRN Severe Pain (7 - 10)  HYDROmorphone  Injectable 0.5 milliGRAM(s) IV Push every 3 hours PRN Severe Breakthrough Pain (7 - 10)  polyethylene glycol 3350 17 Gram(s) Oral daily PRN Constipation      PHYSICAL EXAM============================  General:                         Awake, alert, not in any distress  Neuro:                            Moving all extremities to commands.   Respiratory:	Air entry fair and  bilateral conducted sounds                                           Effort even and unlabored.  CV:		Regular rate and rhythm. Normal S1/S2                                          Distal pulses present.  Abdomen:	                     Soft, non-distended. Bowel sounds present   Skin:		No rash.  Extremities:	Warm, no cyanosis or edema.  Palpable pulses    ============================LABS=========================                        13.6   15.44 )-----------( 219      ( 08 Sep 2023 02:45 )             40.3     09-08    140  |  105  |  9   ----------------------------<  136<H>  4.1   |  23  |  0.62    Ca    9.1      08 Sep 2023 02:45  Phos  2.9     09-08  Mg     2.20     09-08            Urinalysis Basic - ( 08 Sep 2023 02:45 )    Color: x / Appearance: x / SG: x / pH: x  Gluc: 136 mg/dL / Ketone: x  / Bili: x / Urobili: x   Blood: x / Protein: x / Nitrite: x   Leuk Esterase: x / RBC: x / WBC x   Sq Epi: x / Non Sq Epi: x / Bacteria: x    A/P:  73yFemale s/p Flexible Bronchoscopy, Right VATS, Right Upper Lobe Wedge Resection, Mediastinal Lymph Node Dissection , experiencing  pain with deep breathing.                             Neuro:                                         Pain control with PCA /  Tylenol PRN / Lidopatch    Bipolar disorder: Continue Lamictal, Zyprexa, Wellbutrin and trazadone                            Cardiovascular:                                          Telemetry (medical test) - Reviewed by me today independently. Pt is in normal sinus rhythm .                                          HLD: Continue Lipitor     CAD: May start ASA 81mg  in am and Plavix after CT removal                                         HTN: Continue hemodynamic monitoring and restart Cozar                                        Continue hemodynamic monitoring to prevent decompensation.                            Respiratory:                                         Postop hypoxemia requiring O2 via nasal cannula Hypoxia, 86% on ambulation, need home O2 - Wean nasal cannula for goal O2sat above 92%.                                            . Encourage incentive spirometry.                                                   Chest PT and frequent suctioning.                                          COPD: Continue bronchodilators, inhaled steroids, Pulmozyme and inhaled 3% saline inhalations.                                         OOB to chair & ambulate w/ assistance.                                          Continuous pulse oximetry for support & to prevent decompensation.                                         Monitor chest tube output                                         Chest tube to water seal                                                                                            GI                                         On DASH  diet as tolerated                                         Continue G.I prophylaxis with Protonix                                          Continue Zofran / Reglan for nausea - PRN                                         Continue bowel regimen	                                                                 Renal:                                         Monitor I/Os and electrolytes                                                                                        Hem/ Onc:                                         DVT prophylaxis with SQ Heparin and SCDs                                         Monitor chest tube output &  signs of bleeding.                                          Follow CBC in AM                           Infectious disease:                                            Monitor for fever / leukocytosis.                                          All surgical incision / chest tube  sites look clean                            Endocrine                                             Continue Accu-Checks with coverage                                                  Pertinent clinical, laboratory, radiographic, hemodynamic, echocardiographic, respiratory data, microbiologic data and chart were reviewed and analyzed frequently throughout the course of the day and night.   Patient seen, examined and plan discussed with CT Surgeon Dr. Hillman  / CTICU team during rounds.  OOB to chair and ambulate with physical therapy as tolerated.   Status discussed with patient and updated plan of care.   I have spent 40 minutes with this patient  including 20 minutes of time monitoring hemodynamic status, respiratory status and  coordinating care in the ICU.    New Hernandez DO, FACEP    
NATHANIEL BRAVO      73y   Female   [id number]         haloperidol (Other)  Cogentin (Other)  codeine (Unknown)             Daily Height in cm: 157.48 (07 Sep 2023 06:10)    Daily Drug Dosing Weight  Height (cm): 157.5 (07 Sep 2023 06:10)  Weight (kg): 103.4 (07 Sep 2023 06:10)  BMI (kg/m2): 41.7 (07 Sep 2023 06:10)  BSA (m2): 2.02 (07 Sep 2023 06:10)    HPI:  73 year old female with hx of CAD x 2 stents, , Bipolar Disease, Osteoarthritis, Patient presents preop dx of pulmonary solitary nodule. Patient is scheduled for right video thoracic surgery , wedge resection of right upper lobe nodule  (24 Aug 2023 11:08)      CHIEF COMPLAINT: Follow up in ICU  for postoperative care of patient who is s/p lung surgery    Procedure:  Flexible Bronchoscopy, Right VATS, Right Upper Lobe Wedge Resection, Mediastinal Lymph Node Dissection.                       Issues:              Lung nodule              Postop pain              Chest tube in place  Bipolar disorder  CAD (coronary artery disease) s/p Myocardial infarct  COPD (chronic obstructive pulmonary disease)  H/O gastroesophageal reflux (GERD)  hypertension  Cancer of skin  History of hip replacement          Postop course:     Patient reports moderate pain at chest wall incision sites which is worse with coughing and deep breathing without associated fever or dyspnea. Pain is improved with use of PCA and  oral pain meds.         Home Medications:  aspirin 81 mg oral tablet: 1 tab(s) orally once a day (in the morning) (07 Sep 2023 06:26)  Coreg 3.125 mg oral tablet: 1 tab(s) orally 2 times a day (07 Sep 2023 06:26)  Cozaar 25 mg oral tablet: 1 tab(s) orally once a day @ 5 PM (07 Sep 2023 06:26)  Lamictal: 250 mg orally once a day @ 2 PM (07 Sep 2023 06:26)  naltrexone 50 mg oral tablet: 1 tab(s) orally once a day @ 2 PM (07 Sep 2023 06:26)  Plavix 75 mg oral tablet: 1 tab(s) orally once a day (in the morning) (07 Sep 2023 06:26)  Protonix 40 mg oral delayed release tablet: 1 tab(s) orally once a day (in the morning) (07 Sep 2023 06:26)  rosuvastatin 20 mg oral tablet: 1 tab(s) orally once a day @ 7 PM (07 Sep 2023 06:26)  traZODone 100 mg oral tablet: 1 tab(s) orally once a day @ 5 PM (07 Sep 2023 06:26)  Wellbutrin  mg/24 hours oral tablet, extended release: 1 tab(s) orally once a day @ 2 PM (07 Sep 2023 06:26)  ZyPREXA 10 mg oral tablet: 1 tab(s) orally once a day @ 7 PM (07 Sep 2023 06:26)    PAST MEDICAL & SURGICAL HISTORY:  Bipolar disorder  oral medication      CAD (coronary artery disease)      Unilateral primary osteoarthritis, left hip      Myocardial infarct, old  2016      COPD (chronic obstructive pulmonary disease)      H/O gastroesophageal reflux (GERD)      Benign hypertension      Cancer of skin  multiple locations several have been removed and or laser    face (forehead), nose, arms      Weight loss  attempting to loss weight states so far has lost about 10 pounds      Anxiousness  concern re surgery      Gait disturbance      Cloudy urine      Arm fracture, right  2010  s/p fall      Conjunctivitis, bacterial      Kidney lesion      S/P cardiac catheterization  x 4      S/P tonsillectomy  childhood      History of hip replacement  May 2020        Vital Signs Last 24 Hrs  T(C): 36.4 (07 Sep 2023 12:30), Max: 36.9 (07 Sep 2023 06:10)  T(F): 97.6 (07 Sep 2023 12:30), Max: 98.4 (07 Sep 2023 06:10)  HR: 84 (07 Sep 2023 15:00) (74 - 85)  BP: 133/61 (07 Sep 2023 15:00) (116/68 - 163/99)  BP(mean): 79 (07 Sep 2023 15:00) (61 - 109)  RR: 20 (07 Sep 2023 15:00) (16 - 24)  SpO2: 100% (07 Sep 2023 15:00) (91% - 100%)    Parameters below as of 07 Sep 2023 15:00  Patient On (Oxygen Delivery Method): nasal cannula  O2 Flow (L/min): 2    I&O's Detail    07 Sep 2023 07:01  -  07 Sep 2023 15:20  --------------------------------------------------------  IN:    IV PiggyBack: 100 mL    Lactated Ringers: 120 mL    Oral Fluid: 100 mL  Total IN: 320 mL    OUT:    Chest Tube (mL): 22 mL  Total OUT: 22 mL    Total NET: 298 mL        CAPILLARY BLOOD GLUCOSE        Home Medications:  aspirin 81 mg oral tablet: 1 tab(s) orally once a day (in the morning) (07 Sep 2023 06:26)  Coreg 3.125 mg oral tablet: 1 tab(s) orally 2 times a day (07 Sep 2023 06:26)  Cozaar 25 mg oral tablet: 1 tab(s) orally once a day @ 5 PM (07 Sep 2023 06:26)  Lamictal: 250 mg orally once a day @ 2 PM (07 Sep 2023 06:26)  naltrexone 50 mg oral tablet: 1 tab(s) orally once a day @ 2 PM (07 Sep 2023 06:26)  Plavix 75 mg oral tablet: 1 tab(s) orally once a day (in the morning) (07 Sep 2023 06:26)  Protonix 40 mg oral delayed release tablet: 1 tab(s) orally once a day (in the morning) (07 Sep 2023 06:26)  rosuvastatin 20 mg oral tablet: 1 tab(s) orally once a day @ 7 PM (07 Sep 2023 06:26)  traZODone 100 mg oral tablet: 1 tab(s) orally once a day @ 5 PM (07 Sep 2023 06:26)  Wellbutrin  mg/24 hours oral tablet, extended release: 1 tab(s) orally once a day @ 2 PM (07 Sep 2023 06:26)  ZyPREXA 10 mg oral tablet: 1 tab(s) orally once a day @ 7 PM (07 Sep 2023 06:26)    MEDICATIONS  (STANDING):  albuterol    0.083% 2.5 milliGRAM(s) Nebulizer every 6 hours  atorvastatin 80 milliGRAM(s) Oral at bedtime  buPROPion XL (24-Hour) . 150 milliGRAM(s) Oral daily  heparin   Injectable 5000 Unit(s) SubCutaneous every 8 hours  HYDROmorphone PCA (1 mG/mL) 30 milliLiter(s) PCA Continuous PCA Continuous  lactated ringers. 1000 milliLiter(s) (30 mL/Hr) IV Continuous <Continuous>  lamoTRIgine 25 milliGRAM(s) Oral daily  lamoTRIgine 125 milliGRAM(s) Oral daily  lidocaine   4% Patch 1 Patch Transdermal every 24 hours  nicotine -  14 mG/24Hr(s) Patch 1 Patch Transdermal daily  OLANZapine 10 milliGRAM(s) Oral daily  senna 2 Tablet(s) Oral at bedtime  sodium chloride 3%  Inhalation 4 milliLiter(s) Inhalation every 12 hours  traZODone 100 milliGRAM(s) Oral daily    MEDICATIONS  (PRN):  acetaminophen   IVPB .. 1000 milliGRAM(s) IV Intermittent every 6 hours PRN Mild Pain (1 - 3)  HYDROmorphone PCA (1 mG/mL) Rescue Clinician Bolus 0.5 milliGRAM(s) IV Push every 15 minutes PRN for Pain Scale GREATER THAN 6  naloxone Injectable 0.1 milliGRAM(s) IV Push every 3 minutes PRN For ANY of the following changes in patient status:  A. RR LESS THAN 10 breaths per minute, B. Oxygen saturation LESS THAN 90%, C. Sedation score of 6  ondansetron Injectable 4 milliGRAM(s) IV Push every 6 hours PRN Nausea  polyethylene glycol 3350 17 Gram(s) Oral daily PRN Constipation        Physical exam:                             General:               Pt is awake, alert,  appears to be in pain but not in distress                                                  Neuro:                  Nonfocal                             Psych:                   A&Ox3                          Cardiovascular:   S1 & S2, regular                           Respiratory:         Air entry is fair and equal on both sides, has bilateral conducted sounds                           GI:                          Soft, nondistended and nontender, Bowel sounds active                            Ext:                        No cyanosis or edema     Labs:                                                               CXR:  < from: Xray Chest 1 View- PORTABLE-Urgent (09.07.23 @ 11:39) >  Right-sided chest tube is seen with loss of volume in this lung post   thoracotomy.    Lungs are clear, the heart is not enlarged and there is no effusion or   pneumothorax.  Subcutaneous emphysema in the right chest.      COMPARISON: March 3, 2020    IMPRESSION: Status post right thoracotomy with chest tube.      Plan:  General: 73yFemale s/p Flexible Bronchoscopy, Right VATS, Right Upper Lobe Wedge Resection, Mediastinal Lymph Node Dissection , experiencing  pain with deep breathing.                             Neuro:                                         Pain control with PCA /  Tylenol PRN / Lidopatch    Bipolar disorder: Continue Lamictal, Zyprexa, Wellbutrin and trazadone                            Cardiovascular:                                          Telemetry (medical test) - Reviewed by me today independently. Pt is in normal sinus rhythm .                                          HLD: Continue Lipitor     CAD: May start ASA 81mg  in am and Plavix after CT removal                                         HTN: Continue hemodynamic monitoring and restart Cozar                                        Continue hemodynamic monitoring to prevent decompensation.                            Respiratory:                                         Postop hypoxemia requiring O2 via nasal cannula probably due to postop pain - Wean nasal cannula for goal O2sat above 92%.                                              CXR is clear. Encourage incentive spirometry.                                                   Chest PT and frequent suctioning.                                          COPD: Continue bronchodilators, inhaled steroids, Pulmozyme and inhaled 3% saline inhalations.                                         OOB to chair & ambulate w/ assistance.                                          Continuous pulse oximetry for support & to prevent decompensation.                                         Monitor chest tube output                                         Chest tube to water seal                                                                                            GI                                         On puree diet, advance to DASH  diet as tolerated                                         Continue G.I prophylaxis with Protonix                                          Continue Zofran / Reglan for nausea - PRN                                         Continue bowel regimen	                                                                 Renal:                                         Continue LR  30cc/hr                                         Monitor I/Os and electrolytes                                                                                        Hem/ Onc:                                         DVT prophylaxis with SQ Heparin and SCDs                                         Monitor chest tube output &  signs of bleeding.                                          Follow CBC in AM                           Infectious disease:                                            Monitor for fever / leukocytosis.                                          All surgical incision / chest tube  sites look clean                            Endocrine                                             Continue Accu-Checks with coverage                                                  Pertinent clinical, laboratory, radiographic, hemodynamic, echocardiographic, respiratory data, microbiologic data and chart were reviewed and analyzed frequently throughout the course of the day and night.   Patient seen, examined and plan discussed with CT Surgeon Dr. Hillman  / CTICU team during rounds.  OOB to chair and ambulate with physical therapy as tolerated.   Status discussed with patient and updated plan of care.   I have spent 50 minutes with this patient  including 20 minutes of time monitoring hemodynamic status, respiratory status and  coordinating care in the ICU.          Gal Anderson MD                                                                    
POST ANESTHESIA EVALUATION    73y Female POSTOP DAY 1 S/P     MENTAL STATUS: Patient participation [  ] Awake     [  ] Arousable     [  ] Sedated    AIRWAY PATENCY: [  ] Satisfactory  [  ] Other:     Vital Signs Last 24 Hrs  T(C): 36.8 (08 Sep 2023 08:00), Max: 37.1 (08 Sep 2023 00:00)  T(F): 98.2 (08 Sep 2023 08:00), Max: 98.7 (08 Sep 2023 00:00)  HR: 83 (08 Sep 2023 09:00) (74 - 94)  BP: 112/69 (08 Sep 2023 09:00) (102/58 - 163/99)  BP(mean): 82 (08 Sep 2023 09:00) (61 - 109)  RR: 24 (08 Sep 2023 09:10) (15 - 31)  SpO2: 94% (08 Sep 2023 09:10) (86% - 100%)    Parameters below as of 08 Sep 2023 09:10  Patient On (Oxygen Delivery Method): nasal cannula w/ humidification  O2 Flow (L/min): 2    I&O's Summary    07 Sep 2023 07:01  -  08 Sep 2023 07:00  --------------------------------------------------------  IN: 1380 mL / OUT: 1100 mL / NET: 280 mL    08 Sep 2023 07:01  -  08 Sep 2023 09:46  --------------------------------------------------------  IN: 270 mL / OUT: 20 mL / NET: 250 mL          NAUSEA/ VOMITTING:  [ x ] NONE  [  ] CONTROLLED [  ] OTHER     PAIN: [ x ] CONTROLLED WITH CURRENT REGIMEN  [  ] OTHER    [ x ] NO APPARENT ANESTHESIA COMPLICATIONS      Comments: 
   Subjective: "I still have severe pain where my surgery was, I can't take deep breaths" Pt c/o severe pain despite dilaudid dose. Barely able to do IS to 500. SOB with exertion, desaturating to mid 80s on RA with walking      Vital Signs:  Vital Signs Last 24 Hrs  T(C): 36.5 (09-09-23 @ 12:00), Max: 37.1 (09-08-23 @ 16:00)  T(F): 97.7 (09-09-23 @ 12:00), Max: 98.7 (09-08-23 @ 16:00)  HR: 87 (09-09-23 @ 12:00) (79 - 98)  BP: 123/59 (09-09-23 @ 12:00) (94/61 - 137/67)  RR: 17 (09-09-23 @ 12:00) (14 - 25)  SpO2: 92% (09-09-23 @ 12:00) (90% - 94%) on (O2)    Telemetry/Alarms:  General: WD NAD  Neurology: Awake, nonfocal, PEREZ x 4  Eyes: Scleras clear, PERRLA/ EOMI, Gross vision intact  ENT:Gross hearing intact, grossly patent pharynx, no stridor  Neck: Neck supple, trachea midline, No JVD,   Respiratory: pt w very poor air entry. Not able to take deep breaths. Dec'd BS bilat  CV: RRR, S1S2, no murmurs, rubs or gallops  Abdominal: Soft, NT, ND +BS, voiding, no BM  Extremities: No edema, + peripheral pulses  Skin: No Rashes, Hematoma, Ecchymosis  Lymphatic: No Neck, axilla, groin LAD  Psych: Oriented x 3, normal affect  Incisions: rt vats c/d/i    Relevant labs, radiology and Medications reviewed           CXR w small rt effusion, atelectasis.              13.1   11.03 )-----------( 185      ( 09 Sep 2023 05:20 )             38.7     09-09    139  |  103  |  13  ----------------------------<  125<H>  4.2   |  26  |  0.64    Ca    9.1      09 Sep 2023 05:20  Phos  2.4     09-09  Mg     2.10     09-09        MEDICATIONS  (STANDING):  acetaminophen     Tablet .. 650 milliGRAM(s) Oral every 6 hours  albuterol    0.083% 2.5 milliGRAM(s) Nebulizer every 6 hours  aspirin enteric coated 81 milliGRAM(s) Oral daily  atorvastatin 80 milliGRAM(s) Oral at bedtime  buPROPion XL (24-Hour) . 150 milliGRAM(s) Oral daily  carvedilol 3.125 milliGRAM(s) Oral every 12 hours  clopidogrel Tablet 75 milliGRAM(s) Oral daily  heparin   Injectable 5000 Unit(s) SubCutaneous every 8 hours  lamoTRIgine 25 milliGRAM(s) Oral daily  lamoTRIgine 125 milliGRAM(s) Oral daily  lidocaine   4% Patch 1 Patch Transdermal every 24 hours  nicotine -  14 mG/24Hr(s) Patch 1 Patch Transdermal daily  OLANZapine 10 milliGRAM(s) Oral daily  senna 2 Tablet(s) Oral at bedtime  sodium chloride 3%  Inhalation 4 milliLiter(s) Inhalation every 12 hours  traZODone 100 milliGRAM(s) Oral daily    MEDICATIONS  (PRN):  HYDROmorphone   Tablet 2 milliGRAM(s) Oral every 3 hours PRN Moderate Pain (4 - 6)  HYDROmorphone   Tablet 4 milliGRAM(s) Oral every 3 hours PRN Severe Pain (7 - 10)  HYDROmorphone  Injectable 0.5 milliGRAM(s) IV Push every 3 hours PRN Severe Breakthrough Pain (7 - 10)  polyethylene glycol 3350 17 Gram(s) Oral daily PRN Constipation      I&O's Summary    08 Sep 2023 07:01  -  09 Sep 2023 07:00  --------------------------------------------------------  IN: 850 mL / OUT: 2320 mL / NET: -1470 mL        Assessment  73y Female  w/ PAST MEDICAL & SURGICAL HISTORY:  Bipolar disorder  oral medication      CAD (coronary artery disease)      Unilateral primary osteoarthritis, left hip      Myocardial infarct, old  2016      COPD (chronic obstructive pulmonary disease)      H/O gastroesophageal reflux (GERD)      Benign hypertension      Cancer of skin  multiple locations several have been removed and or laser    face (forehead), nose, arms      Weight loss  attempting to loss weight states so far has lost about 10 pounds      Anxiousness  concern re surgery      Gait disturbance      Cloudy urine      Arm fracture, right  2010  s/p fall      Conjunctivitis, bacterial      Kidney lesion      S/P cardiac catheterization  x 4      S/P tonsillectomy  childhood      History of hip replacement  May 2020      admitted with complaints of Patient is a 73y old  Female who presents with a chief complaint of right lung resection (24 Aug 2023 11:08)  72yo F s/p Rt VATS RUL wedge on 9/7/23. CT removed on 9/8. Pt remaining inhouse for pain control, pulmonary toileting. 9/9-Transferred to [address] [place] [address].     PLAN  Neuro: Pain management. will inc pain meds. cont tylenol ATC, add neurontin. Told pt/RN to give Dilaudid 4mg dose more frequently.   Pulm: Encourage coughing, deep breathing and use of incentive spirometry. Wean off supplemental oxygen as able. Daily CXR. Will f/u repeat RA O2 sats once pain improved. May need home O2.   Cardio: Monitor telemetry/alarms. Plavix restarted.   GI: Tolerating diet. Continue stool softeners.  Renal: monitor urine output, supplement electrolytes as needed  Vasc: Heparin SC/SCDs for DVT prophylaxis  Heme: Stable H/H. .   ID: Off antibiotics. Stable.  Therapy: OOB/ambulate  Disposition: Aim to D/C to home once pain and hypoxia improved.   Discussed with Cardiothoracic Team at AM rounds.  
   Subjective: "I'm worried about how I'm going to get up my stairs and management at home" Pt states she has 2 flights of stairs to enter apartment. States pain improved to VATS site. Taking pain meds PRN. Awaiting home O2 approval. OOB and amb w assist.     Vital Signs:  Vital Signs Last 24 Hrs  T(C): 36.5 (09-11-23 @ 08:21), Max: 36.6 (09-10-23 @ 20:21)  T(F): 97.7 (09-11-23 @ 08:21), Max: 97.9 (09-10-23 @ 20:21)  HR: 73 (09-11-23 @ 08:32) (61 - 94)  BP: 104/56 (09-11-23 @ 08:21) (104/56 - 147/71)  RR: 19 (09-11-23 @ 08:21) (17 - 19)  SpO2: 98% (09-11-23 @ 08:32) (86% - 98%) on (O2)    Telemetry/Alarms: Tele SR  General: WD NAD  Neurology: Awake, nonfocal, PEREZ x 4  Eyes: Scleras clear, PERRLA/ EOMI, Gross vision intact  ENT:Gross hearing intact, grossly patent pharynx, no stridor  Neck: Neck supple, trachea midline, No JVD,   Respiratory: Dec'd BS bilat bases, poor effort  CV: RRR, S1S2, no murmurs, rubs or gallops  Abdominal: Soft, NT, ND +BS, no BM since Surgery. + flatus. Voiding.   Extremities: No edema, + peripheral pulses  Skin: No Rashes, Hematoma, Ecchymosis  Lymphatic: No Neck, axilla, groin LAD  Psych: Oriented x 3, normal affect  Incisions: rt VATS c/d/i.   Tubes:none  Relevant labs, radiology and Medications reviewed  CXR with small Rt pleural effusion.                         12.1   8.15  )-----------( 191      ( 10 Sep 2023 05:09 )             35.9     09-10    140  |  105  |  11  ----------------------------<  112<H>  4.1   |  26  |  0.57    Ca    9.0      10 Sep 2023 05:09        MEDICATIONS  (STANDING):  acetaminophen     Tablet .. 650 milliGRAM(s) Oral every 6 hours  albuterol    0.083% 2.5 milliGRAM(s) Nebulizer every 6 hours  aspirin enteric coated 81 milliGRAM(s) Oral daily  atorvastatin 80 milliGRAM(s) Oral at bedtime  buPROPion XL (24-Hour) . 150 milliGRAM(s) Oral daily  carvedilol 3.125 milliGRAM(s) Oral every 12 hours  clopidogrel Tablet 75 milliGRAM(s) Oral daily  gabapentin 100 milliGRAM(s) Oral every 8 hours  heparin   Injectable 5000 Unit(s) SubCutaneous every 8 hours  lamoTRIgine 25 milliGRAM(s) Oral daily  lamoTRIgine 125 milliGRAM(s) Oral daily  lidocaine   4% Patch 1 Patch Transdermal every 24 hours  nicotine -  14 mG/24Hr(s) Patch 1 Patch Transdermal daily  OLANZapine 10 milliGRAM(s) Oral daily  polyethylene glycol 3350 17 Gram(s) Oral daily  senna 2 Tablet(s) Oral at bedtime  sodium chloride 3%  Inhalation 4 milliLiter(s) Inhalation every 12 hours  traZODone 100 milliGRAM(s) Oral daily    MEDICATIONS  (PRN):  bisacodyl Suppository 10 milliGRAM(s) Rectal daily PRN Constipation  HYDROmorphone   Tablet 2 milliGRAM(s) Oral every 3 hours PRN Moderate Pain (4 - 6)  HYDROmorphone   Tablet 4 milliGRAM(s) Oral every 3 hours PRN Severe Pain (7 - 10)  HYDROmorphone  Injectable 0.5 milliGRAM(s) IV Push every 3 hours PRN Severe Breakthrough Pain (7 - 10)  magnesium hydroxide Suspension 30 milliLiter(s) Oral daily PRN Constipation      I&O's Summary    10 Sep 2023 07:01  -  11 Sep 2023 07:00  --------------------------------------------------------  IN: 750 mL / OUT: 650 mL / NET: 100 mL      Assessment  73y Female  w/ PAST MEDICAL & SURGICAL HISTORY:  Bipolar disorder  oral medication      CAD (coronary artery disease)      Unilateral primary osteoarthritis, left hip      Myocardial infarct, old  2016      COPD (chronic obstructive pulmonary disease)      H/O gastroesophageal reflux (GERD)      Benign hypertension      Cancer of skin  multiple locations several have been removed and or laser    face (forehead), nose, arms      Weight loss  attempting to loss weight states so far has lost about 10 pounds      Anxiousness  concern re surgery      Gait disturbance      Cloudy urine      Arm fracture, right  2010  s/p fall      Conjunctivitis, bacterial      Kidney lesion      S/P cardiac catheterization  x 4      S/P tonsillectomy  childhood      History of hip replacement  May 2020      admitted with complaints of Patient is a 73y old  Female who presents with a chief complaint of right lung resection (24 Aug 2023 11:08)  .  74yo F s/p Rt VATS RUL wedge on 9/7/23. CT removed on 9/8. Pt remaining inhouse for pain control, pulmonary toileting. 9/9-Transferred to [address] [place] [address].   9/10 pt requires home O2, arrangements d/w case management. Pt requesting VNS< home PT.     PLAN  Neuro: Pain management. Improved on current regiment.   Pulm: Encourage coughing, deep breathing and use of incentive spirometry. Awaiting home O2 approval and delivery. Cont pulm toilet.  Daily CXR.   Cardio: Monitor telemetry/alarms  GI: Tolerating diet. Continue stool softeners. Add laxative  Renal: monitor urine output, supplement electrolytes as needed  Vasc: Heparin SC/SCDs for DVT prophylaxis  Heme: Stable H/H. .   ID: Off antibiotics. Stable.  Therapy: OOB/ambulate. PT recc outpt PT and RW. Will have them re-eval for home PT and do stair training consider pt w multiple flights to get into home.   Disposition: Aim to D/C to home once home care/PT and homeO2 arranged.   Discussed with Cardiothoracic Team at AM rounds.  
   Subjective: patient seen and examined with thoracic surgery team and DR Guzman  pain controlled  pt admits shortness of breath with activity  pt tearful at times  daughter and son at bedside  she will require home O2 - case management has Rx  using incentive spirometer  on bowel regimen, +flatus, +BM  ambulatory with rolling walker and 1 assist  PT recommending home PT  d/w attending on morning TEAMS report      Vital Signs:  Vital Signs Last 24 Hrs  T(C): 36.7 (09-10-23 @ 08:00), Max: 36.7 (09-09-23 @ 20:00)  T(F): 98 (09-10-23 @ 08:00), Max: 98.1 (09-09-23 @ 20:00)  HR: 78 (09-10-23 @ 09:52) (78 - 89)  BP: 135/62 (09-10-23 @ 08:00) (106/52 - 140/67)  RR: 18 (09-10-23 @ 08:00) (18 - 18)  SpO2: 92% (09-10-23 @ 13:09) (86% - 98%) on (O2)    PE  Telemetry/Alarms: sr  General: awake and alert NAD  Neurology: A&Ox3, nonfocal, PEREZ x 4  Respiratory: CTA B/L  CV: RRR, S1S2, no murmurs, rubs or gallops  Abdominal: Soft, NT, ND +BS, Last BM  Extremities: No edema, + peripheral pulses  Incisions: c,d,i    Relevant labs, radiology and Medications reviewed                        12.1   8.15  )-----------( 191      ( 10 Sep 2023 05:09 )             35.9     09-10    140  |  105  |  11  ----------------------------<  112<H>  4.1   |  26  |  0.57    Ca    9.0      10 Sep 2023 05:09  Phos  2.4     09-09  Mg     2.10     09-09        MEDICATIONS  (STANDING):  albuterol    0.083% 2.5 milliGRAM(s) Nebulizer every 6 hours  aspirin enteric coated 81 milliGRAM(s) Oral daily  atorvastatin 80 milliGRAM(s) Oral at bedtime  buPROPion XL (24-Hour) . 150 milliGRAM(s) Oral daily  carvedilol 3.125 milliGRAM(s) Oral every 12 hours  clopidogrel Tablet 75 milliGRAM(s) Oral daily  gabapentin 100 milliGRAM(s) Oral every 8 hours  heparin   Injectable 5000 Unit(s) SubCutaneous every 8 hours  lamoTRIgine 25 milliGRAM(s) Oral daily  lamoTRIgine 125 milliGRAM(s) Oral daily  lidocaine   4% Patch 1 Patch Transdermal every 24 hours  nicotine -  14 mG/24Hr(s) Patch 1 Patch Transdermal daily  OLANZapine 10 milliGRAM(s) Oral daily  senna 2 Tablet(s) Oral at bedtime  sodium chloride 3%  Inhalation 4 milliLiter(s) Inhalation every 12 hours  traZODone 100 milliGRAM(s) Oral daily    MEDICATIONS  (PRN):  HYDROmorphone   Tablet 2 milliGRAM(s) Oral every 3 hours PRN Moderate Pain (4 - 6)  HYDROmorphone   Tablet 4 milliGRAM(s) Oral every 3 hours PRN Severe Pain (7 - 10)  HYDROmorphone  Injectable 0.5 milliGRAM(s) IV Push every 3 hours PRN Severe Breakthrough Pain (7 - 10)  polyethylene glycol 3350 17 Gram(s) Oral daily PRN Constipation    Pertinent Physical Exam  I&O's Summary    09 Sep 2023 07:01  -  10 Sep 2023 07:00  --------------------------------------------------------  IN: 600 mL / OUT: 450 mL / NET: 150 mL          Assessment  72yo F s/p Rt VATS RUL wedge on 9/7/23. CT removed on 9/8. Pt remaining inhouse for pain control, pulmonary toileting. 9/9-Transferred to [address] [place] [address].   9/10 pt requires home O2, arrangements d/w case management    PLAN  Neuro: Pain management. will inc pain meds. cont tylenol ATC, add neurontin. Told pt/RN to give Dilaudid 4mg dose more frequently.   Pulm: Encourage coughing, deep breathing and use of incentive spirometry. Wean off supplemental oxygen as able. Daily CXR.   PT will require home O2 - process initiated  Cardio: Monitor telemetry/alarms. Plavix restarted.   GI: Tolerating diet. Continue stool softeners.  Renal: monitor urine output, supplement electrolytes as needed  Vasc: Heparin SC/SCDs for DVT prophylaxis  Heme: Stable H/H. .   ID: Off antibiotics. Stable.  Therapy: OOB/ambulate w RW  PT rec home PT and pt requesting VNS - d/w case management  Disposition: Aim to D/C to home once home O2 arranged  Discussed with Cardiothoracic Team at AM rounds.

## 2023-09-11 NOTE — DISCHARGE NOTE NURSING/CASE MANAGEMENT/SOCIAL WORK - [email or web address]_GEN_ALL_CORE
Nurse will call and visit day after discharge for new oxygen management, Home Physical Therapy and Home Health Aide.

## 2023-09-11 NOTE — PROGRESS NOTE ADULT - PROVIDER SPECIALTY LIST ADULT
CT Surgery
CT Surgery
Thoracic Surgery
Anesthesia
Critical Care
Pain Medicine
Pain Medicine

## 2023-09-11 NOTE — DISCHARGE NOTE NURSING/CASE MANAGEMENT/SOCIAL WORK - NSDCFUADDAPPT_GEN_ALL_CORE_FT
See Dr Hillman in about 7 to 10 days-   call for an appointment   have a chest xray prior to appointment and bring films  (you can arrange to have chest xray done on 2nd floor of [facility] before your follow up appointment on 3rd floor - speak with office staff to arrange)

## 2023-09-11 NOTE — DISCHARGE NOTE NURSING/CASE MANAGEMENT/SOCIAL WORK - PATIENT PORTAL LINK FT
You can access the FollowMyHealth Patient Portal offered by [facility] by registering at the following website: [email or web address]. By joining [employer]’s FollowMyHealth portal, you will also be able to view your health information using other applications (apps) compatible with our system.

## 2023-09-11 NOTE — DISCHARGE NOTE NURSING/CASE MANAGEMENT/SOCIAL WORK - NSSCCARECORD_GEN_ALL_CORE
Home Care Agency/Durable Medical Equipment Agency Home Care Agency/Durable Medical Equipment Agency/[facility]

## 2023-09-11 NOTE — DISCHARGE NOTE NURSING/CASE MANAGEMENT/SOCIAL WORK - NSDCVIVACCINE_GEN_ALL_CORE_FT
Tdap; 25-Jun-2018 18:45; Alberto Fenton (RN); [employer]; [id number]; IntraMuscular; Deltoid Right.; 0.5 milliLiter(s); VIS (VIS Published: 09-May-2013, VIS Presented: 25-Jun-2018);

## 2023-09-11 NOTE — DISCHARGE NOTE NURSING/CASE MANAGEMENT/SOCIAL WORK - NSDCCRNAME_GEN_ALL_CORE_FT
[facility]   Trip # [id number] at 6pm

## 2023-09-11 NOTE — DISCHARGE NOTE NURSING/CASE MANAGEMENT/SOCIAL WORK - NSDCDMETYPESERV_GEN_ALL_CORE_FT
Rolling walker delivered to bedside. Portable Oxygen and Concentrator to be delivered to patient home.

## 2023-09-12 VITALS — WEIGHT: 109.79 LBS

## 2023-09-12 PROCEDURE — 71045 X-RAY EXAM CHEST 1 VIEW: CPT | Mod: 26

## 2023-09-12 RX ADMIN — Medication 1 PATCH: at 10:20

## 2023-09-12 RX ADMIN — LIDOCAINE 1 PATCH: 4 CREAM TOPICAL at 11:55

## 2023-09-12 RX ADMIN — Medication 100 MILLIGRAM(S): at 11:53

## 2023-09-12 RX ADMIN — CARVEDILOL PHOSPHATE 3.12 MILLIGRAM(S): 80 CAPSULE, EXTENDED RELEASE ORAL at 17:14

## 2023-09-12 RX ADMIN — LIDOCAINE 1 PATCH: 4 CREAM TOPICAL at 00:00

## 2023-09-12 RX ADMIN — Medication 81 MILLIGRAM(S): at 11:52

## 2023-09-12 RX ADMIN — HYDROMORPHONE HYDROCHLORIDE 4 MILLIGRAM(S): 2 INJECTION INTRAMUSCULAR; INTRAVENOUS; SUBCUTANEOUS at 00:10

## 2023-09-12 RX ADMIN — Medication 1 PATCH: at 11:53

## 2023-09-12 RX ADMIN — OLANZAPINE 10 MILLIGRAM(S): 15 TABLET, FILM COATED ORAL at 17:14

## 2023-09-12 RX ADMIN — LAMOTRIGINE 125 MILLIGRAM(S): 25 TABLET, ORALLY DISINTEGRATING ORAL at 11:52

## 2023-09-12 RX ADMIN — Medication 650 MILLIGRAM(S): at 17:13

## 2023-09-12 RX ADMIN — Medication 1 PATCH: at 07:20

## 2023-09-12 RX ADMIN — LAMOTRIGINE 25 MILLIGRAM(S): 25 TABLET, ORALLY DISINTEGRATING ORAL at 08:24

## 2023-09-12 RX ADMIN — GABAPENTIN 100 MILLIGRAM(S): 400 CAPSULE ORAL at 13:17

## 2023-09-12 RX ADMIN — Medication 650 MILLIGRAM(S): at 12:50

## 2023-09-12 RX ADMIN — ALBUTEROL 2.5 MILLIGRAM(S): 90 AEROSOL, METERED ORAL at 09:16

## 2023-09-12 RX ADMIN — CARVEDILOL PHOSPHATE 3.12 MILLIGRAM(S): 80 CAPSULE, EXTENDED RELEASE ORAL at 05:41

## 2023-09-12 RX ADMIN — CLOPIDOGREL BISULFATE 75 MILLIGRAM(S): 75 TABLET, FILM COATED ORAL at 11:52

## 2023-09-12 RX ADMIN — POLYETHYLENE GLYCOL 3350 17 GRAM(S): 17 POWDER, FOR SOLUTION ORAL at 11:57

## 2023-09-12 RX ADMIN — Medication 650 MILLIGRAM(S): at 05:41

## 2023-09-12 RX ADMIN — GABAPENTIN 100 MILLIGRAM(S): 400 CAPSULE ORAL at 05:41

## 2023-09-12 RX ADMIN — BUPROPION HYDROCHLORIDE 150 MILLIGRAM(S): 150 TABLET, EXTENDED RELEASE ORAL at 13:16

## 2023-09-12 RX ADMIN — Medication 650 MILLIGRAM(S): at 11:51

## 2023-09-12 RX ADMIN — SODIUM CHLORIDE 4 MILLILITER(S): 9 INJECTION INTRAMUSCULAR; INTRAVENOUS; SUBCUTANEOUS at 09:30

## 2023-09-12 NOTE — DIETITIAN INITIAL EVALUATION ADULT - NSICDXPASTMEDICALHX_GEN_ALL_CORE_FT
PAST MEDICAL HISTORY:  Anxiousness concern re surgery    Arm fracture, right 2010  s/p fall    Benign hypertension     Bipolar disorder oral medication    CAD (coronary artery disease)     Cancer of skin multiple locations several have been removed and or laser    face (forehead), nose, arms    Cloudy urine     Conjunctivitis, bacterial     COPD (chronic obstructive pulmonary disease)     Gait disturbance     H/O gastroesophageal reflux (GERD)     Kidney lesion     Myocardial infarct, old 2016    Unilateral primary osteoarthritis, left hip     Weight loss attempting to loss weight states so far has lost about 10 pounds

## 2023-09-12 NOTE — DIETITIAN INITIAL EVALUATION ADULT - PERSON TAUGHT/METHOD
Verbally educated patient on Diabetic therapeutic diet. Handouts provided to patient on Consistent Carbohydrate Diet and Carbohydrate Counting, Reading Carbohydrate Labels, and Carbohydrate MyPlate./verbal instruction/written material/patient instructed

## 2023-09-12 NOTE — DIETITIAN INITIAL EVALUATION ADULT - PERTINENT MEDS FT
MEDICATIONS  (STANDING):  acetaminophen     Tablet .. 650 milliGRAM(s) Oral every 6 hours  albuterol    0.083% 2.5 milliGRAM(s) Nebulizer every 6 hours  aspirin enteric coated 81 milliGRAM(s) Oral daily  atorvastatin 80 milliGRAM(s) Oral at bedtime  buPROPion XL (24-Hour) . 150 milliGRAM(s) Oral daily  carvedilol 3.125 milliGRAM(s) Oral every 12 hours  clopidogrel Tablet 75 milliGRAM(s) Oral daily  gabapentin 100 milliGRAM(s) Oral every 8 hours  heparin   Injectable 5000 Unit(s) SubCutaneous every 8 hours  lamoTRIgine 125 milliGRAM(s) Oral daily  lamoTRIgine 25 milliGRAM(s) Oral daily  lidocaine   4% Patch 1 Patch Transdermal every 24 hours  nicotine -  14 mG/24Hr(s) Patch 1 Patch Transdermal daily  OLANZapine 10 milliGRAM(s) Oral daily  polyethylene glycol 3350 17 Gram(s) Oral daily  senna 2 Tablet(s) Oral at bedtime  sodium chloride 3%  Inhalation 4 milliLiter(s) Inhalation every 12 hours  traZODone 100 milliGRAM(s) Oral daily    MEDICATIONS  (PRN):  bisacodyl Suppository 10 milliGRAM(s) Rectal daily PRN Constipation  HYDROmorphone   Tablet 2 milliGRAM(s) Oral every 3 hours PRN Moderate Pain (4 - 6)  HYDROmorphone   Tablet 4 milliGRAM(s) Oral every 3 hours PRN Severe Pain (7 - 10)  HYDROmorphone  Injectable 0.5 milliGRAM(s) IV Push every 3 hours PRN Severe Breakthrough Pain (7 - 10)  magnesium hydroxide Suspension 30 milliLiter(s) Oral daily PRN Constipation

## 2023-09-12 NOTE — DIETITIAN INITIAL EVALUATION ADULT - OTHER INFO
Patient was seen at bedside. A&Ox4. No known food allergies. Patient is consuming 75 - 100% of meals. Patient eats half the tray takes a break then finishes the rest. No chewing or swallowing issues. Denies nausea or vomiting. Patient was complaining of constipation, on bowel regimen. Last bowel movement was today. Patient is weight stable.

## 2023-09-12 NOTE — DIETITIAN INITIAL EVALUATION ADULT - REASON FOR ADMISSION
Solitary pulmonary nodule    71 yo  female with PMH significant for MI in 2016 subsequent stent placement ([facility]), anxiety/ depression, bipolar disorder, alcohol abuse (sober 30 years), former smoker presents today for cardiac angiogram.

## 2023-09-13 ENCOUNTER — NON-APPOINTMENT (OUTPATIENT)
Age: 73
End: 2023-09-13

## 2023-09-13 LAB — SURGICAL PATHOLOGY STUDY: SIGNIFICANT CHANGE UP

## 2023-09-20 ENCOUNTER — APPOINTMENT (OUTPATIENT)
Dept: THORACIC SURGERY | Facility: CLINIC | Age: 73
End: 2023-09-20
Payer: MEDICARE

## 2023-09-20 ENCOUNTER — APPOINTMENT (OUTPATIENT)
Dept: FAMILY MEDICINE | Facility: CLINIC | Age: 73
End: 2023-09-20

## 2023-09-20 ENCOUNTER — APPOINTMENT (OUTPATIENT)
Dept: PULMONOLOGY | Facility: CLINIC | Age: 73
End: 2023-09-20
Payer: MEDICARE

## 2023-09-20 VITALS
BODY MASS INDEX: 42.88 KG/M2 | OXYGEN SATURATION: 94 % | HEART RATE: 82 BPM | SYSTOLIC BLOOD PRESSURE: 122 MMHG | WEIGHT: 233 LBS | DIASTOLIC BLOOD PRESSURE: 83 MMHG | HEIGHT: 62 IN

## 2023-09-20 PROCEDURE — 71046 X-RAY EXAM CHEST 2 VIEWS: CPT

## 2023-09-20 PROCEDURE — 99024 POSTOP FOLLOW-UP VISIT: CPT

## 2023-10-18 ENCOUNTER — OUTPATIENT (OUTPATIENT)
Dept: OUTPATIENT SERVICES | Facility: HOSPITAL | Age: 73
LOS: 1 days | Discharge: HOME | End: 2023-10-18

## 2023-10-18 DIAGNOSIS — C34.91 MALIGNANT NEOPLASM OF UNSPECIFIED PART OF RIGHT BRONCHUS OR LUNG: ICD-10-CM

## 2023-10-18 DIAGNOSIS — Z98.890 OTHER SPECIFIED POSTPROCEDURAL STATES: Chronic | ICD-10-CM

## 2023-10-18 DIAGNOSIS — Z90.89 ACQUIRED ABSENCE OF OTHER ORGANS: Chronic | ICD-10-CM

## 2023-10-18 DIAGNOSIS — Z96.649 PRESENCE OF UNSPECIFIED ARTIFICIAL HIP JOINT: Chronic | ICD-10-CM

## 2023-10-19 ENCOUNTER — APPOINTMENT (OUTPATIENT)
Dept: HEMATOLOGY ONCOLOGY | Facility: CLINIC | Age: 73
End: 2023-10-19
Payer: MEDICARE

## 2023-10-19 ENCOUNTER — NON-APPOINTMENT (OUTPATIENT)
Age: 73
End: 2023-10-19

## 2023-10-19 VITALS
HEIGHT: 62.13 IN | TEMPERATURE: 97.2 F | HEART RATE: 100 BPM | OXYGEN SATURATION: 94 % | DIASTOLIC BLOOD PRESSURE: 69 MMHG | RESPIRATION RATE: 16 BRPM | WEIGHT: 231.49 LBS | BODY MASS INDEX: 42.06 KG/M2 | SYSTOLIC BLOOD PRESSURE: 105 MMHG

## 2023-10-19 DIAGNOSIS — Z63.5 DISRUPTION OF FAMILY BY SEPARATION AND DIVORCE: ICD-10-CM

## 2023-10-19 DIAGNOSIS — Z92.89 PERSONAL HISTORY OF OTHER MEDICAL TREATMENT: ICD-10-CM

## 2023-10-19 PROCEDURE — 99205 OFFICE O/P NEW HI 60 MIN: CPT

## 2023-10-19 SDOH — SOCIAL STABILITY - SOCIAL INSECURITY: DISRUPTION OF FAMILY BY SEPARATION AND DIVORCE: Z63.5

## 2023-10-21 LAB
ALBUMIN SERPL ELPH-MCNC: 4.7 G/DL
ALP BLD-CCNC: 115 U/L
ALT SERPL-CCNC: 18 U/L
ANION GAP SERPL CALC-SCNC: 12 MMOL/L
AST SERPL-CCNC: 15 U/L
BASOPHILS # BLD AUTO: 0.09 K/UL
BASOPHILS NFR BLD AUTO: 1 %
BILIRUB SERPL-MCNC: 0.3 MG/DL
BUN SERPL-MCNC: 9 MG/DL
CALCIUM SERPL-MCNC: 9.8 MG/DL
CHLORIDE SERPL-SCNC: 106 MMOL/L
CO2 SERPL-SCNC: 25 MMOL/L
CREAT SERPL-MCNC: 0.72 MG/DL
EGFR: 89 ML/MIN/1.73M2
EOSINOPHIL # BLD AUTO: 0.38 K/UL
EOSINOPHIL NFR BLD AUTO: 4.3 %
GLUCOSE SERPL-MCNC: 104 MG/DL
HCT VFR BLD CALC: 43.1 %
HGB BLD-MCNC: 15.2 G/DL
IMM GRANULOCYTES NFR BLD AUTO: 0.2 %
LYMPHOCYTES # BLD AUTO: 3.23 K/UL
LYMPHOCYTES NFR BLD AUTO: 36.9 %
MAN DIFF?: NORMAL
MCHC RBC-ENTMCNC: 29.9 PG
MCHC RBC-ENTMCNC: 35.3 GM/DL
MCV RBC AUTO: 84.8 FL
MONOCYTES # BLD AUTO: 0.4 K/UL
MONOCYTES NFR BLD AUTO: 4.6 %
NEUTROPHILS # BLD AUTO: 4.64 K/UL
NEUTROPHILS NFR BLD AUTO: 53 %
PLATELET # BLD AUTO: 252 K/UL
POTASSIUM SERPL-SCNC: 4.6 MMOL/L
PROT SERPL-MCNC: 6.9 G/DL
RBC # BLD: 5.08 M/UL
RBC # FLD: 12.6 %
SODIUM SERPL-SCNC: 143 MMOL/L
WBC # FLD AUTO: 8.76 K/UL

## 2023-10-23 ENCOUNTER — APPOINTMENT (OUTPATIENT)
Dept: CT IMAGING | Facility: HOSPITAL | Age: 73
End: 2023-10-23

## 2023-11-01 ENCOUNTER — RX RENEWAL (OUTPATIENT)
Age: 73
End: 2023-11-01

## 2023-11-07 ENCOUNTER — APPOINTMENT (OUTPATIENT)
Dept: NUCLEAR MEDICINE | Facility: CLINIC | Age: 73
End: 2023-11-07
Payer: MEDICARE

## 2023-11-07 ENCOUNTER — OUTPATIENT (OUTPATIENT)
Dept: OUTPATIENT SERVICES | Facility: HOSPITAL | Age: 73
LOS: 1 days | End: 2023-11-07
Payer: MEDICARE

## 2023-11-07 DIAGNOSIS — C34.91 MALIGNANT NEOPLASM OF UNSPECIFIED PART OF RIGHT BRONCHUS OR LUNG: ICD-10-CM

## 2023-11-07 DIAGNOSIS — Z98.890 OTHER SPECIFIED POSTPROCEDURAL STATES: Chronic | ICD-10-CM

## 2023-11-07 DIAGNOSIS — Z96.649 PRESENCE OF UNSPECIFIED ARTIFICIAL HIP JOINT: Chronic | ICD-10-CM

## 2023-11-07 DIAGNOSIS — Z90.89 ACQUIRED ABSENCE OF OTHER ORGANS: Chronic | ICD-10-CM

## 2023-11-07 PROCEDURE — 78815 PET IMAGE W/CT SKULL-THIGH: CPT

## 2023-11-07 PROCEDURE — 78815 PET IMAGE W/CT SKULL-THIGH: CPT | Mod: 26,PI,MH

## 2023-11-07 PROCEDURE — A9552: CPT

## 2023-11-10 ENCOUNTER — RX RENEWAL (OUTPATIENT)
Age: 73
End: 2023-11-10

## 2023-11-14 NOTE — DIETITIAN INITIAL EVALUATION ADULT - WEIGHT (LBS)
110 Suturegard Intro: Intraoperative tissue expansion was performed, utilizing the SUTUREGARD device, in order to reduce wound tension.

## 2023-12-14 ENCOUNTER — OUTPATIENT (OUTPATIENT)
Dept: OUTPATIENT SERVICES | Facility: HOSPITAL | Age: 73
LOS: 1 days | End: 2023-12-14
Payer: MEDICARE

## 2023-12-14 ENCOUNTER — APPOINTMENT (OUTPATIENT)
Dept: CT IMAGING | Facility: HOSPITAL | Age: 73
End: 2023-12-14
Payer: MEDICARE

## 2023-12-14 DIAGNOSIS — Z96.649 PRESENCE OF UNSPECIFIED ARTIFICIAL HIP JOINT: Chronic | ICD-10-CM

## 2023-12-14 DIAGNOSIS — Z90.89 ACQUIRED ABSENCE OF OTHER ORGANS: Chronic | ICD-10-CM

## 2023-12-14 DIAGNOSIS — Z98.890 OTHER SPECIFIED POSTPROCEDURAL STATES: Chronic | ICD-10-CM

## 2023-12-14 DIAGNOSIS — C34.91 MALIGNANT NEOPLASM OF UNSPECIFIED PART OF RIGHT BRONCHUS OR LUNG: ICD-10-CM

## 2023-12-14 PROCEDURE — 71250 CT THORAX DX C-: CPT

## 2023-12-14 PROCEDURE — 71250 CT THORAX DX C-: CPT | Mod: 26,MH

## 2023-12-29 ENCOUNTER — APPOINTMENT (OUTPATIENT)
Dept: FAMILY MEDICINE | Facility: CLINIC | Age: 73
End: 2023-12-29
Payer: MEDICARE

## 2023-12-29 VITALS — SYSTOLIC BLOOD PRESSURE: 101 MMHG | DIASTOLIC BLOOD PRESSURE: 67 MMHG

## 2023-12-29 VITALS
OXYGEN SATURATION: 94 % | WEIGHT: 237 LBS | BODY MASS INDEX: 43.17 KG/M2 | TEMPERATURE: 97.5 F | HEART RATE: 84 BPM | RESPIRATION RATE: 16 BRPM

## 2023-12-29 DIAGNOSIS — M25.551 PAIN IN RIGHT HIP: ICD-10-CM

## 2023-12-29 PROCEDURE — 99214 OFFICE O/P EST MOD 30 MIN: CPT

## 2023-12-29 NOTE — HISTORY OF PRESENT ILLNESS
[FreeTextEntry8] : Started coughing with sore throat Sunday after grandson was sick, saw them in [place] during [place]. No fever, no n/v/d. Also c/o right hip pain still.   ROS: negative except as noted above

## 2023-12-29 NOTE — ASSESSMENT
[FreeTextEntry1] : supportive care Return to office if you feel worse or do not feel better RTO 3 months for f/u

## 2023-12-29 NOTE — PHYSICAL EXAM
[No Acute Distress] : no acute distress [Well Nourished] : well nourished [Well Developed] : well developed [Well-Appearing] : well-appearing [Normal Voice/Communication] : normal voice/communication [Normal Sclera/Conjunctiva] : normal sclera/conjunctiva [Normal Outer Ear/Nose] : the outer ears and nose were normal in appearance [Supple] : supple [No Respiratory Distress] : no respiratory distress  [No Accessory Muscle Use] : no accessory muscle use [Clear to Auscultation] : lungs were clear to auscultation bilaterally [Normal Rate] : normal rate  [Regular Rhythm] : with a regular rhythm [Normal S1, S2] : normal S1 and S2 [Speech Grossly Normal] : speech grossly normal [Memory Grossly Normal] : memory grossly normal [Normal Affect] : the affect was normal [Alert and Oriented x3] : oriented to person, place, and time [Normal Mood] : the mood was normal [Normal Insight/Judgement] : insight and judgment were intact [No Lymphadenopathy] : no lymphadenopathy [de-identified] : sl erythema o/p, no exudates

## 2024-01-03 ENCOUNTER — APPOINTMENT (OUTPATIENT)
Dept: THORACIC SURGERY | Facility: CLINIC | Age: 74
End: 2024-01-03
Payer: MEDICARE

## 2024-01-03 VITALS
SYSTOLIC BLOOD PRESSURE: 110 MMHG | BODY MASS INDEX: 43.61 KG/M2 | WEIGHT: 237 LBS | HEART RATE: 87 BPM | DIASTOLIC BLOOD PRESSURE: 75 MMHG | OXYGEN SATURATION: 93 % | HEIGHT: 62 IN

## 2024-01-03 PROCEDURE — 99214 OFFICE O/P EST MOD 30 MIN: CPT

## 2024-01-03 NOTE — CONSULT LETTER
[Dear  ___] : Dear  [unfilled], [Consult Letter:] : I had the pleasure of evaluating your patient, [unfilled]. [( Thank you for referring [unfilled] for consultation for _____ )] : Thank you for referring [unfilled] for consultation for [unfilled] [Please see my note below.] : Please see my note below. [Consult Closing:] : Thank you very much for allowing me to participate in the care of this patient.  If you have any questions, please do not hesitate to contact me. [Sincerely,] : Sincerely, [FreeTextEntry2] : Dr. Rickie Costa (PCP/Ref) [FreeTextEntry3] : Janes Hillman MD  Attending Surgeon  Division of Thoracic Surgery  , Cardiovascular and Thoracic Surgery  [facility]

## 2024-01-03 NOTE — ASSESSMENT
[FreeTextEntry1] : Ms. NATHANIEL BRAVO, 73-year-old female who is s/p FB, Right VATS, RUL wedge resection and MLND on 09/07/2023for a 1cm squamous cell carcinoma pT1aN0. Most recent CT of the chest dated12/14/2023 shows no evidence of new or recurrent disease. I have asked the patient to obtain a repeat CT of the chest in 4 months and to follow up with me at that time. I have answered all of the patient's questions and she understands the importance of follow up.   I, LACI Guallpa, personally performed the evaluation and management (E/M) services for this established patient who presents today with (a) new problem(s)/exacerbation of (an) existing condition(s). That E/M includes conducting the examination, assessing all new/exacerbated conditions, and establishing a new plan of care. Today, my ACP, rEin Maurer NP was here to observe my evaluation and management services for this new problem/exacerbated condition to be followed going forward.

## 2024-01-03 NOTE — HISTORY OF PRESENT ILLNESS
[FreeTextEntry1] : Ms. NATHANIEL BRAVO, 73 year old female, former smoker (1 ppd since age 21, quit 2019), current user of vaping products, hx of ETOH abuse, w/ hx of CAD s/p 2 stents (2019, then 08/2020 on Plavix), MI (08/2020), HLD, HTN, BCC of the forehead, Bipolar Disorder, who referred by Dr. Rickie Costa (PCP) for increasing size lung nodule.  Patient is s/p FB, Right VATS, RUL wedge resection and MLND on 09/07/2023. 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 No visceral pleural invasion is present. The surgical margin is negative.  CT chest on 12/14/2023:  - Mild emphysema.  - Unchanged mild soft tissue thickening along right lung wedge resection staple line and mild subpleural scarring in the middle and right lower lobes.  - Unchanged few solid nodules up to 5 mm in both lower lobes.  Patient is here today for a follow up. Denies SOB, CP, cough.

## 2024-01-09 ENCOUNTER — OUTPATIENT (OUTPATIENT)
Dept: OUTPATIENT SERVICES | Facility: HOSPITAL | Age: 74
LOS: 1 days | Discharge: HOME | End: 2024-01-09

## 2024-01-09 DIAGNOSIS — Z90.89 ACQUIRED ABSENCE OF OTHER ORGANS: Chronic | ICD-10-CM

## 2024-01-09 DIAGNOSIS — C34.91 MALIGNANT NEOPLASM OF UNSPECIFIED PART OF RIGHT BRONCHUS OR LUNG: ICD-10-CM

## 2024-01-09 DIAGNOSIS — Z96.649 PRESENCE OF UNSPECIFIED ARTIFICIAL HIP JOINT: Chronic | ICD-10-CM

## 2024-01-09 DIAGNOSIS — Z98.890 OTHER SPECIFIED POSTPROCEDURAL STATES: Chronic | ICD-10-CM

## 2024-01-10 ENCOUNTER — APPOINTMENT (OUTPATIENT)
Dept: SURGERY | Facility: CLINIC | Age: 74
End: 2024-01-10
Payer: MEDICARE

## 2024-01-10 VITALS — WEIGHT: 229 LBS | HEIGHT: 62 IN | BODY MASS INDEX: 42.14 KG/M2

## 2024-01-10 DIAGNOSIS — J44.9 CHRONIC OBSTRUCTIVE PULMONARY DISEASE, UNSPECIFIED: ICD-10-CM

## 2024-01-10 DIAGNOSIS — Z80.1 FAMILY HISTORY OF MALIGNANT NEOPLASM OF TRACHEA, BRONCHUS AND LUNG: ICD-10-CM

## 2024-01-10 DIAGNOSIS — C44.91 BASAL CELL CARCINOMA OF SKIN, UNSPECIFIED: ICD-10-CM

## 2024-01-10 DIAGNOSIS — Z80.8 FAMILY HISTORY OF MALIGNANT NEOPLASM OF OTHER ORGANS OR SYSTEMS: ICD-10-CM

## 2024-01-10 DIAGNOSIS — I25.2 OLD MYOCARDIAL INFARCTION: ICD-10-CM

## 2024-01-10 DIAGNOSIS — Z85.828 PERSONAL HISTORY OF OTHER MALIGNANT NEOPLASM OF SKIN: ICD-10-CM

## 2024-01-10 PROCEDURE — 99202 OFFICE O/P NEW SF 15 MIN: CPT

## 2024-01-10 RX ORDER — PANTOPRAZOLE 40 MG/1
40 TABLET, DELAYED RELEASE ORAL
Qty: 30 | Refills: 3 | Status: COMPLETED | COMMUNITY
Start: 2018-01-24 | End: 2024-01-10

## 2024-01-10 RX ORDER — BENZONATATE 100 MG/1
100 CAPSULE ORAL TWICE DAILY
Qty: 14 | Refills: 0 | Status: COMPLETED | COMMUNITY
Start: 2022-12-29 | End: 2024-01-10

## 2024-01-10 RX ORDER — PHENYLEPHRINE HCL 10 MG
7 TABLET ORAL DAILY
Qty: 14 | Refills: 4 | Status: COMPLETED | COMMUNITY
Start: 2023-09-24 | End: 2024-01-10

## 2024-01-10 RX ORDER — NICOTINE 21 MG/24HR
21 PATCH, TRANSDERMAL 24 HOURS TRANSDERMAL
Qty: 28 | Refills: 0 | Status: COMPLETED | COMMUNITY
Start: 2023-09-24 | End: 2024-01-10

## 2024-01-10 RX ORDER — CLOPIDOGREL BISULFATE 75 MG/1
75 TABLET, FILM COATED ORAL
Refills: 0 | Status: COMPLETED | COMMUNITY
Start: 2022-01-18 | End: 2024-01-10

## 2024-01-10 RX ORDER — NICOTINE 21 MG/24HR
14 PATCH, TRANSDERMAL 24 HOURS TRANSDERMAL DAILY
Qty: 14 | Refills: 0 | Status: COMPLETED | COMMUNITY
Start: 2023-09-24 | End: 2024-01-10

## 2024-01-10 RX ORDER — CHOLECALCIFEROL (VITAMIN D3) 1250 MCG
1.25 MG CAPSULE ORAL
Qty: 8 | Refills: 2 | Status: COMPLETED | COMMUNITY
Start: 2023-06-19 | End: 2024-01-10

## 2024-01-10 RX ORDER — CLOPIDOGREL BISULFATE 75 MG/1
75 TABLET, FILM COATED ORAL
Qty: 90 | Refills: 1 | Status: COMPLETED | COMMUNITY
Start: 2021-01-27 | End: 2024-01-10

## 2024-01-11 PROBLEM — Z85.828 HISTORY OF BASAL CELL CARCINOMA (BCC) OF SKIN OF OTHER PART OF FACE: Status: RESOLVED | Noted: 2023-08-16 | Resolved: 2024-01-11

## 2024-01-11 NOTE — HISTORY OF PRESENT ILLNESS
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 The patient has a history of basal call CA.

## 2024-01-11 NOTE — ASSESSMENT
[FreeTextEntry1] : Discussion regarding all options and risks To undergo wide excision of melanoma of skin of back om2/5/24 All lab values and imaging studies reviewed Discussed with medicine

## 2024-01-11 NOTE — REVIEW OF SYSTEMS
[Heart Rate Is Slow] : the heart rate was not slow [Chest Pain] : no chest pain [Shortness Of Breath] : shortness of breath [Negative] : Gastrointestinal

## 2024-01-11 NOTE — PHYSICAL EXAM
[Respiratory Effort] : normal respiratory effort [Normal Heart Sounds] : normal heart sounds [Abdominal Masses] : No abdominal masses [Abdomen Tenderness] : ~T ~M No abdominal tenderness [de-identified] : nl [de-identified] : nl [de-identified] : nl [de-identified] : left upper back - iopsy site of melanoma

## 2024-01-16 ENCOUNTER — APPOINTMENT (OUTPATIENT)
Dept: HEMATOLOGY ONCOLOGY | Facility: CLINIC | Age: 74
End: 2024-01-16

## 2024-01-19 ENCOUNTER — NON-APPOINTMENT (OUTPATIENT)
Age: 74
End: 2024-01-19

## 2024-01-21 ENCOUNTER — RX RENEWAL (OUTPATIENT)
Age: 74
End: 2024-01-21

## 2024-01-23 ENCOUNTER — OUTPATIENT (OUTPATIENT)
Dept: OUTPATIENT SERVICES | Facility: HOSPITAL | Age: 74
LOS: 1 days | End: 2024-01-23
Payer: MEDICARE

## 2024-01-23 VITALS
HEIGHT: 63 IN | DIASTOLIC BLOOD PRESSURE: 70 MMHG | WEIGHT: 231.49 LBS | HEART RATE: 90 BPM | OXYGEN SATURATION: 95 % | SYSTOLIC BLOOD PRESSURE: 106 MMHG | RESPIRATION RATE: 18 BRPM | TEMPERATURE: 98 F

## 2024-01-23 DIAGNOSIS — Z96.649 PRESENCE OF UNSPECIFIED ARTIFICIAL HIP JOINT: Chronic | ICD-10-CM

## 2024-01-23 DIAGNOSIS — C43.59 MALIGNANT MELANOMA OF OTHER PART OF TRUNK: ICD-10-CM

## 2024-01-23 DIAGNOSIS — Z98.890 OTHER SPECIFIED POSTPROCEDURAL STATES: Chronic | ICD-10-CM

## 2024-01-23 DIAGNOSIS — Z01.818 ENCOUNTER FOR OTHER PREPROCEDURAL EXAMINATION: ICD-10-CM

## 2024-01-23 DIAGNOSIS — Z90.89 ACQUIRED ABSENCE OF OTHER ORGANS: Chronic | ICD-10-CM

## 2024-01-23 DIAGNOSIS — I25.10 ATHEROSCLEROTIC HEART DISEASE OF NATIVE CORONARY ARTERY WITHOUT ANGINA PECTORIS: ICD-10-CM

## 2024-01-23 DIAGNOSIS — R91.1 SOLITARY PULMONARY NODULE: Chronic | ICD-10-CM

## 2024-01-23 DIAGNOSIS — L89.45 PRESSURE ULCER OF CONTIGUOUS SITE OF BACK, BUTTOCK AND HIP, UNSTAGEABLE: ICD-10-CM

## 2024-01-23 LAB
ANION GAP SERPL CALC-SCNC: 8 MMOL/L — SIGNIFICANT CHANGE UP (ref 5–17)
BUN SERPL-MCNC: 14 MG/DL — SIGNIFICANT CHANGE UP (ref 7–23)
CALCIUM SERPL-MCNC: 9.4 MG/DL — SIGNIFICANT CHANGE UP (ref 8.4–10.5)
CHLORIDE SERPL-SCNC: 103 MMOL/L — SIGNIFICANT CHANGE UP (ref 96–108)
CO2 SERPL-SCNC: 30 MMOL/L — SIGNIFICANT CHANGE UP (ref 22–31)
CREAT SERPL-MCNC: 0.93 MG/DL — SIGNIFICANT CHANGE UP (ref 0.5–1.3)
EGFR: 65 ML/MIN/1.73M2 — SIGNIFICANT CHANGE UP
GLUCOSE SERPL-MCNC: 117 MG/DL — HIGH (ref 70–99)
HCT VFR BLD CALC: 41.2 % — SIGNIFICANT CHANGE UP (ref 34.5–45)
HGB BLD-MCNC: 14 G/DL — SIGNIFICANT CHANGE UP (ref 11.5–15.5)
MCHC RBC-ENTMCNC: 29.4 PG — SIGNIFICANT CHANGE UP (ref 27–34)
MCHC RBC-ENTMCNC: 34 GM/DL — SIGNIFICANT CHANGE UP (ref 32–36)
MCV RBC AUTO: 86.4 FL — SIGNIFICANT CHANGE UP (ref 80–100)
NRBC # BLD: 0 /100 WBCS — SIGNIFICANT CHANGE UP (ref 0–0)
PLATELET # BLD AUTO: 235 K/UL — SIGNIFICANT CHANGE UP (ref 150–400)
POTASSIUM SERPL-MCNC: 3.9 MMOL/L — SIGNIFICANT CHANGE UP (ref 3.5–5.3)
POTASSIUM SERPL-SCNC: 3.9 MMOL/L — SIGNIFICANT CHANGE UP (ref 3.5–5.3)
RBC # BLD: 4.77 M/UL — SIGNIFICANT CHANGE UP (ref 3.8–5.2)
RBC # FLD: 12.2 % — SIGNIFICANT CHANGE UP (ref 10.3–14.5)
SODIUM SERPL-SCNC: 141 MMOL/L — SIGNIFICANT CHANGE UP (ref 135–145)
WBC # BLD: 8.9 K/UL — SIGNIFICANT CHANGE UP (ref 3.8–10.5)
WBC # FLD AUTO: 8.9 K/UL — SIGNIFICANT CHANGE UP (ref 3.8–10.5)

## 2024-01-23 PROCEDURE — 85027 COMPLETE CBC AUTOMATED: CPT

## 2024-01-23 PROCEDURE — 93005 ELECTROCARDIOGRAM TRACING: CPT

## 2024-01-23 PROCEDURE — 93010 ELECTROCARDIOGRAM REPORT: CPT | Mod: NC

## 2024-01-23 PROCEDURE — G0463: CPT

## 2024-01-23 PROCEDURE — 36415 COLL VENOUS BLD VENIPUNCTURE: CPT

## 2024-01-23 PROCEDURE — 80048 BASIC METABOLIC PNL TOTAL CA: CPT

## 2024-01-23 RX ORDER — CLOPIDOGREL BISULFATE 75 MG/1
1 TABLET, FILM COATED ORAL
Refills: 0 | DISCHARGE

## 2024-01-23 RX ORDER — PANTOPRAZOLE SODIUM 20 MG/1
1 TABLET, DELAYED RELEASE ORAL
Refills: 0 | DISCHARGE

## 2024-01-23 NOTE — H&P PST ADULT - OTHER CARE PROVIDERS
Dr Beach - [phone] cardio  Dr Hillman Cardiothorasic  [facility] Beach - [phone] cardio  Dr Hillman Cardiothoracic  Dr Dye- ryan  Dr Staples - y

## 2024-01-23 NOTE — H&P PST ADULT - NSANTHOSAYNRD_GEN_A_CORE
neck 20 inches/No. MICHELLE screening performed.  STOP BANG Legend: 0-2 = LOW Risk; 3-4 = INTERMEDIATE Risk; 5-8 = HIGH Risk

## 2024-01-23 NOTE — H&P PST ADULT - NSICDXPASTMEDICALHX_GEN_ALL_CORE_FT
PAST MEDICAL HISTORY:  Anxiousness concern re surgery    Arm fracture, right 2010  s/p fall    Benign hypertension     Bipolar disorder oral medication    CAD (coronary artery disease)     Cancer of skin multiple locations several have been removed and or laser    face (forehead), nose, arms    Cloudy urine     Conjunctivitis, bacterial     COPD (chronic obstructive pulmonary disease)     Gait disturbance     H/O gastroesophageal reflux (GERD)     Kidney lesion     Myocardial infarct, old 2016    Unilateral primary osteoarthritis, left hip     Weight loss attempting to loss weight states so far has lost about 10 pounds     PAST MEDICAL HISTORY:  Anxiousness concern re surgery    Arm fracture, right 2010  s/p fall    Benign hypertension     Bipolar disorder oral medication    CAD (coronary artery disease)     Cancer of skin multiple locations several have been removed and or laser    face (forehead), nose, arms    Cloudy urine     Conjunctivitis, bacterial     COPD (chronic obstructive pulmonary disease)     Gait disturbance     H/O gastroesophageal reflux (GERD)     HLD (hyperlipidemia)     HTN (hypertension)     Kidney lesion     Myocardial infarct, old 2016    Unilateral primary osteoarthritis, left hip     Weight loss attempting to loss weight states so far has lost about 10 pounds

## 2024-01-23 NOTE — H&P PST ADULT - PROBLEM SELECTOR PLAN 1
Scheduled for wide excision melanoma of back with Dr. Fried on 02/05/2024.  Pre op instructions given and patient verbalized understanding.  CBC, BMP, EKG, medical and cardiac clearance pending.  NPO after midnight night before procedure.  May take coreg AM of procedure with small sip of water.  TO stop all NSAIDs, vitamins and supplements 1 week prior to procedure.  Chlorhexidene wash given with instructions.

## 2024-01-23 NOTE — H&P PST ADULT - HISTORY OF PRESENT ILLNESS
73 year old female with hx of CAD x 2 stents, Bipolar Disease, Osteoarthritis, squamous cell carcinoma (s/p VATS 9/2023) presents for PST.  Scheduled for wide excision melanoma of back on 02/03/2024.   73 year old female with hx of CAD x 2 stents, Bipolar Disease, HTN, HLD, Osteoarthritis, and squamous cell carcinoma (s/p VATS 9/2023) presents for PST.  C/O abnormal skin finding on back that was bx and diagnosed with melanoma recommended for surgical removal.  Other wise feeling well at [facility] today with no recent cough fever, or acute illness Scheduled for wide excision melanoma of back on 02/03/2024.

## 2024-01-23 NOTE — H&P PST ADULT - AGENT'S NAME
Regarding: Post procedure shocking sensation in arm  ----- Message from Vishal Albert sent at 6/18/2022 12:37 PM EDT -----  "I had a lipoma removed from my arm and now I have a shocking sensation and pins and needles in my arm " Adrianna Morse- Emiliano -   daughter

## 2024-01-23 NOTE — H&P PST ADULT - NSICDXPASTSURGICALHX_GEN_ALL_CORE_FT
PAST SURGICAL HISTORY:  History of hip replacement May 2020    Nodule of right lung     S/P cardiac catheterization x 4    S/P tonsillectomy childhood

## 2024-01-23 NOTE — H&P PST ADULT - LAST CARDIAC ANGIOGRAM/IMAGING
Normal rate, regular rhythm.  Heart sounds S1, S2.  No murmurs, rubs or gallops.
2 years ago with stent placements

## 2024-01-24 PROBLEM — I10 ESSENTIAL (PRIMARY) HYPERTENSION: Chronic | Status: ACTIVE | Noted: 2024-01-23

## 2024-01-24 PROBLEM — E78.5 HYPERLIPIDEMIA, UNSPECIFIED: Chronic | Status: ACTIVE | Noted: 2024-01-23

## 2024-01-25 ENCOUNTER — APPOINTMENT (OUTPATIENT)
Dept: CARDIOLOGY | Facility: CLINIC | Age: 74
End: 2024-01-25
Payer: MEDICARE

## 2024-01-25 ENCOUNTER — NON-APPOINTMENT (OUTPATIENT)
Age: 74
End: 2024-01-25

## 2024-01-25 VITALS
SYSTOLIC BLOOD PRESSURE: 115 MMHG | DIASTOLIC BLOOD PRESSURE: 69 MMHG | HEART RATE: 87 BPM | BODY MASS INDEX: 42.33 KG/M2 | HEIGHT: 62 IN | WEIGHT: 230 LBS

## 2024-01-25 PROCEDURE — 93000 ELECTROCARDIOGRAM COMPLETE: CPT | Mod: NC

## 2024-01-25 PROCEDURE — 99214 OFFICE O/P EST MOD 30 MIN: CPT | Mod: 25

## 2024-01-26 ENCOUNTER — APPOINTMENT (OUTPATIENT)
Dept: FAMILY MEDICINE | Facility: CLINIC | Age: 74
End: 2024-01-26
Payer: MEDICARE

## 2024-01-26 VITALS
TEMPERATURE: 98 F | DIASTOLIC BLOOD PRESSURE: 65 MMHG | RESPIRATION RATE: 16 BRPM | WEIGHT: 230 LBS | OXYGEN SATURATION: 94 % | SYSTOLIC BLOOD PRESSURE: 94 MMHG | BODY MASS INDEX: 42.07 KG/M2 | HEART RATE: 62 BPM

## 2024-01-26 PROCEDURE — 99214 OFFICE O/P EST MOD 30 MIN: CPT

## 2024-01-27 LAB
APTT BLD: 27 SEC
INR PPP: 0.9 RATIO
PT BLD: 10.2 SEC

## 2024-01-27 RX ORDER — ALBUTEROL SULFATE 90 UG/1
108 (90 BASE) INHALANT RESPIRATORY (INHALATION)
Qty: 1 | Refills: 1 | Status: COMPLETED | COMMUNITY
Start: 2023-12-29 | End: 2024-01-27

## 2024-01-27 RX ORDER — BENZONATATE 100 MG/1
100 CAPSULE ORAL
Qty: 1 | Refills: 1 | Status: COMPLETED | COMMUNITY
Start: 2023-12-29 | End: 2024-01-27

## 2024-01-27 NOTE — HISTORY OF PRESENT ILLNESS
[COPD] : COPD [(Patient denies any chest pain, claudication, dyspnea on exertion, orthopnea, palpitations or syncope)] : Patient denies any chest pain, claudication, dyspnea on exertion, orthopnea, palpitations or syncope [Poor (<4 METs)] : Poor (<4 METs) [Aortic Stenosis] : no aortic stenosis [Recent Myocardial Infarction] : no recent myocardial infarction [Implantable Device/Pacemaker] : no implantable device/pacemaker [Asthma] : no asthma [Sleep Apnea] : no sleep apnea [Smoker] : not a smoker [Family Member] : no family member with adverse anesthesia reaction/sudden death [Self] : no previous adverse anesthesia reaction [Chronic Anticoagulation] : no chronic anticoagulation [Chronic Kidney Disease] : no chronic kidney disease [Diabetes] : no diabetes [FreeTextEntry1] : cutaneous dermatologic surgery for melanoma [FreeTextEntry2] : Monday 2/5/2024 [FreeTextEntry3] : Dr. Harjinder Fried

## 2024-01-27 NOTE — REVIEW OF SYSTEMS
[Vision Problems] : vision problems [Nocturia] : nocturia [Joint Pain] : joint pain [Memory Loss] : memory loss [Depression] : depression [Fever] : no fever [Chills] : no chills [Fatigue] : no fatigue [Hot Flashes] : no hot flashes [Night Sweats] : no night sweats [Recent Change In Weight] : ~T no recent weight change [Discharge] : no discharge [Pain] : no pain [Redness] : no redness [Dryness] : no dryness  [Itching] : no itching [Earache] : no earache [Hearing Loss] : no hearing loss [Nosebleed] : no nosebleeds [Hoarseness] : no hoarseness [Nasal Discharge] : no nasal discharge [Sore Throat] : no sore throat [Postnasal Drip] : no postnasal drip [Chest Pain] : no chest pain [Palpitations] : no palpitations [Leg Claudication] : no leg claudication [Lower Ext Edema] : no lower extremity edema [Shortness Of Breath] : no shortness of breath [Wheezing] : no wheezing [Cough] : no cough [Dyspnea on Exertion] : no dyspnea on exertion [Abdominal Pain] : no abdominal pain [Nausea] : no nausea [Constipation] : no constipation [Diarrhea] : diarrhea [Vomiting] : no vomiting [Heartburn] : no heartburn [Melena] : no melena [Dysuria] : no dysuria [Incontinence] : no incontinence [Hematuria] : no hematuria [Frequency] : no frequency [Joint Stiffness] : no joint stiffness [Joint Swelling] : no joint swelling [Muscle Weakness] : no muscle weakness [Muscle Pain] : no muscle pain [Back Pain] : no back pain [Itching] : no itching [Mole Changes] : no mole changes [Nail Changes] : no nail changes [Hair Changes] : no hair changes [Skin Rash] : no skin rash [Headache] : no headache [Dizziness] : no dizziness [Fainting] : no fainting [Confusion] : no confusion [Unsteady Walking] : no ataxia [Suicidal] : not suicidal [Insomnia] : no insomnia [Anxiety] : no anxiety [Easy Bleeding] : no easy bleeding [Easy Bruising] : no easy bruising [Swollen Glands] : no swollen glands [FreeTextEntry3] : glasses bifocals  [FreeTextEntry6] : some SOB with 2 flights of stairs  [FreeTextEntry8] : 2 nocturia [FreeTextEntry9] : right hip and knee joint pain [de-identified] : +xerosis of lower back [de-identified] : sometimes feels down

## 2024-01-27 NOTE — ASSESSMENT
[High Risk Surgery - Intraperitoneal, Intrathoracic or Supringuinal Vascular Procedures] : High Risk Surgery - Intraperitoneal, Intrathoracic or Supringuinal Vascular Procedures - No (0) [Congestive Heart Failure] : Congestive Heart Failure - No (0) [Ischemic Heart Disease] : Ischemic Heart Disease  - Yes (1) [Prior Cerebrovascular Accident or TIA] : Prior Cerebrovascular Accident or TIA - No (0) [Creatinine >= 2mg/dL (1 Point)] : Creatinine >= 2mg/dL - No (0) [Insulin-dependent Diabetic (1 Point)] : Insulin-dependent Diabetic - No (0) [1] : 1 , RCRI Class: II, Risk of Post-Op Cardiac Complications: 6.0%, 95% CI for Risk Estimate: 4.9% - 7.4% [Patient Optimized for Surgery] : Patient optimized for surgery [As per surgery] : as per surgery [FreeTextEntry2] : morning of procedure: carvedilol 3.125 mg, losartan 25 mg, 81 mg of aspirin, and lamotrigine 125 mg only with sips of water only

## 2024-01-27 NOTE — PHYSICAL EXAM
[No Acute Distress] : no acute distress [Well Nourished] : well nourished [Well Developed] : well developed [Well-Appearing] : well-appearing [Normal Voice/Communication] : normal voice/communication [Normal Sclera/Conjunctiva] : normal sclera/conjunctiva [PERRL] : pupils equal round and reactive to light [Normal Outer Ear/Nose] : the outer ears and nose were normal in appearance [Normal Oropharynx] : the oropharynx was normal [No Lymphadenopathy] : no lymphadenopathy [Normal TMs] : both tympanic membranes were normal [Thyroid Normal, No Nodules] : the thyroid was normal and there were no nodules present [Supple] : supple [No Respiratory Distress] : no respiratory distress  [No Accessory Muscle Use] : no accessory muscle use [Clear to Auscultation] : lungs were clear to auscultation bilaterally [Normal Rate] : normal rate  [Regular Rhythm] : with a regular rhythm [Soft] : abdomen soft [Normal S1, S2] : normal S1 and S2 [Non Tender] : non-tender [No Masses] : no abdominal mass palpated [Non-distended] : non-distended [No HSM] : no HSM [Normal Supraclavicular Nodes] : no supraclavicular lymphadenopathy [Normal Anterior Cervical Nodes] : no anterior cervical lymphadenopathy [Coordination Grossly Intact] : coordination grossly intact [No Focal Deficits] : no focal deficits [Speech Grossly Normal] : speech grossly normal [Normal Gait] : normal gait [Memory Grossly Normal] : memory grossly normal [Normal Affect] : the affect was normal [Alert and Oriented x3] : oriented to person, place, and time [Normal Mood] : the mood was normal [Normal Insight/Judgement] : insight and judgment were intact [EOMI] : extraocular movements intact [de-identified] : trace pitting edema b/l

## 2024-01-27 NOTE — PLAN
[FreeTextEntry1] : intermediate-risk, ASA class II anesthesia risk patient for low-risk surgery  Medications reviewed with Batool who acknowledged understanding

## 2024-01-27 NOTE — RESULTS/DATA
Patient: Jacqui Sanchez  * No surgery found *  Anesthesia type: [unfilled]    Patient location: Labor and Delivery  Last vitals:   Vitals:    12/05/17 0745   BP: 99/80   Pulse: 88   Resp: 20   Temp: 98.4 °F (36.9 °C)   SpO2: 98%     Level of consciousness: awake, alert and oriented    Post-anesthesia pain: adequate analgesia  Airway patency: patent  Respiratory: unassisted  Cardiovascular: stable and blood pressure at baseline  Hydration: euvolemic    Anesthetic complications: no   [] : results reviewed [de-identified] : normal [de-identified] : cardiology consult appreciated

## 2024-01-30 ENCOUNTER — APPOINTMENT (OUTPATIENT)
Dept: HEMATOLOGY ONCOLOGY | Facility: CLINIC | Age: 74
End: 2024-01-30
Payer: MEDICARE

## 2024-01-30 VITALS
SYSTOLIC BLOOD PRESSURE: 112 MMHG | HEIGHT: 62.01 IN | WEIGHT: 229.99 LBS | BODY MASS INDEX: 41.79 KG/M2 | RESPIRATION RATE: 16 BRPM | HEART RATE: 84 BPM | DIASTOLIC BLOOD PRESSURE: 74 MMHG | OXYGEN SATURATION: 98 % | TEMPERATURE: 98 F

## 2024-01-30 PROCEDURE — 99213 OFFICE O/P EST LOW 20 MIN: CPT

## 2024-01-30 NOTE — ASSESSMENT
[FreeTextEntry1] : CT chest report, 1/3/24 thoracic surgery note reviewed. Stage IAI (T1aN0) RUL SCCA Lung. 9/7/2023-Status post right VATS/RUL, wedge resection, MLND -- h/o elevated alkaline phosphatase noted-?secondary to cholesterol medication 11/7/2023 PET/CT scan- 1. Postsurgical changes in the right upper lobe with nonspecific activity. Nonavid relatively stable bilateral lower lobe subcentimeter nodules and a new left upper lobe subcentimeter nodule below PET detection; reassess on follow-up. 2. No suspicious FDG avid malignant lesion in the remaining field of view. --with Stage IAl disease, as per NCCN guidelines, observation was recommended. 12/14/2023-CT chest-Unchanged right lung wedge resection and small nodules in both lungs. Her next surveillance CT chest has been ordered by her thoracic surgeon for 4/2024. --clinically stable at present  Patient was given the opportunity to ask questions.  Her questions have been answered to her apparent satisfaction at this time.  She expressed her understanding and willingness to comply with recommended follow-up.  -->RTO 4 months, or earlier as clinically indicated.

## 2024-01-30 NOTE — HISTORY OF PRESENT ILLNESS
[de-identified] : 73-year-old female, former smoker, 1 pack/day x 48 years, quitting 2019, then using vaping products-quit 2 months ago.  History of EtOH abuse-quit 35 years ago.  Bipolar disorder.  7/28/2023-Screening annual CT chest-emphysema.  7 mm right upper lobe nodule with ill-defined borders enlarged from prior study.  No PET scan done.  9/7/2023-Status post right VATS, right upper lobe wedge resection and MLND.  Pathology revealed 1 cm keratinizing squamous cell carcinoma.  No visceral pleural invasion.  Surgical margins negative.  All regional lymph nodes negative for tumor.   [de-identified] : SCCA [de-identified] : States has skin cancer to be removed surgically next week.  No current pulmonary/GI//bony/neurologic complaints.  No abnormal bleeding reported.  No fevers. Sees Dr. Staples (Psych). in [place]. Friend Ying present.

## 2024-02-02 ENCOUNTER — RESULT REVIEW (OUTPATIENT)
Age: 74
End: 2024-02-02

## 2024-02-04 ENCOUNTER — TRANSCRIPTION ENCOUNTER (OUTPATIENT)
Age: 74
End: 2024-02-04

## 2024-02-05 ENCOUNTER — OUTPATIENT (OUTPATIENT)
Dept: OUTPATIENT SERVICES | Facility: HOSPITAL | Age: 74
LOS: 1 days | End: 2024-02-05
Payer: MEDICARE

## 2024-02-05 ENCOUNTER — APPOINTMENT (OUTPATIENT)
Dept: SURGERY | Facility: HOSPITAL | Age: 74
End: 2024-02-05

## 2024-02-05 ENCOUNTER — RESULT REVIEW (OUTPATIENT)
Age: 74
End: 2024-02-05

## 2024-02-05 ENCOUNTER — TRANSCRIPTION ENCOUNTER (OUTPATIENT)
Age: 74
End: 2024-02-05

## 2024-02-05 VITALS
HEART RATE: 83 BPM | SYSTOLIC BLOOD PRESSURE: 131 MMHG | OXYGEN SATURATION: 98 % | TEMPERATURE: 98 F | WEIGHT: 231.49 LBS | HEIGHT: 63 IN | DIASTOLIC BLOOD PRESSURE: 84 MMHG | RESPIRATION RATE: 14 BRPM

## 2024-02-05 VITALS
SYSTOLIC BLOOD PRESSURE: 113 MMHG | HEART RATE: 82 BPM | RESPIRATION RATE: 16 BRPM | TEMPERATURE: 98 F | OXYGEN SATURATION: 95 % | DIASTOLIC BLOOD PRESSURE: 67 MMHG

## 2024-02-05 DIAGNOSIS — C43.9 MALIGNANT MELANOMA OF SKIN, UNSPECIFIED: ICD-10-CM

## 2024-02-05 DIAGNOSIS — R91.1 SOLITARY PULMONARY NODULE: Chronic | ICD-10-CM

## 2024-02-05 DIAGNOSIS — Z96.649 PRESENCE OF UNSPECIFIED ARTIFICIAL HIP JOINT: Chronic | ICD-10-CM

## 2024-02-05 DIAGNOSIS — Z90.89 ACQUIRED ABSENCE OF OTHER ORGANS: Chronic | ICD-10-CM

## 2024-02-05 DIAGNOSIS — Z98.890 OTHER SPECIFIED POSTPROCEDURAL STATES: Chronic | ICD-10-CM

## 2024-02-05 DIAGNOSIS — L89.45 PRESSURE ULCER OF CONTIGUOUS SITE OF BACK, BUTTOCK AND HIP, UNSTAGEABLE: ICD-10-CM

## 2024-02-05 PROCEDURE — 11606 EXC TR-EXT MAL+MARG >4 CM: CPT

## 2024-02-05 PROCEDURE — 88305 TISSUE EXAM BY PATHOLOGIST: CPT | Mod: 26

## 2024-02-05 PROCEDURE — 88341 IMHCHEM/IMCYTCHM EA ADD ANTB: CPT

## 2024-02-05 PROCEDURE — 88342 IMHCHEM/IMCYTCHM 1ST ANTB: CPT | Mod: 26

## 2024-02-05 PROCEDURE — 88305 TISSUE EXAM BY PATHOLOGIST: CPT

## 2024-02-05 PROCEDURE — 12032 INTMD RPR S/A/T/EXT 2.6-7.5: CPT

## 2024-02-05 PROCEDURE — 88341 IMHCHEM/IMCYTCHM EA ADD ANTB: CPT | Mod: 26

## 2024-02-05 RX ORDER — ROSUVASTATIN CALCIUM 5 MG/1
1 TABLET ORAL
Refills: 0 | DISCHARGE

## 2024-02-05 RX ORDER — SODIUM CHLORIDE 9 MG/ML
1000 INJECTION, SOLUTION INTRAVENOUS
Refills: 0 | Status: DISCONTINUED | OUTPATIENT
Start: 2024-02-05 | End: 2024-02-05

## 2024-02-05 RX ORDER — OLANZAPINE 15 MG/1
1 TABLET, FILM COATED ORAL
Refills: 0 | DISCHARGE

## 2024-02-05 RX ORDER — LOSARTAN POTASSIUM 100 MG/1
1 TABLET, FILM COATED ORAL
Refills: 0 | DISCHARGE

## 2024-02-05 RX ORDER — LAMOTRIGINE 25 MG/1
0 TABLET, ORALLY DISINTEGRATING ORAL
Refills: 0 | DISCHARGE

## 2024-02-05 RX ORDER — HYDROMORPHONE HYDROCHLORIDE 2 MG/ML
0.25 INJECTION INTRAMUSCULAR; INTRAVENOUS; SUBCUTANEOUS
Refills: 0 | Status: DISCONTINUED | OUTPATIENT
Start: 2024-02-05 | End: 2024-02-05

## 2024-02-05 RX ORDER — ONDANSETRON 8 MG/1
4 TABLET, FILM COATED ORAL ONCE
Refills: 0 | Status: DISCONTINUED | OUTPATIENT
Start: 2024-02-05 | End: 2024-02-05

## 2024-02-05 RX ORDER — HYDROMORPHONE HYDROCHLORIDE 2 MG/ML
0.5 INJECTION INTRAMUSCULAR; INTRAVENOUS; SUBCUTANEOUS
Refills: 0 | Status: DISCONTINUED | OUTPATIENT
Start: 2024-02-05 | End: 2024-02-05

## 2024-02-05 RX ORDER — NALTREXONE HYDROCHLORIDE 50 MG/1
1 TABLET, FILM COATED ORAL
Refills: 0 | DISCHARGE

## 2024-02-05 RX ORDER — BUPROPION HYDROCHLORIDE 150 MG/1
1 TABLET, EXTENDED RELEASE ORAL
Refills: 0 | DISCHARGE

## 2024-02-05 RX ORDER — TRAZODONE HCL 50 MG
1 TABLET ORAL
Refills: 0 | DISCHARGE

## 2024-02-05 RX ORDER — CARVEDILOL PHOSPHATE 80 MG/1
1 CAPSULE, EXTENDED RELEASE ORAL
Qty: 0 | Refills: 0 | DISCHARGE

## 2024-02-05 RX ORDER — ASPIRIN/CALCIUM CARB/MAGNESIUM 324 MG
1 TABLET ORAL
Refills: 0 | DISCHARGE

## 2024-02-05 NOTE — ASU PATIENT PROFILE, ADULT - FALL HARM RISK - RISK INTERVENTIONS

## 2024-02-05 NOTE — ASU PREOP CHECKLIST - SKIN PREP
Great -- thank you for coordinating appt to complete paperwork. Will look forward to seeing him this Friday.    CHG wipes/done

## 2024-02-05 NOTE — ASU DISCHARGE PLAN (ADULT/PEDIATRIC) - CARE PROVIDER_API CALL
Harjinder Fried.  Surgery  [address], [address]  [place] [address]  Phone: [phone]  Fax: [phone]  Follow Up Time: 2 weeks

## 2024-02-05 NOTE — ASU PATIENT PROFILE, ADULT - NSICDXPASTMEDICALHX_GEN_ALL_CORE_FT
PAST MEDICAL HISTORY:  Anxiousness concern re surgery    Arm fracture, right 2010  s/p fall    Benign hypertension     Bipolar disorder oral medication    CAD (coronary artery disease)     Cancer of skin multiple locations several have been removed and or laser    face (forehead), nose, arms    Cloudy urine     Conjunctivitis, bacterial     COPD (chronic obstructive pulmonary disease)     Gait disturbance     H/O gastroesophageal reflux (GERD)     HLD (hyperlipidemia)     HTN (hypertension)     Kidney lesion     Myocardial infarct, old 2016    Unilateral primary osteoarthritis, left hip     Weight loss attempting to loss weight states so far has lost about 10 pounds

## 2024-02-06 PROCEDURE — ZZZZZ: CPT

## 2024-02-12 LAB — SURGICAL PATHOLOGY STUDY: SIGNIFICANT CHANGE UP

## 2024-02-21 ENCOUNTER — APPOINTMENT (OUTPATIENT)
Dept: SURGERY | Facility: CLINIC | Age: 74
End: 2024-02-21
Payer: MEDICARE

## 2024-02-21 VITALS
RESPIRATION RATE: 16 BRPM | OXYGEN SATURATION: 100 % | HEIGHT: 62 IN | TEMPERATURE: 96.7 F | HEART RATE: 94 BPM | WEIGHT: 230 LBS | BODY MASS INDEX: 42.33 KG/M2

## 2024-02-21 PROCEDURE — 99024 POSTOP FOLLOW-UP VISIT: CPT

## 2024-03-04 ENCOUNTER — APPOINTMENT (OUTPATIENT)
Dept: SURGERY | Facility: CLINIC | Age: 74
End: 2024-03-04
Payer: MEDICARE

## 2024-03-04 VITALS
TEMPERATURE: 97.6 F | HEIGHT: 62 IN | WEIGHT: 230 LBS | OXYGEN SATURATION: 95 % | BODY MASS INDEX: 42.33 KG/M2 | RESPIRATION RATE: 16 BRPM | HEART RATE: 90 BPM

## 2024-03-04 PROCEDURE — 99024 POSTOP FOLLOW-UP VISIT: CPT

## 2024-03-04 RX ORDER — NALTREXONE HYDROCHLORIDE 50 MG/1
50 TABLET, FILM COATED ORAL
Qty: 30 | Refills: 0 | Status: ACTIVE | COMMUNITY
Start: 2023-10-12

## 2024-03-04 RX ORDER — TRAZODONE HYDROCHLORIDE 100 MG/1
100 TABLET ORAL
Qty: 60 | Refills: 0 | Status: ACTIVE | COMMUNITY
Start: 2023-11-02

## 2024-03-18 ENCOUNTER — APPOINTMENT (OUTPATIENT)
Dept: SURGERY | Facility: CLINIC | Age: 74
End: 2024-03-18
Payer: MEDICARE

## 2024-03-18 ENCOUNTER — LABORATORY RESULT (OUTPATIENT)
Age: 74
End: 2024-03-18

## 2024-03-18 PROCEDURE — 11604 EXC TR-EXT MAL+MARG 3.1-4 CM: CPT | Mod: 79

## 2024-03-18 PROCEDURE — 12032 INTMD RPR S/A/T/EXT 2.6-7.5: CPT | Mod: 79

## 2024-03-23 ENCOUNTER — NON-APPOINTMENT (OUTPATIENT)
Age: 74
End: 2024-03-23

## 2024-03-27 ENCOUNTER — APPOINTMENT (OUTPATIENT)
Dept: SURGERY | Facility: CLINIC | Age: 74
End: 2024-03-27
Payer: MEDICARE

## 2024-03-27 VITALS — HEIGHT: 62 IN | WEIGHT: 230 LBS | TEMPERATURE: 96.6 F | BODY MASS INDEX: 42.33 KG/M2

## 2024-03-27 PROCEDURE — 99024 POSTOP FOLLOW-UP VISIT: CPT

## 2024-03-27 NOTE — PROCEDURE
[FreeTextEntry1] : 1.0 % xylocaine Wide excision melanoma of back excision 4.2 cm. x 2.1 cm excision deep into underlying muscle Previous scar excised 2 layer closure 3, #4-0 Vicryl sutures 5, #4-0 nylon sutures

## 2024-04-10 ENCOUNTER — APPOINTMENT (OUTPATIENT)
Dept: SURGERY | Facility: CLINIC | Age: 74
End: 2024-04-10
Payer: MEDICARE

## 2024-04-10 VITALS
HEART RATE: 95 BPM | HEIGHT: 62 IN | TEMPERATURE: 97.3 F | OXYGEN SATURATION: 95 % | WEIGHT: 232 LBS | BODY MASS INDEX: 42.69 KG/M2

## 2024-04-10 DIAGNOSIS — C43.59 MALIGNANT MELANOMA OF OTHER PART OF TRUNK: ICD-10-CM

## 2024-04-10 PROCEDURE — 99024 POSTOP FOLLOW-UP VISIT: CPT

## 2024-05-02 NOTE — REASON FOR VISIT
6 months follow up
[Follow-Up: _____] : a [unfilled] follow-up visit
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 Denies any fevers/chills, uri sxs, cough, abd pain, n/v, dark/bloody stools, urinary sxs, leg swelling. Chest discomfort not radiating to arms/shoulders/back, not exertional, not tearing in quality. Pt walks with cane at baseline.

## 2024-05-03 ENCOUNTER — APPOINTMENT (OUTPATIENT)
Dept: CT IMAGING | Facility: HOSPITAL | Age: 74
End: 2024-05-03

## 2024-05-07 ENCOUNTER — APPOINTMENT (OUTPATIENT)
Dept: CT IMAGING | Facility: HOSPITAL | Age: 74
End: 2024-05-07
Payer: MEDICARE

## 2024-05-07 ENCOUNTER — OUTPATIENT (OUTPATIENT)
Dept: OUTPATIENT SERVICES | Facility: HOSPITAL | Age: 74
LOS: 1 days | End: 2024-05-07
Payer: MEDICARE

## 2024-05-07 DIAGNOSIS — Z90.89 ACQUIRED ABSENCE OF OTHER ORGANS: Chronic | ICD-10-CM

## 2024-05-07 DIAGNOSIS — C34.91 MALIGNANT NEOPLASM OF UNSPECIFIED PART OF RIGHT BRONCHUS OR LUNG: ICD-10-CM

## 2024-05-07 DIAGNOSIS — R91.1 SOLITARY PULMONARY NODULE: Chronic | ICD-10-CM

## 2024-05-07 PROCEDURE — 71250 CT THORAX DX C-: CPT | Mod: 26,MH

## 2024-05-07 PROCEDURE — 71250 CT THORAX DX C-: CPT

## 2024-05-08 ENCOUNTER — APPOINTMENT (OUTPATIENT)
Dept: THORACIC SURGERY | Facility: CLINIC | Age: 74
End: 2024-05-08
Payer: MEDICARE

## 2024-05-08 VITALS
WEIGHT: 232 LBS | SYSTOLIC BLOOD PRESSURE: 123 MMHG | OXYGEN SATURATION: 94 % | DIASTOLIC BLOOD PRESSURE: 80 MMHG | BODY MASS INDEX: 42.69 KG/M2 | HEIGHT: 62 IN | HEART RATE: 95 BPM

## 2024-05-08 PROCEDURE — G2211 COMPLEX E/M VISIT ADD ON: CPT

## 2024-05-08 PROCEDURE — 99213 OFFICE O/P EST LOW 20 MIN: CPT

## 2024-05-08 NOTE — CONSULT LETTER
[Dear  ___] : Dear  [unfilled], [Consult Letter:] : I had the pleasure of evaluating your patient, [unfilled]. [( Thank you for referring [unfilled] for consultation for _____ )] : Thank you for referring [unfilled] for consultation for [unfilled] [Please see my note below.] : Please see my note below. [Consult Closing:] : Thank you very much for allowing me to participate in the care of this patient.  If you have any questions, please do not hesitate to contact me. [Sincerely,] : Sincerely, [FreeTextEntry2] : Dr. Rickie Costa (PCP/Ref) [FreeTextEntry3] : Janes Hillman MD  Attending Surgeon  Division of Thoracic Surgery  , Cardiovascular and Thoracic Surgery  [facility]

## 2024-05-08 NOTE — HISTORY OF PRESENT ILLNESS
[FreeTextEntry1] : Ms. NATHANIEL BRAVO, 73 year old female, former smoker (1 ppd since age 21, quit 2019), current user of vaping products, hx of ETOH abuse, w/ hx of CAD s/p 2 stents (2019, then 08/2020 on Plavix), MI (08/2020), HLD, HTN, BCC of the forehead, Bipolar Disorder, who referred by Dr. Rickie Costa (PCP) for increasing size lung nodule.  Patient is s/p FB, Right VATS, RUL wedge resection and MLND on 09/07/2023. 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 No visceral pleural invasion is present. The surgical margin is negative.  Patient is s/p back melanoma excision on 02/05/2024.   CT chest on 05/03/2024: - official result pending  Patient is here today for a follow up. She reports feeling well, denies any SOB, CP or cough.

## 2024-05-08 NOTE — ASSESSMENT
[FreeTextEntry1] : Ms. NATHANIEL BRAVO, 73-year-old female who is s/p FB, Right VATS, RUL wedge resection and MLND on 09/07/2023for a 1cm squamous cell carcinoma pT1aN0. Most recent CT of the chest shows no evidence of new or recurrent disease. I have asked the patient to obtain a repeat CT of the chest in 6 months and to follow up with me in my office at that time. I have answered all of her questions, and she understands the importance of follow up.  I, LACI Guallpa, personally performed the evaluation and management (E/M) services for this established patient who presents today with (a) new problem(s)/exacerbation of (an) existing condition(s). That E/M includes conducting the examination, assessing all new/exacerbated conditions, and establishing a new plan of care. Today, my ACP, Erin Maurer NP was here to observe my evaluation and management services for this new problem/exacerbated condition to be followed going forward.

## 2024-05-13 ENCOUNTER — NON-APPOINTMENT (OUTPATIENT)
Age: 74
End: 2024-05-13

## 2024-05-13 NOTE — H&P PST ADULT - MS GEN HX ROS MEA POS PC
significant edema, no varicose veins    Abdomen - No masses, tenderness, or organomegaly, no hepato-splenomegally, no bruits  Musculoskeletal - No significant edema, no kyphosis or scoliosis, no deformity in any extremity noted, muscle strength and tone are normal  Skin: no ulcer,no scar,no stasis dermatitis   Neurologic - alert oriented times 3,Cranial nerves II through XII are grossly intact.  There were no gross focal neurologic abnormalities.   Psychiatric: normal mood and affect    No results found for: \"CKTOTAL\", \"CKMB\", \"CKMBINDEX\", \"TROPONINI\"  BNP:  No results found for: \"BNP\"  PT/INR:  No results found for: \"PTINR\"  Lab Results   Component Value Date    LABA1C 5.7 2020     Lab Results   Component Value Date    CHOL 162 2022    TRIG 102 2022    HDL 64 2022    LDLDIRECT 109 (H) 2015     Lab Results   Component Value Date    ALT 16 2022    AST 40 (H) 2022     TSH:  No results found for: \"TSH\"    EKG: sinus bradycardia@ 58bpm  Impression:  Talia is a 81 y.o.year old who  has a past medical history of CAD (coronary artery disease), COPD (chronic obstructive pulmonary disease) (HCC), History of , History of cardiac cath, History of cardiac monitoring, History of exercise stress test, Hx of CABG, Hx of Doppler echocardiogram, Hx of Doppler ultrasound, Hyperlipidemia, Hypertension, and Sjoegren syndrome. and presents with     Plan:  Sinus bradycardia: reduced coreg to 6.25mg bid last time, continue current dose, he is taking coreg once day  Chest pain: most likley reproducible with sternotomy clip, she lifted 20 lbs flower had chest xray and will recommend to see CT sx  Dizziness with standing: she thougt it was from farxiga,however, it could be orthstatic related  Palpitations:stable after SVT ablation, continue coreg  CAD: s/p CABG koenig TO LAD, patient had dissection of LAD WITH angioplasty attempt, Continue aspirin,betablockers, statins, stress test was  arthritis/joint pain/back pain

## 2024-05-14 ENCOUNTER — OUTPATIENT (OUTPATIENT)
Dept: OUTPATIENT SERVICES | Facility: HOSPITAL | Age: 74
LOS: 1 days | Discharge: HOME | End: 2024-05-14

## 2024-05-14 DIAGNOSIS — C34.91 MALIGNANT NEOPLASM OF UNSPECIFIED PART OF RIGHT BRONCHUS OR LUNG: ICD-10-CM

## 2024-05-14 DIAGNOSIS — R91.1 SOLITARY PULMONARY NODULE: Chronic | ICD-10-CM

## 2024-05-14 DIAGNOSIS — Z96.649 PRESENCE OF UNSPECIFIED ARTIFICIAL HIP JOINT: Chronic | ICD-10-CM

## 2024-05-14 DIAGNOSIS — Z90.89 ACQUIRED ABSENCE OF OTHER ORGANS: Chronic | ICD-10-CM

## 2024-05-14 DIAGNOSIS — Z98.890 OTHER SPECIFIED POSTPROCEDURAL STATES: Chronic | ICD-10-CM

## 2024-05-19 PROBLEM — C34.91 SQUAMOUS CELL CARCINOMA OF RIGHT LUNG: Status: ACTIVE | Noted: 2023-09-15

## 2024-05-19 NOTE — PHYSICAL EXAM
[Fully active, able to carry on all pre-disease performance without restriction] : Status 0 - Fully active, able to carry on all pre-disease performance without restriction [Normal] : affect appropriate [de-identified] : no dominant mass

## 2024-05-21 ENCOUNTER — APPOINTMENT (OUTPATIENT)
Dept: HEMATOLOGY ONCOLOGY | Facility: CLINIC | Age: 74
End: 2024-05-21
Payer: MEDICARE

## 2024-05-21 VITALS
OXYGEN SATURATION: 93 % | SYSTOLIC BLOOD PRESSURE: 114 MMHG | BODY MASS INDEX: 44.56 KG/M2 | TEMPERATURE: 97.5 F | RESPIRATION RATE: 16 BRPM | DIASTOLIC BLOOD PRESSURE: 76 MMHG | WEIGHT: 243.61 LBS | HEART RATE: 96 BPM

## 2024-05-21 DIAGNOSIS — C34.91 MALIGNANT NEOPLASM OF UNSPECIFIED PART OF RIGHT BRONCHUS OR LUNG: ICD-10-CM

## 2024-05-21 PROCEDURE — G2211 COMPLEX E/M VISIT ADD ON: CPT

## 2024-05-21 PROCEDURE — 99213 OFFICE O/P EST LOW 20 MIN: CPT

## 2024-05-21 NOTE — ASSESSMENT
[FreeTextEntry1] : CT chest report, 5/8/24 and 5/13/24 thoracic surgery notes reviewed. #1) Stage IAI (T1aN0) RUL SCCA Lung. 9/7/2023-Status post right VATS/RUL, wedge resection, MLND -- h/o elevated alkaline phosphatase noted-?secondary to cholesterol medication 11/7/2023 PET/CT scan- 1. Postsurgical changes in the right upper lobe with nonspecific activity. Nonavid relatively stable bilateral lower lobe subcentimeter nodules and a new left upper lobe subcentimeter nodule below PET detection; reassess on follow-up. 2. No suspicious FDG avid malignant lesion in the remaining field of view. --with Stage IAl disease, as per NCCN guidelines, observation was recommended. 12/14/2023-CT chest-Unchanged right lung wedge resection and small nodules in both lungs. 5/7/2024-CT chest-Right lung wedge resection with stable postoperative changes. Similar-appearing 2 cm soft tissue opacity at the periphery of the right base compared to 12/14/2023, but new compared to 7/24/2023; this opacity is indeterminate, but favored to represent focal atelectasis or scarring. CT chest follow-up in 3 months recommended to assess stability. Multiple bilateral pulmonary nodules measuring up to 0.6 cm are unchanged compared to 12/14/2023. Recommend attention on follow-up examination.  --clinically stable at present ---next CT chest 3 months as suggested by radiology  #2) hepatic steatosis on CT d/w patient-advised f/u with PCP/GI for this. Patient's questions answered regarding GS symptoms to watch for as well.  Patient was given the opportunity to ask questions.  Her questions have been answered to her apparent satisfaction at this time.  She expressed her understanding and willingness to comply with recommended follow-up.  -->RTO 6 months, or earlier as clinically indicated.

## 2024-05-22 ENCOUNTER — RX RENEWAL (OUTPATIENT)
Age: 74
End: 2024-05-22

## 2024-05-23 ENCOUNTER — RX RENEWAL (OUTPATIENT)
Age: 74
End: 2024-05-23

## 2024-05-23 RX ORDER — ROSUVASTATIN CALCIUM 20 MG/1
20 TABLET, FILM COATED ORAL
Qty: 90 | Refills: 1 | Status: ACTIVE | COMMUNITY
Start: 2020-09-30 | End: 1900-01-01

## 2024-06-13 ENCOUNTER — APPOINTMENT (OUTPATIENT)
Dept: FAMILY MEDICINE | Facility: CLINIC | Age: 74
End: 2024-06-13
Payer: MEDICARE

## 2024-06-13 VITALS
HEART RATE: 86 BPM | BODY MASS INDEX: 44.99 KG/M2 | TEMPERATURE: 98 F | WEIGHT: 246 LBS | RESPIRATION RATE: 16 BRPM | SYSTOLIC BLOOD PRESSURE: 120 MMHG | DIASTOLIC BLOOD PRESSURE: 76 MMHG | OXYGEN SATURATION: 95 %

## 2024-06-13 DIAGNOSIS — Z76.89 PERSONS ENCOUNTERING HEALTH SERVICES IN OTHER SPECIFIED CIRCUMSTANCES: ICD-10-CM

## 2024-06-13 DIAGNOSIS — I25.10 ATHEROSCLEROTIC HEART DISEASE OF NATIVE CORONARY ARTERY W/OUT ANGINA PECTORIS: ICD-10-CM

## 2024-06-13 DIAGNOSIS — R05.8 OTHER SPECIFIED COUGH: ICD-10-CM

## 2024-06-13 DIAGNOSIS — E78.5 HYPERLIPIDEMIA, UNSPECIFIED: ICD-10-CM

## 2024-06-13 DIAGNOSIS — F31.9 BIPOLAR DISORDER, UNSPECIFIED: ICD-10-CM

## 2024-06-13 PROCEDURE — 99214 OFFICE O/P EST MOD 30 MIN: CPT

## 2024-06-13 PROCEDURE — G2211 COMPLEX E/M VISIT ADD ON: CPT

## 2024-06-13 RX ORDER — GABAPENTIN 100 MG/1
100 CAPSULE ORAL
Qty: 21 | Refills: 0 | Status: DISCONTINUED | COMMUNITY
Start: 2023-09-10 | End: 2024-06-13

## 2024-06-13 RX ORDER — HYDROMORPHONE HYDROCHLORIDE 2 MG/1
2 TABLET ORAL
Qty: 30 | Refills: 0 | Status: DISCONTINUED | COMMUNITY
Start: 2023-09-10 | End: 2024-06-13

## 2024-06-13 NOTE — PHYSICAL EXAM
[No Respiratory Distress] : no respiratory distress  [Coordination Grossly Intact] : coordination grossly intact [No Focal Deficits] : no focal deficits [Normal] : affect was normal and insight and judgment were intact [de-identified] : morbidly obese [de-identified] : wearing glasses

## 2024-06-13 NOTE — HISTORY OF PRESENT ILLNESS
[FreeTextEntry1] : Establish care with new PCP [de-identified] : 73 year old female, former smoker (1 ppd since age 21, quit 2019), current user of vaping products, hx of ETOH abuse, w/ hx of CAD s/p 2 stents (2019, then 08/2020), MI (08/2020), HLD, HTN, BCC of the forehead, Bipolar Disorder who presents to establish care with new pcp. The patient has seen a number of different specialists for her conditions.   Past Medical History (PMH):   - Patient has a history of heart attacks, stents, hip replacement and cancer (lung and skin). The patient has also been diagnosed with high blood pressure and anxiety. She regularly sees a psychiatrist, Dr. Staples, and a number of other specialists including Dr. Hillman, Dr. Bonilla and Dr. Beach.  Social History (SH):   - Patient lives alone, her two children live in different states. She has to manage her medical appointments and logistics by herself. She does not drive long distances, so she often relies on friends and neighbors for rides to her medical appointments.  Medications:   - Patient is on various medications managed by different specialists. These include Olanzapine and Lamotrigine for anxiety, prescribed by her psychiatrist, Dr. Staples; medications for high blood pressure; low dose aspirin for heart conditions; Rosuvastatin for cholesterol; Carvedilol for heart disease; Losartan for high blood pressure; Lamotrigine as mood stabilizers; Naltrexone for appetite, and Trazodone for insomnia.  Review of Systems (ROS):   - Patient reports an increase in weight. She also mentions having difficulty in sleeping, even while on Trazodone.  Laboratory/Data Review:   - Most recent CT scan of chest shows no evidence of new or recurrent disease. Other screenings like mammogram and Cologuard have come out negative. Blood work conducted also does not indicate diabetes, and cholesterol levels are under control.  Patient is s/p back melanoma excision on 02/05/2024.  Healthcare Maintenance  Mammo - 6/2023 Colon ca screening - cologuard 2023 LDCT - 05/07/2024  Specialists: CT surgery - Dr. Hillman  Heme-onc- Dr. Marnie Aviles Psych- Dr. Staples  Derm - Dr. Bonilla Cards- Dr. Beach

## 2024-07-03 ENCOUNTER — RX RENEWAL (OUTPATIENT)
Age: 74
End: 2024-07-03

## 2024-07-19 NOTE — ED ADULT NURSE NOTE - BREATHING, MLM
Progress Note - Behavioral Health   Alberto Berumen 28 y.o. male MRN: [id number]  Unit/Bed#: [facility] 108-02 Encounter: [id number]      Subjective:     Documentation, nursing notes, medication reconciliation, labs, and vitals reviewed. Patient was seen for continuing care and reviewed with treatment team.  No acute events over the past 24 hours. Per nursing report, Alberto received ECT yesterday.  He continues to have chronic auditory hallucinations of voices telling him they are going to kill him and the family.  He has been visible and social on the unit.  Alberto has been touchy with Ana and needs redirection. PRN medications administered: Lidocaine viscus mucosal solution at 9:07 AM.  No medication changes over the past 24 hours.     On evaluation today, Alberto presents walking in the hallways with his restrepo up and then in his room.  Appears guarded and suspicious upon approach.  He reports sleeping well last evening.  He denies any side effects from the medications at this time.  His appetite remains adequate.  He denies any anxiety and reports 8/10 depression on the severity scale with 10 being the most severe.  He denies any SI/HI and is without plan or intent.  He denies visual hallucinations, however endorses auditory hallucinations of voices telling him they are going to kill him.  Alberto denies any pain.  Alberto states ECT went well yesterday.    Continues to tolerate current medications with no adverse effects.     Denies depression and does not appear anxious.   No self-harming/suicidal ideation, plan, or intent upon direct inquiry. No thoughts to harm others.  No agitation or aggression noted. Does not appear overtly manic. Offers no further complaints.       Psychiatric ROS:  Behavior over the last 24 hours: unchanged  Sleep: normal  Appetite: normal  Medication side effects: No   ROS: no complaints, all other systems are negative      Mental Status Evaluation:    Appearance:  age appropriate,  casually dressed, dressed appropriately   Behavior:  cooperative, calm, guarded   Speech:  normal rate, scant, soft   Mood:  depressed   Affect:  flat   Thought Process:  coherent   Associations: intact associations   Thought Content:  no overt delusions   Perceptual Disturbances: auditory hallucinations of voices telling him they will kill him   Risk Potential: Suicidal ideation - None  Homicidal ideation - None  Potential for aggression - No   Sensorium:  oriented to person and place   Memory:  recent and remote memory grossly intact   Consciousness:  alert and awake   Attention/Concentration: attention span and concentration appear shorter than expected for age   Insight:  poor   Judgment: poor   Gait/Station: normal gait/station   Motor Activity: no abnormal movements       Vital signs in last 24 hours:    Temp:  [97.5 °F (36.4 °C)-98.1 °F (36.7 °C)] 97.8 °F (36.6 °C)  HR:  [] 93  Resp:  [8-20] 18  BP: (116-144)/(60-86) 144/86    Laboratory results: I have personally reviewed all pertinent laboratory/tests results    Results from the past 24 hours:   Recent Results (from the past 24 hour(s))   B-Type Natriuretic Peptide(BNP)    Collection Time: 07/18/24  9:13 PM   Result Value Ref Range    BNP 17 0 - 100 pg/mL   C-reactive protein    Collection Time: 07/18/24  9:13 PM   Result Value Ref Range    CRP 10.7 (H) <3.0 mg/L   CK    Collection Time: 07/18/24  9:13 PM   Result Value Ref Range    Total  (H) 39 - 308 U/L   CBC and differential    Collection Time: 07/18/24  9:13 PM   Result Value Ref Range    WBC 12.00 (H) 4.31 - 10.16 Thousand/uL    RBC 5.52 3.88 - 5.62 Million/uL    Hemoglobin 13.0 12.0 - 17.0 g/dL    Hematocrit 43.7 36.5 - 49.3 %    MCV 79 (L) 82 - 98 fL    MCH 23.6 (L) 26.8 - 34.3 pg    MCHC 29.7 (L) 31.4 - 37.4 g/dL    RDW 13.7 11.6 - 15.1 %    MPV 11.2 8.9 - 12.7 fL    Platelets 265 149 - 390 Thousands/uL    nRBC 0 /100 WBCs    Segmented % 54 43 - 75 %    Immature Grans % 0 0 - 2 %     Lymphocytes % 34 14 - 44 %    Monocytes % 8 4 - 12 %    Eosinophils Relative 3 0 - 6 %    Basophils Relative 1 0 - 1 %    Absolute Neutrophils 6.46 1.85 - 7.62 Thousands/µL    Absolute Immature Grans 0.05 0.00 - 0.20 Thousand/uL    Absolute Lymphocytes 4.12 0.60 - 4.47 Thousands/µL    Absolute Monocytes 0.95 0.17 - 1.22 Thousand/µL    Eosinophils Absolute 0.36 0.00 - 0.61 Thousand/µL    Basophils Absolute 0.06 0.00 - 0.10 Thousands/µL   Clozapine-[facility]    Collection Time: 07/18/24  9:13 PM   Result Value Ref Range    Clozapine Lvl 451 350 - 900 ng/mL         Progress Toward Goals: progressing    Suicide/Homicide Risk Assessment:    Risk of Harm to Self:   Nursing Suicide Risk Assessment Last 24 hours: C-SSRS Risk (Since Last Contact)  Calculated C-SSRS Risk Score (Since Last Contact): No Risk Indicated    Risk of Harm to Others:  Nursing Homicide Risk Assessment: Violence Risk to Others: Denies within past 6 months    Assessment & Plan   Principal Problem:    Schizoaffective disorder, bipolar type (HCC)  Active Problems:    GERD (gastroesophageal reflux disease)    Medical clearance for psychiatric admission    Tobacco abuse    T wave inversion in EKG    Chronic idiopathic constipation    Confluent and reticulate papillomatosis    Primary hypertension    Elevated hemoglobin A1c    Bilateral lower extremity edema      Recommended Treatment:     Planned medication and treatment changes:    All current active medications have been reviewed  Encourage group therapy, milieu therapy and occupational therapy  Behavioral Health checks every 7 minutes  Continue with SLIM medical management as indicated  Disposition planning ongoing      Continue current medications:    Current Facility-Administered Medications   Medication Dose Route Frequency Provider Last Rate    acetaminophen  650 mg Oral Q4H PRN Jordan C Holter, DO      acetaminophen  650 mg Oral Q6H PRN HOLLI Lion      aluminum-magnesium hydroxide-simethicone   30 mL Oral Q4H PRN Jordan C Holter, DO      amLODIPine  5 mg Oral Daily Eveline Hunt CRNP      ARIPiprazole  15 mg Oral Daily Bora Rosario MD      Artificial Tears  1 drop Both Eyes Q3H PRN Jordan C Holter, DO      atropine  1 drop Sublingual Daily PRN Eveline Hunt, STEVENP      haloperidol lactate  2.5 mg Intramuscular Q4H PRN Max 4/day Eveline Hunt, CRNP      And    LORazepam  1 mg Intramuscular Q4H PRN Max 4/day Eveline Hunt CRNP      And    benztropine  0.5 mg Intramuscular Q4H PRN Max 4/day Evelinevipul Hunt, CRNP      benztropine  1 mg Intramuscular Q4H PRN Max 6/day Jordan C Holter, DO      haloperidol lactate  5 mg Intramuscular Q4H PRN Max 4/day STEVE LionNP      And    LORazepam  2 mg Intramuscular Q4H PRN Max 4/day STEVE LionNP      And    benztropine  1 mg Intramuscular Q4H PRN Max 4/day HOLLI Lion      benztropine  1 mg Oral Q4H PRN Max 6/day HOLLI Lion      benztropine  1 mg Oral Q4H PRN Max 6/day Jordan C Holter, DO      bisacodyl  10 mg Rectal Daily PRN HOLLI Lion      calcium carbonate  500 mg Oral BID PRN HOLLI Monge      cloZAPine  500 mg Oral HS Bora Rosario MD      cyanocobalamin  1,000 mcg Oral Daily Pia Mantilla, HOLLI      hydrOXYzine HCL  50 mg Oral Q6H PRN Max 4/day Jordan C Holter, DO      Or    diphenhydrAMINE  50 mg Intramuscular Q6H PRN Jordan C Holter, DO      hydrOXYzine HCL  50 mg Oral Q6H PRN Max 4/day HOLLI Lion      Or    diphenhydrAMINE  50 mg Intramuscular Q6H PRN HOLLI Lion      diphenhydrAMINE-zinc acetate   Topical BID PRN HOLLI Lion      escitalopram  20 mg Oral Daily HOLLI Lion      famotidine  20 mg Oral BID HOLLI Monge      fluticasone  1 spray Each Nare Daily Brina Guillen MD      glycopyrrolate  1 mg Oral TID Bora Rosario MD      haloperidol  1 mg Oral Q6H PRN HOLLI Lion      haloperidol  2.5 mg Oral Q4H PRN Max 4/day HOLLI Lion      haloperidol  5 mg  Oral Q4H PRN Max 4/day HOLLI Lion      hydroCHLOROthiazide  12.5 mg Oral Daily HOLLI Galvan      hydrocortisone   Topical 4x Daily PRN HOLLI Lion      hydrOXYzine HCL  100 mg Oral Q6H PRN Max 4/day [facility] Holter, DO      Or    LORazepam  2 mg Intramuscular Q6H PRN [facility] Holter, DO      hydrOXYzine HCL  100 mg Oral Q6H PRN Max 4/day HOLLI Lion      Or    LORazepam  2 mg Intramuscular Q6H PRN HOLLI Lion      hydrOXYzine HCL  25 mg Oral Q6H PRN Max 4/day [facility] Holter, DO      ibuprofen  600 mg Oral Q8H PRN HOLLI Lion      Lidocaine Viscous HCl  15 mL Swish & Spit 4x Daily PRN Bora Rosario MD      loratadine  10 mg Oral Daily Brina Guillen MD      melatonin  3 mg Oral HS [facility] Holter, DO      metFORMIN  500 mg Oral BID With Meals HOLLI Galvan      methocarbamol  500 mg Oral Q6H PRN HOLLI Lion      nicotine polacrilex  2 mg Oral Q4H PRN Bora Rosario MD      OLANZapine  5 mg Oral Q4H PRN Max 3/day [facility] Holter, DO      Or    OLANZapine  2.5 mg Intramuscular Q4H PRN Max 3/day [facility] Holter, DO      OLANZapine  5 mg Oral Q3H PRN Max 3/day [facility] Holter, DO      Or    OLANZapine  5 mg Intramuscular Q3H PRN Max 3/day [facility] Holter, DO      OLANZapine  2.5 mg Oral Q4H PRN Max 6/day [facility] Holter, DO      ondansetron  4 mg Oral Q6H PRN HOLLI Lion      pantoprazole  20 mg Oral Early Morning HOLLI Mnoge      polyethylene glycol  17 g Oral Daily HOLLI Lion      polyethylene glycol  17 g Oral Daily PRN [facility] Holter, DO      propranolol  10 mg Oral Q12H KAYLIE HOLLI Lion      senna-docusate sodium  1 tablet Oral Daily PRN [facility] Holter, DO      senna-docusate sodium  2 tablet Oral BID HOLLI Lion      traZODone  50 mg Oral HS PRN HOLLI Lion      white petrolatum-mineral oil   Topical TID PRN HOLLI Lion           Risks / Benefits of Treatment:    Risks, benefits, and  possible side effects of medications explained to patient and patient verbalizes understanding and agreement for treatment.    Counseling / Coordination of Care:    Patient's progress discussed with staff in treatment team meeting.  Medications, treatment progress and treatment plan reviewed with patient.    Note Share    This note was not shared with the patient due to reasonable likelihood of causing patient harm    HOLLI Stoddard 07/19/24             Spontaneous, unlabored and symmetrical

## 2024-08-01 ENCOUNTER — APPOINTMENT (OUTPATIENT)
Dept: CT IMAGING | Facility: HOSPITAL | Age: 74
End: 2024-08-01

## 2024-08-01 ENCOUNTER — OUTPATIENT (OUTPATIENT)
Dept: OUTPATIENT SERVICES | Facility: HOSPITAL | Age: 74
LOS: 1 days | End: 2024-08-01
Payer: MEDICARE

## 2024-08-01 DIAGNOSIS — C34.91 MALIGNANT NEOPLASM OF UNSPECIFIED PART OF RIGHT BRONCHUS OR LUNG: ICD-10-CM

## 2024-08-01 DIAGNOSIS — Z90.89 ACQUIRED ABSENCE OF OTHER ORGANS: Chronic | ICD-10-CM

## 2024-08-01 DIAGNOSIS — Z98.890 OTHER SPECIFIED POSTPROCEDURAL STATES: Chronic | ICD-10-CM

## 2024-08-01 DIAGNOSIS — R91.1 SOLITARY PULMONARY NODULE: Chronic | ICD-10-CM

## 2024-08-01 DIAGNOSIS — Z96.649 PRESENCE OF UNSPECIFIED ARTIFICIAL HIP JOINT: Chronic | ICD-10-CM

## 2024-08-01 PROCEDURE — 71250 CT THORAX DX C-: CPT | Mod: 26,MH

## 2024-08-01 PROCEDURE — 71250 CT THORAX DX C-: CPT

## 2024-09-03 ENCOUNTER — RX RENEWAL (OUTPATIENT)
Age: 74
End: 2024-09-03

## 2024-10-09 ENCOUNTER — APPOINTMENT (OUTPATIENT)
Dept: CT IMAGING | Facility: HOSPITAL | Age: 74
End: 2024-10-09
Payer: MEDICARE

## 2024-10-09 ENCOUNTER — OUTPATIENT (OUTPATIENT)
Dept: OUTPATIENT SERVICES | Facility: HOSPITAL | Age: 74
LOS: 1 days | End: 2024-10-09
Payer: MEDICARE

## 2024-10-09 DIAGNOSIS — Z98.890 OTHER SPECIFIED POSTPROCEDURAL STATES: Chronic | ICD-10-CM

## 2024-10-09 DIAGNOSIS — C34.91 MALIGNANT NEOPLASM OF UNSPECIFIED PART OF RIGHT BRONCHUS OR LUNG: ICD-10-CM

## 2024-10-09 DIAGNOSIS — R91.1 SOLITARY PULMONARY NODULE: Chronic | ICD-10-CM

## 2024-10-09 PROCEDURE — 71250 CT THORAX DX C-: CPT | Mod: 26,MH

## 2024-11-13 ENCOUNTER — APPOINTMENT (OUTPATIENT)
Dept: THORACIC SURGERY | Facility: CLINIC | Age: 74
End: 2024-11-13
Payer: MEDICARE

## 2024-11-13 VITALS — SYSTOLIC BLOOD PRESSURE: 98 MMHG | DIASTOLIC BLOOD PRESSURE: 59 MMHG | OXYGEN SATURATION: 94 %

## 2024-11-13 VITALS — BODY MASS INDEX: 40.48 KG/M2 | HEART RATE: 88 BPM | OXYGEN SATURATION: 90 % | WEIGHT: 220 LBS | HEIGHT: 62 IN

## 2024-11-13 PROCEDURE — 99213 OFFICE O/P EST LOW 20 MIN: CPT

## 2024-11-13 PROCEDURE — G2211 COMPLEX E/M VISIT ADD ON: CPT

## 2024-11-14 ENCOUNTER — OUTPATIENT (OUTPATIENT)
Dept: OUTPATIENT SERVICES | Facility: HOSPITAL | Age: 74
LOS: 1 days | Discharge: HOME | End: 2024-11-14

## 2024-11-14 DIAGNOSIS — Z96.649 PRESENCE OF UNSPECIFIED ARTIFICIAL HIP JOINT: Chronic | ICD-10-CM

## 2024-11-14 DIAGNOSIS — C34.91 MALIGNANT NEOPLASM OF UNSPECIFIED PART OF RIGHT BRONCHUS OR LUNG: ICD-10-CM

## 2024-11-14 DIAGNOSIS — R91.1 SOLITARY PULMONARY NODULE: Chronic | ICD-10-CM

## 2024-11-14 DIAGNOSIS — Z98.890 OTHER SPECIFIED POSTPROCEDURAL STATES: Chronic | ICD-10-CM

## 2024-11-14 DIAGNOSIS — Z90.89 ACQUIRED ABSENCE OF OTHER ORGANS: Chronic | ICD-10-CM

## 2024-11-19 ENCOUNTER — APPOINTMENT (OUTPATIENT)
Dept: HEMATOLOGY ONCOLOGY | Facility: CLINIC | Age: 74
End: 2024-11-19
Payer: MEDICARE

## 2024-11-19 VITALS
WEIGHT: 239.86 LBS | OXYGEN SATURATION: 95 % | DIASTOLIC BLOOD PRESSURE: 69 MMHG | HEART RATE: 80 BPM | RESPIRATION RATE: 16 BRPM | BODY MASS INDEX: 43.87 KG/M2 | SYSTOLIC BLOOD PRESSURE: 106 MMHG | TEMPERATURE: 97 F

## 2024-11-19 DIAGNOSIS — C34.91 MALIGNANT NEOPLASM OF UNSPECIFIED PART OF RIGHT BRONCHUS OR LUNG: ICD-10-CM

## 2024-11-19 PROCEDURE — 99213 OFFICE O/P EST LOW 20 MIN: CPT

## 2024-11-19 PROCEDURE — G2211 COMPLEX E/M VISIT ADD ON: CPT

## 2024-11-20 PROCEDURE — 71250 CT THORAX DX C-: CPT

## 2025-01-25 ENCOUNTER — RX RENEWAL (OUTPATIENT)
Age: 75
End: 2025-01-25

## 2025-05-15 ENCOUNTER — OUTPATIENT (OUTPATIENT)
Dept: OUTPATIENT SERVICES | Facility: HOSPITAL | Age: 75
LOS: 1 days | Discharge: HOME | End: 2025-05-15

## 2025-05-15 DIAGNOSIS — R91.1 SOLITARY PULMONARY NODULE: Chronic | ICD-10-CM

## 2025-05-15 DIAGNOSIS — Z98.890 OTHER SPECIFIED POSTPROCEDURAL STATES: Chronic | ICD-10-CM

## 2025-05-15 DIAGNOSIS — Z96.649 PRESENCE OF UNSPECIFIED ARTIFICIAL HIP JOINT: Chronic | ICD-10-CM

## 2025-05-15 DIAGNOSIS — C34.91 MALIGNANT NEOPLASM OF UNSPECIFIED PART OF RIGHT BRONCHUS OR LUNG: ICD-10-CM

## 2025-05-15 DIAGNOSIS — Z90.89 ACQUIRED ABSENCE OF OTHER ORGANS: Chronic | ICD-10-CM

## 2025-05-20 ENCOUNTER — APPOINTMENT (OUTPATIENT)
Dept: HEMATOLOGY ONCOLOGY | Facility: CLINIC | Age: 75
End: 2025-05-20

## 2025-05-20 DIAGNOSIS — C34.91 MALIGNANT NEOPLASM OF UNSPECIFIED PART OF RIGHT BRONCHUS OR LUNG: ICD-10-CM
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
## 2025-06-05 NOTE — OCCUPATIONAL THERAPY INITIAL EVALUATION ADULT - REHAB POTENTIAL, OT EVAL
June 5, 2025     Patient: Linnette Groves   YOB: 2016   Date of Visit: 6/5/2025       To Whom it May Concern:    Linnette Groves was seen in my clinic on 6/5/2025 at 10:00 am.     Please excuse Linnette for her absence from school on the date listed above to be able to make her appointment.    Sincerely,         Delmi Srinivasan MD    Medical information is confidential and cannot be disclosed without the written consent of the patient or her representative.     good, to achieve stated therapy goals

## 2025-07-23 ENCOUNTER — APPOINTMENT (OUTPATIENT)
Dept: DERMATOLOGY | Facility: CLINIC | Age: 75
End: 2025-07-23
Payer: MEDICARE

## 2025-07-23 DIAGNOSIS — Z85.828 PERSONAL HISTORY OF OTHER MALIGNANT NEOPLASM OF SKIN: ICD-10-CM

## 2025-07-23 DIAGNOSIS — Z86.006 PERSONAL HISTORY OF MELANOMA IN-SITU: ICD-10-CM

## 2025-07-23 DIAGNOSIS — L30.8 OTHER SPECIFIED DERMATITIS: ICD-10-CM

## 2025-07-23 DIAGNOSIS — L57.0 ACTINIC KERATOSIS: ICD-10-CM

## 2025-07-23 PROCEDURE — 99214 OFFICE O/P EST MOD 30 MIN: CPT

## 2025-07-23 PROCEDURE — 99204 OFFICE O/P NEW MOD 45 MIN: CPT

## 2025-07-23 RX ORDER — FLUOROURACIL 50 MG/G
5 CREAM TOPICAL
Qty: 1 | Refills: 1 | Status: ACTIVE | COMMUNITY
Start: 2025-07-23 | End: 1900-01-01

## 2025-08-04 ENCOUNTER — RX RENEWAL (OUTPATIENT)
Age: 75
End: 2025-08-04

## 2025-09-04 ENCOUNTER — APPOINTMENT (OUTPATIENT)
Dept: DERMATOLOGY | Facility: CLINIC | Age: 75
End: 2025-09-04
Payer: MEDICARE

## 2025-09-04 DIAGNOSIS — L57.0 ACTINIC KERATOSIS: ICD-10-CM

## 2025-09-04 PROCEDURE — 99213 OFFICE O/P EST LOW 20 MIN: CPT

## (undated) DEVICE — SOL ANTI FOG

## (undated) DEVICE — SUT POLYSORB 3-0 30" V-20 UNDYED

## (undated) DEVICE — ELCTR BOVIE TIP BLADE INSULATED 2.75" EDGE

## (undated) DEVICE — SUT MONOCRYL 4-0 27" PS-2 UNDYED

## (undated) DEVICE — PREP CHLORAPREP HI-LITE ORANGE 26ML

## (undated) DEVICE — SUT POLYSORB 4-0 30" C-13 UNDYED

## (undated) DEVICE — SUT PROLENE 0 30" CT-1

## (undated) DEVICE — ELCTR BOVIE PENCIL SMOKE EVACUATION

## (undated) DEVICE — DRSG STERISTRIPS 0.5 X 4"

## (undated) DEVICE — VENODYNE/SCD SLEEVE CALF MEDIUM

## (undated) DEVICE — POSITIONER STRAP ARMBOARD VELCRO TS-30

## (undated) DEVICE — GLV 7.5 PROTEXIS (WHITE)

## (undated) DEVICE — SUT VICRYL 3-0 27" SH UNDYED

## (undated) DEVICE — ENDOCATCH GENERAL 10MM (PURPLE)

## (undated) DEVICE — SUT VICRYL 2-0 27" UR-6

## (undated) DEVICE — WARMING BLANKET FULL ADULT

## (undated) DEVICE — SOLIDIFIER CANN EXPRESS 3K

## (undated) DEVICE — DRAPE IOBAN 33" X 23"

## (undated) DEVICE — TUBING SUCTION NONCONDUCTIVE 6MM X 12FT

## (undated) DEVICE — ENDOCATCH II 15MM

## (undated) DEVICE — GLV 7 PROTEXIS (WHITE)

## (undated) DEVICE — PACK MINOR

## (undated) DEVICE — ELCTR EXTENSION STRAIGHT

## (undated) DEVICE — GLV COTTON DEROYAL XL

## (undated) DEVICE — SOL IRR POUR NS 0.9% 500ML

## (undated) DEVICE — WARMING BLANKET LOWER ADULT

## (undated) DEVICE — SPECIMEN CONTAINER PET

## (undated) DEVICE — DISSECTOR ENDOSCOPIC KITTNER SINGLE TIP

## (undated) DEVICE — GLV 8.5 PROTEXIS (WHITE)

## (undated) DEVICE — HAND-AID ARTERIAL WRIST SUPPORT

## (undated) DEVICE — DISSECTOR ENDO PEANUT 5MM

## (undated) DEVICE — SUT VICRYL 0 27" CT-1 UNDYED

## (undated) DEVICE — STAPLER SKIN VISI-STAT 35 WIDE

## (undated) DEVICE — VISITEC 4X4

## (undated) DEVICE — DRAPE LARGE SHEET 72X85"

## (undated) DEVICE — DRSG TELFA 3 X 8

## (undated) DEVICE — POSITIONER FOAM HEAD CRADLE (PINK)

## (undated) DEVICE — Device

## (undated) DEVICE — SOL IRR POUR H2O 1500ML

## (undated) DEVICE — STAPLER ECHELON FLEX POWERED PLUS 340MM

## (undated) DEVICE — DRAPE MAGNETIC INSTRUMENT MEDIUM

## (undated) DEVICE — CHEST DRAIN OASIS DRY SUCTION WATER SEAL

## (undated) DEVICE — SUT VICRYL 1 27" CTX

## (undated) DEVICE — SUT SILK 0 18" TIES

## (undated) DEVICE — STAPLER COVIDIEN ENDO GIA STANDARD HANDLE

## (undated) DEVICE — CANISTER SUCTION LID GUARD 3000CC

## (undated) DEVICE — DRSG MASTISOL

## (undated) DEVICE — DRSG TEGADERM 4X4.75"

## (undated) DEVICE — PACK MAJOR ABDOMINAL W ENDO DRAPE

## (undated) DEVICE — FOLEY TRAY 16FR 5CC LF UMETER CLOSED

## (undated) DEVICE — SUT SILK 2-0 24" TIES

## (undated) DEVICE — SUT SILK 2-0 30" TIES

## (undated) DEVICE — TAPE SILK 3"

## (undated) DEVICE — SYR SLIP 10CC

## (undated) DEVICE — CONNECTOR REDUCING STRAIGHT 3/8X0.25"

## (undated) DEVICE — ELCTR BOVIE TIP BLADE INSULATED 6.5" EDGE

## (undated) DEVICE — SUT POLYSORB 2-0 30" V-20 UNDYED

## (undated) DEVICE — ELCTR GROUNDING PAD ADULT COVIDIEN

## (undated) DEVICE — ADAPTER FIBEROPTIC BRONCHOSCOPE DUAL AXIS SWIVEL

## (undated) DEVICE — DRSG BENZOIN 0.6CC

## (undated) DEVICE — ENDOCATCH 10MM SPECIMEN POUCH